# Patient Record
Sex: FEMALE | Race: WHITE
[De-identification: names, ages, dates, MRNs, and addresses within clinical notes are randomized per-mention and may not be internally consistent; named-entity substitution may affect disease eponyms.]

---

## 2018-07-13 ENCOUNTER — HOSPITAL ENCOUNTER (OUTPATIENT)
Dept: HOSPITAL 76 - LAB | Age: 33
Discharge: HOME | End: 2018-07-13
Attending: ADVANCED PRACTICE MIDWIFE
Payer: COMMERCIAL

## 2018-07-13 DIAGNOSIS — E28.2: ICD-10-CM

## 2018-07-13 DIAGNOSIS — Z01.419: Primary | ICD-10-CM

## 2018-07-13 LAB
CHOLEST SERPL-MCNC: 238 MG/DL
EST. AVERAGE GLUCOSE BLD GHB EST-MCNC: 123 MG/DL (ref 70–100)
FSH SERPL-ACNC: 3.85 MIU/ML
HB2 TOTAL: 17.8 G/DL
HBA1C BLD-MCNC: 0.72 G/DL
HDLC SERPL-MCNC: 30 MG/DL
HDLC SERPL: 7.9 {RATIO} (ref ?–4.4)
HEMOGLOBIN A1C %: 5.9 % (ref 4.6–6.2)
LDLC SERPL CALC-MCNC: 135 MG/DL
LDLC/HDLC SERPL: 4.5 {RATIO} (ref ?–4.4)
LH SERPL-ACNC: 7.2 MIU/ML
TSH SERPL-ACNC: 2.22 UIU/ML (ref 0.34–5.6)
VLDLC SERPL-SCNC: 73 MG/DL

## 2018-07-13 PROCEDURE — 81599 UNLISTED MAAA: CPT

## 2018-07-13 PROCEDURE — 83721 ASSAY OF BLOOD LIPOPROTEIN: CPT

## 2018-07-13 PROCEDURE — 36415 COLL VENOUS BLD VENIPUNCTURE: CPT

## 2018-07-13 PROCEDURE — 80061 LIPID PANEL: CPT

## 2018-07-13 PROCEDURE — 83002 ASSAY OF GONADOTROPIN (LH): CPT

## 2018-07-13 PROCEDURE — 82670 ASSAY OF TOTAL ESTRADIOL: CPT

## 2018-07-13 PROCEDURE — 82626 DEHYDROEPIANDROSTERONE: CPT

## 2018-07-13 PROCEDURE — 83036 HEMOGLOBIN GLYCOSYLATED A1C: CPT

## 2018-07-13 PROCEDURE — 84443 ASSAY THYROID STIM HORMONE: CPT

## 2018-07-13 PROCEDURE — 82951 GLUCOSE TOLERANCE TEST (GTT): CPT

## 2018-07-13 PROCEDURE — 83001 ASSAY OF GONADOTROPIN (FSH): CPT

## 2018-07-14 LAB — ESTRADIOL SERPL-MCNC: 82 PG/ML

## 2018-09-21 ENCOUNTER — HOSPITAL ENCOUNTER (OUTPATIENT)
Dept: HOSPITAL 76 - LAB | Age: 33
Discharge: HOME | End: 2018-09-21
Attending: NURSE PRACTITIONER
Payer: COMMERCIAL

## 2018-09-21 ENCOUNTER — HOSPITAL ENCOUNTER (OUTPATIENT)
Dept: HOSPITAL 76 - NS | Age: 33
Discharge: HOME | End: 2018-09-21
Attending: ADVANCED PRACTICE MIDWIFE
Payer: COMMERCIAL

## 2018-09-21 DIAGNOSIS — Z71.3: Primary | ICD-10-CM

## 2018-09-21 DIAGNOSIS — E08.8: ICD-10-CM

## 2018-09-21 DIAGNOSIS — E25.9: Primary | ICD-10-CM

## 2018-09-21 PROCEDURE — 84403 ASSAY OF TOTAL TESTOSTERONE: CPT

## 2018-09-21 PROCEDURE — 82533 TOTAL CORTISOL: CPT

## 2018-09-21 PROCEDURE — 82627 DEHYDROEPIANDROSTERONE: CPT

## 2018-09-21 PROCEDURE — 84146 ASSAY OF PROLACTIN: CPT

## 2018-09-21 PROCEDURE — 36415 COLL VENOUS BLD VENIPUNCTURE: CPT

## 2018-09-21 PROCEDURE — 97802 MEDICAL NUTRITION INDIV IN: CPT

## 2018-10-05 ENCOUNTER — HOSPITAL ENCOUNTER (OUTPATIENT)
Dept: HOSPITAL 76 - NS | Age: 33
Discharge: HOME | End: 2018-10-05
Attending: ADVANCED PRACTICE MIDWIFE
Payer: COMMERCIAL

## 2018-10-05 DIAGNOSIS — Z71.3: Primary | ICD-10-CM

## 2018-10-05 DIAGNOSIS — E08.8: ICD-10-CM

## 2018-10-05 PROCEDURE — 97803 MED NUTRITION INDIV SUBSEQ: CPT

## 2018-11-18 ENCOUNTER — HOSPITAL ENCOUNTER (OUTPATIENT)
Dept: HOSPITAL 76 - DI | Age: 33
Discharge: HOME | End: 2018-11-18
Attending: FAMILY MEDICINE
Payer: COMMERCIAL

## 2018-11-18 DIAGNOSIS — R00.2: Primary | ICD-10-CM

## 2018-11-18 DIAGNOSIS — I49.8: ICD-10-CM

## 2018-11-18 PROCEDURE — 93306 TTE W/DOPPLER COMPLETE: CPT

## 2018-12-03 ENCOUNTER — HOSPITAL ENCOUNTER (EMERGENCY)
Dept: HOSPITAL 76 - ED | Age: 33
Discharge: HOME | End: 2018-12-03
Payer: COMMERCIAL

## 2018-12-03 VITALS — SYSTOLIC BLOOD PRESSURE: 128 MMHG | DIASTOLIC BLOOD PRESSURE: 83 MMHG

## 2018-12-03 DIAGNOSIS — L29.9: ICD-10-CM

## 2018-12-03 DIAGNOSIS — Z87.891: ICD-10-CM

## 2018-12-03 DIAGNOSIS — T36.1X5A: ICD-10-CM

## 2018-12-03 DIAGNOSIS — R20.8: ICD-10-CM

## 2018-12-03 DIAGNOSIS — R21: Primary | ICD-10-CM

## 2018-12-03 PROCEDURE — 99283 EMERGENCY DEPT VISIT LOW MDM: CPT

## 2018-12-03 NOTE — ED PHYSICIAN DOCUMENTATION
PD HPI SKIN





- Stated complaint


Stated Complaint: ALLERGIC REACTION





- Chief complaint


Chief Complaint: Allergic Rx





- History obtained from


History obtained from: Patient





- History of Present Illness


Timing - onset: Yesterday


Timing - details: Gradual onset, Waxing and waning (has now noted the pattern of

rash/itching about an hour or less after taking recent abx cephalexin)


Location: [facility]


Quality / character: Itchy, Burning


Improved by: Benadryl


Associated symptoms: No: Fever, Myalgias, Joint pain


Contributing factors: Exposed to medication





Review of Systems


Constitutional: reports: Chills, Myalgias


Eyes: reports: Decreased vision


Ears: denies: Drainage/discharge


Nose: denies: Foreign Body


Throat: denies: Dental pain / toothache





PD PAST MEDICAL HISTORY





- Past Medical History


Past Medical History: No


Cardiovascular: None


Respiratory: None


Neuro: None


Endocrine/Autoimmune: HyPOthyroidism, Other


GI: None


GYN: None


: None


HEENT: None


Psych: None


Musculoskeletal: Chronic back pain


Derm: Rosacea


Other Past Medical History: late onset CAH





- Past Surgical History


Past Surgical History: No





- Present Medications


Home Medications: 


                                Ambulatory Orders











 Medication  Instructions  Recorded  Confirmed


 


Bcp 1 tab PO DAILY 11/19/13 11/19/13


 


Cetirizine [ZyrTEC] 10 mg PO DAILY 11/19/13 11/19/13


 


Fenofibrate [Fenoglide] 120 mg PO DAILY 11/19/13 11/19/13


 


Levothyroxine Sodium [Tirosint] 25 mcg PO DAILY 11/19/13 11/19/13


 


Metformin HCl [Metformin HCl ER] 1,000 mg PO DAILY 11/19/13 11/19/13


 


Spiranolactone 100 tab PO DAILY 11/19/13 11/19/13


 


EPINEPHrine [Epipen] 0.3 mg IM ONCE PRN #2 syringe 03/15/14 


 


Famotidine [Pepcid] 20 mg PO BID #10 tablet 03/15/14 


 


diphenhydrAMINE [Benadryl] 25 - 50 mg PO Q4-6H PRN #30 capsule 03/15/14 


 


predniSONE [Deltasone] 40 mg PO DAILY 5 Days  tablet 03/15/14 


 


Dexamethasone [Decadron] 4 mg PO DAILY #5 tablet 12/03/18 














- Allergies


Allergies/Adverse Reactions: 


                                    Allergies











Allergy/AdvReac Type Severity Reaction Status Date / Time


 


bromopex AdvReac Severe problems Uncoded 11/23/18 13:30





   w/ airway  














- Social History


Does the pt smoke?: No


Smoking Status: Former smoker


Does the pt drink ETOH?: Yes


Does the pt have substance abuse?: No





- Immunizations


Immunizations are current?: Yes





- POLST


Patient has POLST: No





PD ED PE NORMAL





- Vitals


Vital signs reviewed: No





- General


General: Alert and oriented X 3, No acute distress, Well developed/nourished





- HEENT


HEENT: Moist mucous membranes





- Neck


Neck: Supple, no meningeal sign, No adenopathy





- Cardiac


Cardiac: RRR





- Abdomen


Abdomen: Soft, Non tender





- Back


Back: No CVA TTP





- Derm


Derm: Normal color, Warm and dry





Results





- Vitals


Vitals: 


                                     Oxygen











O2 Source                      Room air

















PD MEDICAL DECISION MAKING





- ED course


Complexity details: considered differential, d/w patient





Departure





- Departure


Disposition: 01 Home, Self Care


Clinical Impression: 


Allergic reaction caused by a drug


Qualifiers:


 Encounter type: initial encounter Qualified Code(s): T78.40XA - Allergy, 

unspecified, initial encounter





Condition: Stable


Record reviewed to determine appropriate education?: Yes


Instructions:  ED Drug React Allergic


Follow-Up: 


JOSE ENRIQUE TRAN [Primary Care Provider] - 


Prescriptions: 


Dexamethasone [Decadron] 4 mg PO DAILY #5 tablet


Comments: 


Discontinue the cephalexin.  Use cetirizine or other long-acting antihistamine 

daily for the next several days.  We gave you a dose of steroid here in the 

department and that may be sufficient to help with the allergic reaction.  If 

you notice some lingering itchiness or rash over the next couple of days, then 

continue these Decadron steroid for 3-5 days.  Return if worsening symptoms.  

Follow-up with your dermatologist regarding other alternative treatments for the

 rosacea.


Discharge Date/Time: 12/03/18 14:43

## 2019-02-12 ENCOUNTER — HOSPITAL ENCOUNTER (EMERGENCY)
Dept: HOSPITAL 76 - ED | Age: 34
Discharge: HOME | End: 2019-02-12
Payer: COMMERCIAL

## 2019-02-12 ENCOUNTER — HOSPITAL ENCOUNTER (OUTPATIENT)
Dept: HOSPITAL 76 - EMS | Age: 34
Discharge: ANOTHER HOSPITAL | End: 2019-02-12
Attending: SURGERY
Payer: COMMERCIAL

## 2019-02-12 VITALS — DIASTOLIC BLOOD PRESSURE: 62 MMHG | SYSTOLIC BLOOD PRESSURE: 113 MMHG

## 2019-02-12 VITALS — DIASTOLIC BLOOD PRESSURE: 85 MMHG | SYSTOLIC BLOOD PRESSURE: 135 MMHG

## 2019-02-12 DIAGNOSIS — L02.215: Primary | ICD-10-CM

## 2019-02-12 DIAGNOSIS — R68.83: Primary | ICD-10-CM

## 2019-02-12 DIAGNOSIS — R50.81: ICD-10-CM

## 2019-02-12 LAB
ALBUMIN DIAFP-MCNC: 4.2 G/DL (ref 3.2–5.5)
ALBUMIN/GLOB SERPL: 1.1 {RATIO} (ref 1–2.2)
ALP SERPL-CCNC: 120 IU/L (ref 42–121)
ALT SERPL W P-5'-P-CCNC: 95 IU/L (ref 10–60)
ANION GAP SERPL CALCULATED.4IONS-SCNC: 11 MMOL/L (ref 6–13)
AST SERPL W P-5'-P-CCNC: 59 IU/L (ref 10–42)
BASOPHILS NFR BLD AUTO: 0.1 10^3/UL (ref 0–0.1)
BASOPHILS NFR BLD AUTO: 0.4 %
BILIRUB BLD-MCNC: 0.7 MG/DL (ref 0.2–1)
BUN SERPL-MCNC: 11 MG/DL (ref 6–20)
CALCIUM UR-MCNC: 9.3 MG/DL (ref 8.5–10.3)
CHLORIDE SERPL-SCNC: 102 MMOL/L (ref 101–111)
CO2 SERPL-SCNC: 23 MMOL/L (ref 21–32)
CREAT SERPLBLD-SCNC: 0.6 MG/DL (ref 0.4–1)
EOSINOPHIL # BLD AUTO: 0.1 10^3/UL (ref 0–0.7)
EOSINOPHIL NFR BLD AUTO: 0.5 %
ERYTHROCYTE [DISTWIDTH] IN BLOOD BY AUTOMATED COUNT: 12.8 % (ref 12–15)
GFRSERPLBLD MDRD-ARVRAT: 114 ML/MIN/{1.73_M2} (ref 89–?)
GLOBULIN SER-MCNC: 3.9 G/DL (ref 2.1–4.2)
GLUCOSE SERPL-MCNC: 106 MG/DL (ref 70–100)
HGB UR QL STRIP: 15.6 G/DL (ref 12–16)
LIPASE SERPL-CCNC: 29 U/L (ref 22–51)
LYMPHOCYTES # SPEC AUTO: 0.9 10^3/UL (ref 1.5–3.5)
LYMPHOCYTES NFR BLD AUTO: 6.7 %
MCH RBC QN AUTO: 30.7 PG (ref 27–31)
MCHC RBC AUTO-ENTMCNC: 33.6 G/DL (ref 32–36)
MCV RBC AUTO: 91.2 FL (ref 81–99)
MONOCYTES # BLD AUTO: 0.7 10^3/UL (ref 0–1)
MONOCYTES NFR BLD AUTO: 5.8 %
NEUTROPHILS # BLD AUTO: 11.1 10^3/UL (ref 1.5–6.6)
NEUTROPHILS # SNV AUTO: 12.8 X10^3/UL (ref 4.8–10.8)
NEUTROPHILS NFR BLD AUTO: 86.6 %
PDW BLD AUTO: 7.9 FL (ref 7.9–10.8)
PLATELET # BLD: 215 10^3/UL (ref 130–450)
PROT SPEC-MCNC: 8.1 G/DL (ref 6.7–8.2)
RBC MAR: 5.07 10^6/UL (ref 4.2–5.4)
SODIUM SERPLBLD-SCNC: 136 MMOL/L (ref 135–145)

## 2019-02-12 PROCEDURE — 83690 ASSAY OF LIPASE: CPT

## 2019-02-12 PROCEDURE — 87276 INFLUENZA A AG IF: CPT

## 2019-02-12 PROCEDURE — 99283 EMERGENCY DEPT VISIT LOW MDM: CPT

## 2019-02-12 PROCEDURE — 85025 COMPLETE CBC W/AUTO DIFF WBC: CPT

## 2019-02-12 PROCEDURE — 56405 I&D VULVA/PERINEAL ABSCESS: CPT

## 2019-02-12 PROCEDURE — 96365 THER/PROPH/DIAG IV INF INIT: CPT

## 2019-02-12 PROCEDURE — 87205 SMEAR GRAM STAIN: CPT

## 2019-02-12 PROCEDURE — 36415 COLL VENOUS BLD VENIPUNCTURE: CPT

## 2019-02-12 PROCEDURE — 87070 CULTURE OTHR SPECIMN AEROBIC: CPT

## 2019-02-12 PROCEDURE — 87275 INFLUENZA B AG IF: CPT

## 2019-02-12 PROCEDURE — 87040 BLOOD CULTURE FOR BACTERIA: CPT

## 2019-02-12 PROCEDURE — 80053 COMPREHEN METABOLIC PANEL: CPT

## 2019-02-12 PROCEDURE — 83605 ASSAY OF LACTIC ACID: CPT

## 2019-02-12 NOTE — ED PHYSICIAN DOCUMENTATION
History of Present Illness





- Stated complaint


Stated Complaint: FEMALE 





- Chief complaint


Chief Complaint: General





- History obtained from


History obtained from: Patient





- History of Present Illness


Timing: Other (3-day history of a painful labial abscess.  She has history of 

abscesses in other spots but never there.  No history of MRSA.  No fevers.  No 

possibility of pregnancy.)





Review of Systems


Constitutional: denies: Fever, Chills


Respiratory: denies: Dyspnea, Cough


GI: denies: Abdominal Pain, Nausea, Vomiting





PD PAST MEDICAL HISTORY





- Past Medical History


Past Medical History: Yes


Cardiovascular: None


Respiratory: None


Neuro: None


Endocrine/Autoimmune: Other


GI: None


GYN: None


: None


HEENT: None


Psych: None


Musculoskeletal: None


Derm: Rosacea


Other Past Medical History: congential adrenal hyperplasia





- Past Surgical History


Past Surgical History: No





- Present Medications


Home Medications: 


                                Ambulatory Orders











 Medication  Instructions  Recorded  Confirmed


 


Clindamycin HCl [Clindamycin 300MG 300 mg PO Q6H #28 capsule 02/12/19 





CAP]   














- Allergies


Allergies/Adverse Reactions: 


                                    Allergies











Allergy/AdvReac Type Severity Reaction Status Date / Time


 


cephalexin Allergy  Hives Verified 02/12/19 12:48


 


bromopex AdvReac Severe problems Uncoded 02/12/19 13:35





   w/ airway  














- Social History


Does the pt smoke?: No


Smoking Status: Never smoker


Does the pt drink ETOH?: Yes


Does the pt have substance abuse?: No





- Immunizations


Immunizations are current?: Yes





- POLST


Patient has POLST: No





PD ED PE NORMAL





- Vitals


Vital signs reviewed: Yes





- General


General: Alert and oriented X 3, No acute distress





- Derm


Derm: Other (Both exam and procedure were done with Charlotte LARA RN at the bedside; 

She has a abscess is actually not on the labia its on the very inferior gluteal 

fold on the right.)





- Neuro


Neuro: Alert and oriented X 3, Normal speech





- Psych


Psych: Normal mood, Normal affect





Results





- Vitals


Vitals: 


                               Vital Signs - 24 hr











  02/12/19





  12:45


 


Temperature 36.3 C L


 


Heart Rate 95


 


Respiratory 18





Rate 


 


Blood Pressure 135/85 H


 


O2 Saturation 99








                                     Oxygen











O2 Source                      Room air

















Procedures





- Abscess I&D (location)


  ** Perineum


Preparation: Lidocaine 1%


Incision: Incised with scalpel, Purulent drainage, Loculations broken, Packed 

(with 1/4"packing), Culture obtained


Other: Pt tolerated well, Dressing applied, Antibiotic prescribed





Departure





- Departure


Disposition: 01 Home, Self Care


Clinical Impression: 


 Abscess





Condition: Good


Record reviewed to determine appropriate education?: Yes


Instructions:  ED Abscess IandD


Prescriptions: 


Clindamycin HCl [Clindamycin 300MG CAP] 300 mg PO Q6H #28 capsule


Comments: 


We are performing a wound culture, the results should be done in 48-72 hours.  

If antibiotic change is necessary we will call you.  Return if worse in the 

meantime, especially if you develop increased pain, fevers, cannot keep down the

medication.  Otherwise follow-up with your physician in approximately 2-3 days.

## 2019-02-12 NOTE — ED PHYSICIAN DOCUMENTATION
PD HPI FEVER





- Stated complaint


Stated Complaint: ABCESS





- History obtained from


History obtained from: Patient





- History of Present Illness


Timing - onset: Other (I saw her earlier in the day for a perineal abscess that 

was incised and drained.  She went home and the pain is bearable but she started

to get severe chills and had a low-grade fever and felt weak.)





Review of Systems


Ten Systems: 10 systems reviewed and negative


Constitutional: reports: Fever, Chills, Fatigue


Nose: reports: Rhinorrhea / runny nose


Throat: denies: Sore throat


GI: denies: Abdominal Pain, Nausea, Vomiting





PD PAST MEDICAL HISTORY





- Past Medical History


Cardiovascular: None


Respiratory: None


Neuro: None


Endocrine/Autoimmune: Other


GI: None


GYN: None


: None


HEENT: None


Psych: None


Musculoskeletal: None


Derm: Rosacea





- Past Surgical History


Past Surgical History: No





- Present Medications


Home Medications: 


                                Ambulatory Orders











 Medication  Instructions  Recorded  Confirmed


 


Clindamycin HCl [Clindamycin 300MG 300 mg PO Q6H #28 capsule 02/12/19 





CAP]   














- Allergies


Allergies/Adverse Reactions: 


                                    Allergies











Allergy/AdvReac Type Severity Reaction Status Date / Time


 


cephalexin Allergy  Hives Verified 02/12/19 17:31


 


bromopex AdvReac Severe problems Uncoded 02/12/19 17:31





   w/ airway  














- Social History


Does the pt smoke?: No


Smoking Status: Never smoker


Does the pt drink ETOH?: Yes


Does the pt have substance abuse?: No





- Immunizations


Immunizations are current?: Yes





- POLST


Patient has POLST: No





PD ED PE NORMAL





- Vitals


Vital signs reviewed: Yes





- General


General: Alert and oriented X 3, No acute distress





- HEENT


HEENT: PERRL, EOMI





- Neck


Neck: Supple, no meningeal sign, No bony TTP





- Cardiac


Cardiac: No murmur, Other (tachy)





- Respiratory


Respiratory: No respiratory distress, Clear bilaterally





- Abdomen


Abdomen: Non tender





- Neuro


Neuro: Alert and oriented X 3, Normal speech





- Psych


Psych: Normal mood, Normal affect





Results





- Vitals


Vitals: 


                               Vital Signs - 24 hr











  02/12/19





  17:28


 


Temperature 36.9 C


 


Heart Rate 118 H


 


Respiratory 18





Rate 


 


Blood Pressure 146/76 H


 


O2 Saturation 99








                                     Oxygen











O2 Source                      Room air

















- Labs


Labs: 


                                Laboratory Tests











  02/12/19 02/12/19 02/12/19





  17:40 17:40 17:40


 


WBC  12.8 H  


 


RBC  5.07  


 


Hgb  15.6  


 


Hct  46.3  


 


MCV  91.2  


 


MCH  30.7  


 


MCHC  33.6  


 


RDW  12.8  


 


Plt Count  215  


 


MPV  7.9  


 


Neut # (Auto)  11.1 H  


 


Lymph # (Auto)  0.9 L  


 


Mono # (Auto)  0.7  


 


Eos # (Auto)  0.1  


 


Baso # (Auto)  0.1  


 


Absolute Nucleated RBC  0.01  


 


Nucleated RBC %  0.1  


 


Sodium   136 


 


Potassium   3.8 


 


Chloride   102 


 


Carbon Dioxide   23 


 


Anion Gap   11.0 


 


BUN   11 


 


Creatinine   0.6 


 


Estimated GFR (MDRD)   114 


 


Glucose   106 H 


 


Lactic Acid    2.0


 


Calcium   9.3 


 


Total Bilirubin   0.7 


 


AST   59 H 


 


ALT   95 H 


 


Alkaline Phosphatase   120 


 


Total Protein   8.1 


 


Albumin   4.2 


 


Globulin   3.9 


 


Albumin/Globulin Ratio   1.1 


 


Lipase   29 


 


Influenza A (Rapid)   


 


Influenza B (Rapid)   














  02/12/19





  17:45


 


WBC 


 


RBC 


 


Hgb 


 


Hct 


 


MCV 


 


MCH 


 


MCHC 


 


RDW 


 


Plt Count 


 


MPV 


 


Neut # (Auto) 


 


Lymph # (Auto) 


 


Mono # (Auto) 


 


Eos # (Auto) 


 


Baso # (Auto) 


 


Absolute Nucleated RBC 


 


Nucleated RBC % 


 


Sodium 


 


Potassium 


 


Chloride 


 


Carbon Dioxide 


 


Anion Gap 


 


BUN 


 


Creatinine 


 


Estimated GFR (MDRD) 


 


Glucose 


 


Lactic Acid 


 


Calcium 


 


Total Bilirubin 


 


AST 


 


ALT 


 


Alkaline Phosphatase 


 


Total Protein 


 


Albumin 


 


Globulin 


 


Albumin/Globulin Ratio 


 


Lipase 


 


Influenza A (Rapid)  Negative


 


Influenza B (Rapid)  Negative














PD MEDICAL DECISION MAKING





- ED course


ED course: 





33-year-old woman presents after an incision and drainage of an abscess earlier 

in the day with chills, but her labs are reassuring from a sepsis standpoint she

received IV fluids and clindamycin here and was feeling better.





Departure





- Departure


Disposition: 01 Home, Self Care


Clinical Impression: 


 Abscess





Fever


Qualifiers:


 Fever type: due to other condition Qualified Code(s): R50.81 - Fever presenting

with conditions classified elsewhere





Condition: Good


Record reviewed to determine appropriate education?: Yes


Comments: 


Use the discharge instructions from earlier in the day, return if worse or if 

new symptoms develop.  Still follow-up here with your doctor on Thursday for 

recheck.  Fill the antibiotic prescription tomorrow morning.

## 2019-03-07 ENCOUNTER — HOSPITAL ENCOUNTER (OUTPATIENT)
Dept: HOSPITAL 76 - LAB | Age: 34
Discharge: HOME | End: 2019-03-07
Attending: INTERNAL MEDICINE
Payer: COMMERCIAL

## 2019-03-07 DIAGNOSIS — R00.2: Primary | ICD-10-CM

## 2019-03-07 LAB
T4 FREE SERPL-MCNC: 0.68 NG/DL (ref 0.58–1.64)
TSH SERPL-ACNC: 2.66 UIU/ML (ref 0.34–5.6)

## 2019-03-07 PROCEDURE — 36415 COLL VENOUS BLD VENIPUNCTURE: CPT

## 2019-03-07 PROCEDURE — 84439 ASSAY OF FREE THYROXINE: CPT

## 2019-03-07 PROCEDURE — 83735 ASSAY OF MAGNESIUM: CPT

## 2019-03-07 PROCEDURE — 84443 ASSAY THYROID STIM HORMONE: CPT

## 2019-11-16 ENCOUNTER — HOSPITAL ENCOUNTER (EMERGENCY)
Dept: HOSPITAL 76 - ED | Age: 34
LOS: 1 days | Discharge: HOME | End: 2019-11-17
Payer: COMMERCIAL

## 2019-11-16 DIAGNOSIS — N83.202: ICD-10-CM

## 2019-11-16 DIAGNOSIS — M54.5: ICD-10-CM

## 2019-11-16 DIAGNOSIS — R10.9: Primary | ICD-10-CM

## 2019-11-16 DIAGNOSIS — R11.0: ICD-10-CM

## 2019-11-16 DIAGNOSIS — N83.201: ICD-10-CM

## 2019-11-16 LAB
ALBUMIN DIAFP-MCNC: 4.2 G/DL (ref 3.2–5.5)
ALBUMIN/GLOB SERPL: 1.2 {RATIO} (ref 1–2.2)
ALP SERPL-CCNC: 68 IU/L (ref 42–121)
ALT SERPL W P-5'-P-CCNC: 41 IU/L (ref 10–60)
ANION GAP SERPL CALCULATED.4IONS-SCNC: 9 MMOL/L (ref 6–13)
AST SERPL W P-5'-P-CCNC: 24 IU/L (ref 10–42)
BASOPHILS NFR BLD AUTO: 0.1 10^3/UL (ref 0–0.1)
BASOPHILS NFR BLD AUTO: 0.5 %
BILIRUB BLD-MCNC: 0.4 MG/DL (ref 0.2–1)
BUN SERPL-MCNC: 16 MG/DL (ref 6–20)
CALCIUM UR-MCNC: 9.4 MG/DL (ref 8.5–10.3)
CHLORIDE SERPL-SCNC: 103 MMOL/L (ref 101–111)
CLARITY UR REFRACT.AUTO: CLEAR
CO2 SERPL-SCNC: 25 MMOL/L (ref 21–32)
CREAT SERPLBLD-SCNC: 0.6 MG/DL (ref 0.4–1)
EOSINOPHIL # BLD AUTO: 0.1 10^3/UL (ref 0–0.7)
EOSINOPHIL NFR BLD AUTO: 1.5 %
ERYTHROCYTE [DISTWIDTH] IN BLOOD BY AUTOMATED COUNT: 12.2 % (ref 12–15)
GFRSERPLBLD MDRD-ARVRAT: 113 ML/MIN/{1.73_M2} (ref 89–?)
GLOBULIN SER-MCNC: 3.6 G/DL (ref 2.1–4.2)
GLUCOSE SERPL-MCNC: 122 MG/DL (ref 70–100)
GLUCOSE UR QL STRIP.AUTO: NEGATIVE MG/DL
HCG UR QL: NEGATIVE
HGB UR QL STRIP: 15.1 G/DL (ref 12–16)
KETONES UR QL STRIP.AUTO: NEGATIVE MG/DL
LIPASE SERPL-CCNC: 33 U/L (ref 22–51)
LYMPHOCYTES # SPEC AUTO: 2.1 10^3/UL (ref 1.5–3.5)
LYMPHOCYTES NFR BLD AUTO: 22.4 %
MCH RBC QN AUTO: 30.4 PG (ref 27–31)
MCHC RBC AUTO-ENTMCNC: 32.8 G/DL (ref 32–36)
MCV RBC AUTO: 92.7 FL (ref 81–99)
MONOCYTES # BLD AUTO: 0.7 10^3/UL (ref 0–1)
MONOCYTES NFR BLD AUTO: 7.6 %
NEUTROPHILS # BLD AUTO: 6.3 10^3/UL (ref 1.5–6.6)
NEUTROPHILS # SNV AUTO: 9.3 X10^3/UL (ref 4.8–10.8)
NEUTROPHILS NFR BLD AUTO: 67.8 %
NITRITE UR QL STRIP.AUTO: NEGATIVE
PDW BLD AUTO: 10.1 FL (ref 7.9–10.8)
PH UR STRIP.AUTO: 6.5 PH (ref 5–7.5)
PLATELET # BLD: 221 10^3/UL (ref 130–450)
PROT SPEC-MCNC: 7.8 G/DL (ref 6.7–8.2)
PROT UR STRIP.AUTO-MCNC: NEGATIVE MG/DL
RBC # UR STRIP.AUTO: NEGATIVE /UL
RBC MAR: 4.96 10^6/UL (ref 4.2–5.4)
SODIUM SERPLBLD-SCNC: 137 MMOL/L (ref 135–145)
SP GR UR STRIP.AUTO: <=1.005 (ref 1–1.03)
SP GR UR STRIP.AUTO: <=1.005 (ref 1–1.03)
UROBILINOGEN UR QL STRIP.AUTO: (no result) E.U./DL
UROBILINOGEN UR STRIP.AUTO-MCNC: NEGATIVE MG/DL

## 2019-11-16 PROCEDURE — 74176 CT ABD & PELVIS W/O CONTRAST: CPT

## 2019-11-16 PROCEDURE — 81025 URINE PREGNANCY TEST: CPT

## 2019-11-16 PROCEDURE — 85025 COMPLETE CBC W/AUTO DIFF WBC: CPT

## 2019-11-16 PROCEDURE — 36415 COLL VENOUS BLD VENIPUNCTURE: CPT

## 2019-11-16 PROCEDURE — 93976 VASCULAR STUDY: CPT

## 2019-11-16 PROCEDURE — 76856 US EXAM PELVIC COMPLETE: CPT

## 2019-11-16 PROCEDURE — 81001 URINALYSIS AUTO W/SCOPE: CPT

## 2019-11-16 PROCEDURE — 80053 COMPREHEN METABOLIC PANEL: CPT

## 2019-11-16 PROCEDURE — 87086 URINE CULTURE/COLONY COUNT: CPT

## 2019-11-16 PROCEDURE — 81003 URINALYSIS AUTO W/O SCOPE: CPT

## 2019-11-16 PROCEDURE — 99284 EMERGENCY DEPT VISIT MOD MDM: CPT

## 2019-11-16 PROCEDURE — 83690 ASSAY OF LIPASE: CPT

## 2019-11-16 NOTE — ED PHYSICIAN DOCUMENTATION
PD HPI BACK PAIN





- Stated complaint


Stated Complaint: LOWER LT BACK PAIN





- Chief complaint


Chief Complaint: Back Pain





- History obtained from


History obtained from: Patient





- History of Present Illness


Timing - onset: How many hours ago (couple), Today


Timing - duration: Hours (couple)


Timing - details: Abrupt onset, Still present, Waxing and waning.  No: 

Intermittant


Location: Mid, Left (flank area left side. Onset pain without injury and had it 

continue without impact by movement, drinking fluids, palpation, nor breathing.)


Quality: Pain, Spasm, Aching


Associated symptoms: No: Fever, Weakness, Numbness, Hematuria


Improves with: Nothing.  No: Rest


Worsened by: No: Movement, Twisting, Palpation


Contributing factors: No: Lifting, Twisting, Trauma


Similar symptoms before: Has not had sx before (no prior kidney stones nor back 

pains.)


Recently seen: Clinic (seen by PMD and Derm for Rosacea and is on Clindamycin, 

day 7 out of 10. Has had some nausea at times and feeling of upset stomach. Had 

not had abd pain per se.)





Review of Systems


Constitutional: denies: Fever, Chills


Nose: denies: Rhinorrhea / runny nose, Congestion


Throat: denies: Sore throat


Cardiac: denies: Chest pain / pressure


Respiratory: denies: Cough


GI: reports: Nausea.  denies: Abdominal Pain, Vomiting, Constipation, Diarrhea


: denies: Dysuria, Frequency, Hematuria


Skin: denies: Rash, Lesions


Musculoskeletal: denies: Neck pain, Back pain, Extremity swelling


Neurologic: denies: Generalized weakness





PD PAST MEDICAL HISTORY





- Past Medical History


Past Medical History: Yes


Cardiovascular: None


Respiratory: None


Neuro: None


Endocrine/Autoimmune: Other


GI: None


GYN: None


: None


HEENT: None


Psych: None


Musculoskeletal: None


Derm: Rosacea





- Past Surgical History


Past Surgical History: No





- Present Medications


Home Medications: 


                                Ambulatory Orders











 Medication  Instructions  Recorded  Confirmed


 


Clindamycin HCl [Clindamycin 300MG 300 mg PO Q6H #28 capsule 02/12/19 





CAP]   


 


Famotidine 20 mg PO DAILY #30 tablet 11/17/19 


 


Hydrocodone/Acetaminophen [Norco 1 each PO Q6H PRN #15 tablet 11/17/19 





5-325 Tablet]   


 


Lidocaine Viscous 2% [Xylocaine 5 ml PO Q4H PRN #100 ml 11/17/19 





Viscous 2%]   














- Allergies


Allergies/Adverse Reactions: 


                                    Allergies











Allergy/AdvReac Type Severity Reaction Status Date / Time


 


cephalexin Allergy  Hives Verified 11/16/19 19:43


 


fluconazole Allergy  Respiratory Verified 11/16/19 19:44


 


sulfamethoxazole Allergy  Respiratory Verified 11/16/19 19:44





[From Bactrim]     


 


trimethoprim [From Bactrim] Allergy  Respiratory Verified 11/16/19 19:44


 


bromopex AdvReac Severe problems Uncoded 11/16/19 19:43





   w/ airway  














- Social History


Does the pt smoke?: No


Smoking Status: Never smoker


Does the pt drink ETOH?: Yes


Does the pt have substance abuse?: No





- Immunizations


Immunizations are current?: Yes





- POLST


Patient has POLST: No





PD ED PE NORMAL





- Vitals


Vital signs reviewed: Yes





- General


General: Alert and oriented X 3, Well developed/nourished, Other (She seems 

uncomfortable due to the pain.  There is no change in it with range of motion.  

There are some CVA tenderness on the left.)





- Neck


Neck: Supple, no meningeal sign, No adenopathy





- Cardiac


Cardiac: RRR, No murmur





- Respiratory


Respiratory: Clear bilaterally





- Abdomen


Abdomen: Normal bowel sounds, Soft, Non distended, No organomegaly





- Female 


Female : Deferred





- Rectal


Rectal: Deferred





- Back


Back: No spinal TTP, Other (some left CVA tenderness)





- Derm


Derm: Normal color, Warm and dry, No rash





- Extremities


Extremities: Normal ROM s pain, No edema, No calf tenderness / cord





- Neuro


Neuro: Alert and oriented X 3





Results





- Vitals


Vitals: 


                               Vital Signs - 24 hr











  11/16/19 11/16/19 11/17/19





  19:40 23:02 01:29


 


Temperature 36.5 C 37.0 C 36.8 C


 


Heart Rate 96 84 78


 


Respiratory 18 18 18





Rate   


 


Blood Pressure 139/58 H 126/77 135/74 H


 


O2 Saturation 100 97 99








                                     Oxygen











O2 Source                      Room air

















- Labs


Labs: 


                                Laboratory Tests











  11/16/19 11/16/19 11/16/19





  19:46 19:46 21:20


 


WBC    9.3


 


RBC    4.96


 


Hgb    15.1


 


Hct    46.0


 


MCV    92.7


 


MCH    30.4


 


MCHC    32.8


 


RDW    12.2


 


Plt Count    221


 


MPV    10.1


 


Neut # (Auto)    6.3


 


Lymph # (Auto)    2.1


 


Mono # (Auto)    0.7


 


Eos # (Auto)    0.1


 


Baso # (Auto)    0.1


 


Absolute Nucleated RBC    0.00


 


Nucleated RBC %    0.0


 


Sodium   


 


Potassium   


 


Chloride   


 


Carbon Dioxide   


 


Anion Gap   


 


BUN   


 


Creatinine   


 


Estimated GFR (MDRD)   


 


Glucose   


 


Calcium   


 


Total Bilirubin   


 


AST   


 


ALT   


 


Alkaline Phosphatase   


 


Total Protein   


 


Albumin   


 


Globulin   


 


Albumin/Globulin Ratio   


 


Lipase   


 


Urine Color  YELLOW  


 


Urine Clarity  CLEAR  


 


Urine pH  6.5  


 


Ur Specific Gravity  <=1.005  <=1.005 


 


Urine Protein  NEGATIVE  


 


Urine Glucose (UA)  NEGATIVE  


 


Urine Ketones  NEGATIVE  


 


Urine Occult Blood  NEGATIVE  


 


Urine Nitrite  NEGATIVE  


 


Urine Bilirubin  NEGATIVE  


 


Urine Urobilinogen  0.2 (NORMAL)  


 


Ur Leukocyte Esterase  NEGATIVE  


 


Ur Microscopic Review  NOT INDICATED  


 


Urine Culture Comments  NOT INDICATED  


 


Urine HCG, Qual   NEGATIVE 














  11/16/19





  21:20


 


WBC 


 


RBC 


 


Hgb 


 


Hct 


 


MCV 


 


MCH 


 


MCHC 


 


RDW 


 


Plt Count 


 


MPV 


 


Neut # (Auto) 


 


Lymph # (Auto) 


 


Mono # (Auto) 


 


Eos # (Auto) 


 


Baso # (Auto) 


 


Absolute Nucleated RBC 


 


Nucleated RBC % 


 


Sodium  137


 


Potassium  3.4 L


 


Chloride  103


 


Carbon Dioxide  25


 


Anion Gap  9.0


 


BUN  16


 


Creatinine  0.6


 


Estimated GFR (MDRD)  113


 


Glucose  122 H


 


Calcium  9.4


 


Total Bilirubin  0.4


 


AST  24


 


ALT  41


 


Alkaline Phosphatase  68


 


Total Protein  7.8


 


Albumin  4.2


 


Globulin  3.6


 


Albumin/Globulin Ratio  1.2


 


Lipase  33


 


Urine Color 


 


Urine Clarity 


 


Urine pH 


 


Ur Specific Gravity 


 


Urine Protein 


 


Urine Glucose (UA) 


 


Urine Ketones 


 


Urine Occult Blood 


 


Urine Nitrite 


 


Urine Bilirubin 


 


Urine Urobilinogen 


 


Ur Leukocyte Esterase 


 


Ur Microscopic Review 


 


Urine Culture Comments 


 


Urine HCG, Qual 














- Rads (name of study)


  ** abd CT


Radiology: Prelim report reviewed (no kidney stones nor other acute process at 

area of the pain. 4 cm cysts x 2 noted in pelvis, concern about cyst vs 

neoplasm, and suggests U/S.), See rad report





  ** pelvic U/S


Radiology: Prelim report reviewed (7 cm curved cyst without any signs of 

bleeding/rupture and ovary has good blood flow. ), See rad report





PD MEDICAL DECISION MAKING





- ED course


Complexity details: reviewed results (normal CT except for ovarian cyst with 

U/S; she is feeling better with meds which included GI cocktail. So consider 

referred pain to back from stomach process, such as gastritis from current 

Clinda.), considered differential (sounded like kidney stone or infection. 

Consider vascular, or colonic such as divertic. Does not have musculoskeletal 

qualities. ), d/w patient





Departure





- Departure


Disposition: 01 Home, Self Care


Clinical Impression: 


 Acute flank pain





Condition: Stable


Record reviewed to determine appropriate education?: Yes


Instructions:  ED Flank Pain Uncertain Cause


Follow-Up: 


Gregory Clark MD [Primary Care Provider] - 


Haleigh Jara MD [Provider Admit Priv/Credential] - 


Prescriptions: 


Famotidine 20 mg PO DAILY #30 tablet


Hydrocodone/Acetaminophen [Norco 5-325 Tablet] 1 each PO Q6H PRN #15 tablet


 PRN Reason: Pain


Lidocaine Viscous 2% [Xylocaine Viscous 2%] 5 ml PO Q4H PRN #100 ml


 PRN Reason: Pain


Comments: 


Is not clear the cause of your pain in the back.  The CT scan did not show any 

signs of kidney stones or upper abdominal organ problems or colon problems.  

There were ovarian cysts found on your CT scan which on ultrasound are seen as 

mainly one large cyst that is curved.  It has good blood flow and no signs of 

leaking or bleeding so I do not think this is the cause of your pain.





You could follow-up with gynecology in the next few weeks for evaluation of the 

cyst and see if it improves on its own.





For the back pain, it could be musculoskeletal though it does not really have th

e character of it without hurting to move for such.  Given the doxycycline you 

are on, it is possible it could be an irritated stomach with referring pain to 

the back.  With that in mind we can use some famotidine acid reducing medicine 

daily for the next week or 2 and some antacid such as Mylanta or Maalox combined

with some lidocaine if that helps the pain as well.  





Tylenol if needed for pain and add hydrocodone if needed for worse pain.  





Recheck if not improved well over the next couple of days and return sooner if 

you have increased persistent pain, increasing pain, other symptoms such as 

fever vomiting rash or other concerns.


Discharge Date/Time: 11/17/19 01:44

## 2019-11-16 NOTE — CT REPORT
Reason:  left flank pain abrupt today

Procedure Date:  11/16/2019   

Accession Number:  [id number] / [id number]                    

Procedure:  CT  - Abdomen/Pelvis WO CPT Code:  

 

***Final Report***

 

 

FULL RESULT:

 

 

EXAM:

CT ABDOMEN AND PELVIS (CT KUB)

 

EXAM DATE: 11/16/2019 10:33 PM.

 

CLINICAL HISTORY: Left flank pain abrupt today.

 

COMPARISONS: None.

 

TECHNIQUE: Routine axial helical CT imaging was performed through the 

abdomen and pelvis without IV contrast. Reconstructions: Coronal and 

sagittal.

 

In accordance with CT protocol optimization, one or more of the following 

dose reduction techniques were utilized for this exam: automated exposure 

control, adjustment of mA and/or KV based on patient size, or use of 

iterative reconstructive technique.

 

FINDINGS:

Lung Bases: Unremarkable.

 

Right Kidney/Ureter: No stones, hydronephrosis, or hydroureter. No 

perinephric fat stranding.

 

Left Kidney/Ureter: No stones, hydronephrosis, or hydroureter. No 

perinephric fat stranding.

 

Other Solid Organs: Noncontrast images of the solid organs are grossly 

unremarkable.

 

Gallbladder/Bile Ducts: Unremarkable.

 

Peritoneal Cavity: No free air. No bowel abnormality. Normal appendix. No 

adenopathy.

 

Pelvic Organs: 2 adjacent 4.5 cm cysts with perceptible walls superior to 

the uterus abutting both ovaries. Unremarkable uterus and bladder. Small 

amount of free fluid.

 

Vasculature: Unremarkable.

 

Other: None.

IMPRESSION:

1. No kidney or ureteral stones or obstruction.

2. Abutting 4.5 cm cysts superior to the uterus adjacent to both ovaries 

concerning for ovarian neoplasm. Consider ultrasound evaluation.

 

[facility]

## 2019-11-17 VITALS — DIASTOLIC BLOOD PRESSURE: 74 MMHG | SYSTOLIC BLOOD PRESSURE: 135 MMHG

## 2019-11-17 NOTE — ULTRASOUND REPORT
Reason:  ovarian cysts on CT; U/S recommended

Procedure Date:  11/16/2019   

Accession Number:  [id number] / [id number]                    

Procedure:  US  - Pelvic w/Doppler Limited CPT Code:  

 

***Final Report***

 

 

FULL RESULT:

 

 

EXAM:

PELVIC ULTRASOUND WITH DOPPLERS

 

CLINICAL HISTORY: Ovarian cysts on CT;  U/S recommended. Left-sided pain

 

COMPARISON: ABDOMEN/PELVIS W/O 11/16/2019 10:25 PM

 

TECHNIQUE: Realtime transabdominal imaging performed to identify the 

uterus and adnexa and as an overview of other pelvic structures,  with 

static image documentation. The patient declined transvaginal scanning.

 

Color flow imaging and Doppler spectral analysis was performed to 

evaluate blood flow to the ovaries given pelvic pain and clinical concern 

for ovarian torsion.

 

FINDINGS:

Uterus: 8.6 x 4.3 x 3.0 cm, volume 57 cc. Anteverted position. Normal 

overall size and echotexture.

 

Masses: None.

 

Endometrium: 4 mm. Normal.

 

Cervix: Unremarkable.

 

Right Ovary: 10.2 x 6.9 x 4.7 cm, volume 174 cc. Complex lobulated cystic 

lesion, measuring 7.6 x 6.8 x 5.2 cm, correlating with the CT 

abnormality. Simple 2.3 cm cyst. Arterial and venous blood flow are 

present. PSV 14.8 cm/sec. RI 0.6.

Adnexa are unremarkable.

 

Left Ovary: 3.6 x 2.6 x 2.2 cm, volume 11 cc. Normal echotexture. 

Arterial and venous blood flow are present. PSV 14.9 cm/sec. RI 0.7.

Adnexa are unremarkable.

 

Free Fluid: None.

 

Other: None.

IMPRESSION:

1. Multiple right ovarian cysts, with a dominant 7.6 cm lobulated cystic 

lesion extending across the anterior surface of the uterus, accounting 

for the CT abnormality.

2. Arterial and venous blood flow are present to the ovaries bilaterally.

 

[facility]

## 2019-11-18 ENCOUNTER — HOSPITAL ENCOUNTER (OUTPATIENT)
Dept: HOSPITAL 76 - LAB.R | Age: 34
Discharge: HOME | End: 2019-11-18
Attending: OBSTETRICS & GYNECOLOGY
Payer: COMMERCIAL

## 2019-11-18 DIAGNOSIS — R19.00: Primary | ICD-10-CM

## 2019-11-18 PROCEDURE — 87591 N.GONORRHOEAE DNA AMP PROB: CPT

## 2019-11-18 PROCEDURE — 87661 TRICHOMONAS VAGINALIS AMPLIF: CPT

## 2019-11-18 PROCEDURE — 87491 CHLMYD TRACH DNA AMP PROBE: CPT

## 2019-11-19 LAB — T VAGINALIS RRNA GENITAL QL PROBE: NEGATIVE

## 2019-11-22 ENCOUNTER — HOSPITAL ENCOUNTER (OUTPATIENT)
Dept: HOSPITAL 76 - LAB | Age: 34
Discharge: HOME | End: 2019-11-22
Attending: OBSTETRICS & GYNECOLOGY
Payer: COMMERCIAL

## 2019-11-22 DIAGNOSIS — E88.81: ICD-10-CM

## 2019-11-22 DIAGNOSIS — E78.5: Primary | ICD-10-CM

## 2019-11-22 LAB
CHOLEST SERPL-MCNC: 221 MG/DL
HDLC SERPL-MCNC: 37 MG/DL
HDLC SERPL: 6 {RATIO} (ref ?–4.4)
LDLC SERPL CALC-MCNC: 150 MG/DL
LDLC/HDLC SERPL: 4.1 {RATIO} (ref ?–4.4)
VLDLC SERPL-SCNC: 34 MG/DL

## 2019-11-22 PROCEDURE — 81599 UNLISTED MAAA: CPT

## 2019-11-22 PROCEDURE — 83721 ASSAY OF BLOOD LIPOPROTEIN: CPT

## 2019-11-22 PROCEDURE — 36415 COLL VENOUS BLD VENIPUNCTURE: CPT

## 2019-11-22 PROCEDURE — 80061 LIPID PANEL: CPT

## 2019-11-22 PROCEDURE — 83498 ASY HYDROXYPROGESTERONE 17-D: CPT

## 2019-11-23 ENCOUNTER — HOSPITAL ENCOUNTER (OUTPATIENT)
Dept: HOSPITAL 76 - DI | Age: 34
Discharge: HOME | End: 2019-11-23
Attending: OBSTETRICS & GYNECOLOGY
Payer: COMMERCIAL

## 2019-11-23 DIAGNOSIS — N83.201: Primary | ICD-10-CM

## 2019-11-23 PROCEDURE — 76830 TRANSVAGINAL US NON-OB: CPT

## 2019-11-23 PROCEDURE — 76856 US EXAM PELVIC COMPLETE: CPT

## 2019-11-23 NOTE — ULTRASOUND REPORT
Reason:  PELVIC MASS

Procedure Date:  11/23/2019   

Accession Number:  [id number] / [id number]                    

Procedure:  US  - Pelvic w/Transvaginal CPT Code:  

 

***Final Report***

 

 

FULL RESULT:

 

 

EXAM:

PELVIC ULTRASOUND

 

EXAM DATE: 11/23/2019 03:43 PM.

 

CLINICAL HISTORY: Pelvic mass.

 

COMPARISON: TRANSABDOMINAL PELVIC NON OB W/DOPPLER LTD 11/17/2019 12:14 

AM.

ABDOMEN/PELVIS W/O 11/16/2019 10:25 PM.

 

TECHNIQUE: Realtime transabdominal pelvic scan performed to identify the 

uterus and adnexa and as an overview of other pelvic structures, followed 

by transvaginal scan to provide greater detail of the uterus and adnexa, 

with static image documentation.

 

FINDINGS:

Uterus: 7.3 x 3.3 x 4.7 cm, volume 61 cc. Anteverted position. Normal 

overall size and echotexture.

Masses: None.

Endometrium: 6 mm. Normal. Mild fluid within the lower canal. Initially, 

there was a 2 x 3 x 5 mm echogenic nodule within the lower uterine 

segment canal partially surrounded by fluid. This could not be 

redemonstrated later and therefore this may have represented mobile 

debris. A polyp is considered unlikely given that it could not be 

redemonstrated.

Cervix: Nabothian cysts.

 

Right Ovary: 6.0 x 5.2 x 10.4 cm, volume 168.2 cc. Again demonstrated is 

a complex cystic mass-like appearance in the right adnexal region and 

extending anterior to the uterus. The dominant portion measures 6.0 x 4.1 

x 7.7 cm, similar to the prior transabdominal only exam at which time it 

was reported as 7.6 x 6.8 x 5.1 cm (the degree of variation may be 

technical in nature). Peripheral internal vascularity is demonstrated on 

color Doppler. There is a simple 2.5 x 2.1 x 2.4 cm cyst at the margin of 

this.

Left Ovary: Only visualized transabdominally due to patient body habitus 

and overlying bowel gas. 2.6 x 1.9 x 3.3 cm, volume 8.7 cc. Grossly 

normal echotexture and blood flow.

 

Free Fluid: Mild free fluid within the cul-de-sac and right adnexal 

region.

 

Other: Patient reportedly is tender during scanning.

IMPRESSION:

1. Large complex multicystic mass-like right adnexal/ovarian process 

again demonstrated, extending anterior to the uterus, measuring up to at 

least 7.7 cm, similar to the prior transabdominal only exam.

2. Mild fluid within the cul-de-sac and right adnexal region.

3. Mild fluid within the lower endometrial/endocervical canal. Nodular 

filling defect within that initially demonstrated was not later 

redemonstrated and therefore may have represented mobile debris.

 

[facility]

## 2019-12-04 ENCOUNTER — HOSPITAL ENCOUNTER (OUTPATIENT)
Dept: HOSPITAL 76 - DI | Age: 34
Discharge: HOME | End: 2019-12-04
Attending: OBSTETRICS & GYNECOLOGY
Payer: COMMERCIAL

## 2019-12-04 DIAGNOSIS — R19.03: Primary | ICD-10-CM

## 2019-12-04 PROCEDURE — 76856 US EXAM PELVIC COMPLETE: CPT

## 2019-12-04 PROCEDURE — 76830 TRANSVAGINAL US NON-OB: CPT

## 2019-12-04 NOTE — ULTRASOUND REPORT
Reason:  PELVIC MASS

Procedure Date:  12/04/2019   

Accession Number:  [id number] / [id number]                    

Procedure:  US  - Pelvic w/Transvaginal CPT Code:  

 

***Final Report***

 

 

FULL RESULT:

 

 

EXAM: Pelvic w/Transvaginal

 

DATE: 12/4/2019 2:50 PM

 

CLINICAL HISTORY: The patient is a 34-year-old female with a complex 

right pelvic mass. Follow-up requested.

 

COMPARISON: 11/23/2019

 

TECHNIQUE: Realtime transabdominal imaging performed to identify the 

uterus and adnexa and as an overview of other pelvic structures, followed 

by transvaginal imaging for better assessment of the endometrium and/or 

adnexa, with static image documentation.

 

FINDINGS:

 

Uterus:  8.6 x 2.9 x 5.0 cm, volume 67 cc. Antegrade position. Normal 

overall size and echotexture. Few nabothian cysts.

 

Masses: None.

Endometrium: Normal.

Cervix: Unremarkable.

 

Right Ovary/Adnexa: 8.0 x 5.0 x 10.4 cm. The previously described complex 

solid/cystic mass is stable in appearance measuring approximately 8.0 x 

7.0 x 5.0 cm. The degree of peripheral vascularity is unchanged.

 

Left Ovary/Adnexa: 4.0 x 2.0 x 3.0 cm.  Normal echotexture. Blood flow is 

present. No adnexal mass is seen.

 

Free Fluid: Small amount of fluid in the cul-de-sac.

 

IMPRESSION:

Complex solid/cystic right adnexal mass is again identified measuring up 

to 8 cm in maximal dimension. There has been no change in size or 

morphology since the prior examination.

 

[facility]

## 2019-12-13 ENCOUNTER — HOSPITAL ENCOUNTER (OUTPATIENT)
Dept: HOSPITAL 76 - LAB.WCP | Age: 34
Discharge: HOME | End: 2019-12-13
Attending: OBSTETRICS & GYNECOLOGY
Payer: COMMERCIAL

## 2019-12-13 DIAGNOSIS — E88.81: ICD-10-CM

## 2019-12-13 DIAGNOSIS — E25.0: Primary | ICD-10-CM

## 2019-12-13 DIAGNOSIS — R19.00: ICD-10-CM

## 2019-12-13 DIAGNOSIS — N91.5: ICD-10-CM

## 2019-12-13 LAB
EST. AVERAGE GLUCOSE BLD GHB EST-MCNC: 108 MG/DL (ref 70–100)
HB2 TOTAL: 16 G/DL
HBA1C BLD-MCNC: 0.57 G/DL
HEMOGLOBIN A1C %: 5.4 % (ref 4.6–6.2)

## 2019-12-13 PROCEDURE — 83036 HEMOGLOBIN GLYCOSYLATED A1C: CPT

## 2019-12-13 PROCEDURE — 86304 IMMUNOASSAY TUMOR CA 125: CPT

## 2019-12-13 PROCEDURE — 82670 ASSAY OF TOTAL ESTRADIOL: CPT

## 2019-12-13 PROCEDURE — 82105 ALPHA-FETOPROTEIN SERUM: CPT

## 2019-12-13 PROCEDURE — 83615 LACTATE (LD) (LDH) ENZYME: CPT

## 2019-12-13 PROCEDURE — 36415 COLL VENOUS BLD VENIPUNCTURE: CPT

## 2019-12-13 PROCEDURE — 84702 CHORIONIC GONADOTROPIN TEST: CPT

## 2019-12-13 PROCEDURE — 84403 ASSAY OF TOTAL TESTOSTERONE: CPT

## 2019-12-14 LAB — ESTRADIOL SERPL-MCNC: 213 PG/ML

## 2020-01-04 ENCOUNTER — HOSPITAL ENCOUNTER (EMERGENCY)
Dept: HOSPITAL 76 - ED | Age: 35
Discharge: HOME | End: 2020-01-04
Payer: COMMERCIAL

## 2020-01-04 VITALS — DIASTOLIC BLOOD PRESSURE: 78 MMHG | SYSTOLIC BLOOD PRESSURE: 131 MMHG

## 2020-01-04 DIAGNOSIS — N83.201: Primary | ICD-10-CM

## 2020-01-04 DIAGNOSIS — N30.00: ICD-10-CM

## 2020-01-04 LAB
ALBUMIN DIAFP-MCNC: 3.9 G/DL (ref 3.2–5.5)
ALBUMIN/GLOB SERPL: 1.2 {RATIO} (ref 1–2.2)
ALP SERPL-CCNC: 57 IU/L (ref 42–121)
ALT SERPL W P-5'-P-CCNC: 27 IU/L (ref 10–60)
ANION GAP SERPL CALCULATED.4IONS-SCNC: 10 MMOL/L (ref 6–13)
AST SERPL W P-5'-P-CCNC: 19 IU/L (ref 10–42)
BASOPHILS NFR BLD AUTO: 0 10^3/UL (ref 0–0.1)
BASOPHILS NFR BLD AUTO: 0.5 %
BILIRUB BLD-MCNC: 0.5 MG/DL (ref 0.2–1)
BUN SERPL-MCNC: 11 MG/DL (ref 6–20)
CALCIUM UR-MCNC: 8.9 MG/DL (ref 8.5–10.3)
CHLORIDE SERPL-SCNC: 107 MMOL/L (ref 101–111)
CLARITY UR REFRACT.AUTO: (no result)
CO2 SERPL-SCNC: 23 MMOL/L (ref 21–32)
CREAT SERPLBLD-SCNC: 0.7 MG/DL (ref 0.4–1)
EOSINOPHIL # BLD AUTO: 0.2 10^3/UL (ref 0–0.7)
EOSINOPHIL NFR BLD AUTO: 2 %
ERYTHROCYTE [DISTWIDTH] IN BLOOD BY AUTOMATED COUNT: 11.8 % (ref 12–15)
GFRSERPLBLD MDRD-ARVRAT: 95 ML/MIN/{1.73_M2} (ref 89–?)
GLOBULIN SER-MCNC: 3.3 G/DL (ref 2.1–4.2)
GLUCOSE SERPL-MCNC: 133 MG/DL (ref 70–100)
GLUCOSE UR QL STRIP.AUTO: NEGATIVE MG/DL
HCG UR QL: NEGATIVE
HGB UR QL STRIP: 14.8 G/DL (ref 12–16)
KETONES UR QL STRIP.AUTO: NEGATIVE MG/DL
LIPASE SERPL-CCNC: 35 U/L (ref 22–51)
LYMPHOCYTES # SPEC AUTO: 2.2 10^3/UL (ref 1.5–3.5)
LYMPHOCYTES NFR BLD AUTO: 27.1 %
MCH RBC QN AUTO: 30.8 PG (ref 27–31)
MCHC RBC AUTO-ENTMCNC: 33.2 G/DL (ref 32–36)
MCV RBC AUTO: 92.9 FL (ref 81–99)
MONOCYTES # BLD AUTO: 0.5 10^3/UL (ref 0–1)
MONOCYTES NFR BLD AUTO: 6.3 %
NEUTROPHILS # BLD AUTO: 5.3 10^3/UL (ref 1.5–6.6)
NEUTROPHILS # SNV AUTO: 8.2 X10^3/UL (ref 4.8–10.8)
NEUTROPHILS NFR BLD AUTO: 64 %
NITRITE UR QL STRIP.AUTO: NEGATIVE
PDW BLD AUTO: 10 FL (ref 7.9–10.8)
PH UR STRIP.AUTO: 6 PH (ref 5–7.5)
PLATELET # BLD: 203 10^3/UL (ref 130–450)
PROT SPEC-MCNC: 7.2 G/DL (ref 6.7–8.2)
PROT UR STRIP.AUTO-MCNC: NEGATIVE MG/DL
RBC # UR STRIP.AUTO: (no result) /UL
RBC # URNS HPF: (no result) /HPF (ref 0–5)
RBC MAR: 4.8 10^6/UL (ref 4.2–5.4)
SODIUM SERPLBLD-SCNC: 140 MMOL/L (ref 135–145)
SP GR UR STRIP.AUTO: <=1.005 (ref 1–1.03)
SQUAMOUS URNS QL MICRO: (no result)
UROBILINOGEN UR QL STRIP.AUTO: (no result) E.U./DL
UROBILINOGEN UR STRIP.AUTO-MCNC: NEGATIVE MG/DL

## 2020-01-04 PROCEDURE — 80053 COMPREHEN METABOLIC PANEL: CPT

## 2020-01-04 PROCEDURE — 76856 US EXAM PELVIC COMPLETE: CPT

## 2020-01-04 PROCEDURE — 76830 TRANSVAGINAL US NON-OB: CPT

## 2020-01-04 PROCEDURE — 81001 URINALYSIS AUTO W/SCOPE: CPT

## 2020-01-04 PROCEDURE — 85025 COMPLETE CBC W/AUTO DIFF WBC: CPT

## 2020-01-04 PROCEDURE — 93975 VASCULAR STUDY: CPT

## 2020-01-04 PROCEDURE — 83690 ASSAY OF LIPASE: CPT

## 2020-01-04 PROCEDURE — 87086 URINE CULTURE/COLONY COUNT: CPT

## 2020-01-04 PROCEDURE — 96372 THER/PROPH/DIAG INJ SC/IM: CPT

## 2020-01-04 PROCEDURE — 99284 EMERGENCY DEPT VISIT MOD MDM: CPT

## 2020-01-04 PROCEDURE — 36415 COLL VENOUS BLD VENIPUNCTURE: CPT

## 2020-01-04 PROCEDURE — 81025 URINE PREGNANCY TEST: CPT

## 2020-01-04 NOTE — ED PHYSICIAN DOCUMENTATION
PD HPI ABD PAIN





- Stated complaint


Stated Complaint: ABD PX/FEMALE 





- Chief complaint


Chief Complaint: Abd Pain





- History obtained from


History obtained from: Patient





- History of Present Illness


Timing - onset: Yesterday (2)


Timing - duration: Days (2)


Timing - details: Gradual onset


Pain level max: 8


Pain level now: 5


Quality: Aching, Pain


Location: RLQ, LLQ


Radiation: No: Chest, , Lower back, Left flank, Left shoulder, Right flank, 

Right shoulder, Upper back


Improved by: Other (nothing)


Worsened by: Other (nothing)


Associated symptoms: No: Fever, Nausea, Vomiting, Hematemesis, Diarrhea, 

Constipation, Melena, Hematochezia, Dysuria, Hematuria, Vaginal bleeding, 

Vaginal dc


Similar symptoms before: Diagnosis (R ovarian cyst)





Review of Systems


Constitutional: denies: Fever, Chills


Nose: denies: Rhinorrhea / runny nose, Congestion


Throat: denies: Sore throat


Cardiac: denies: Chest pain / pressure


Respiratory: denies: Cough


GI: denies: Vomiting, Diarrhea


: denies: Dysuria, Frequency, Hesitancy


Skin: denies: Rash


Musculoskeletal: denies: Neck pain, Back pain


Neurologic: denies: Headache





PD PAST MEDICAL HISTORY





- Past Medical History


Past Medical History: Yes


Cardiovascular: None


Respiratory: None


Neuro: None


Endocrine/Autoimmune: Other


GI: None


GYN: None


: None


HEENT: None


Psych: None


Musculoskeletal: None


Derm: Rosacea





- Past Surgical History


Past Surgical History: No





- Present Medications


Home Medications: 


                                Ambulatory Orders











 Medication  Instructions  Recorded  Confirmed


 


Nitrofurantoin Monohyd/M-Cryst 100 mg PO BID #10 capsule 01/04/20 





[Macrobid 100 mg Capsule]   














- Allergies


Allergies/Adverse Reactions: 


                                    Allergies











Allergy/AdvReac Type Severity Reaction Status Date / Time


 


cephalexin Allergy  Hives Verified 01/04/20 20:24


 


fluconazole Allergy  Respiratory Verified 01/04/20 20:24


 


sulfamethoxazole Allergy  Respiratory Verified 01/04/20 20:24





[From Bactrim]     


 


trimethoprim [From Bactrim] Allergy  Respiratory Verified 01/04/20 20:24


 


bromopex AdvReac Severe problems Uncoded 01/04/20 20:24





   w/ airway  














- Social History


Does the pt smoke?: No


Smoking Status: Never smoker


Does the pt drink ETOH?: Yes


Does the pt have substance abuse?: No





- Immunizations


Immunizations are current?: Yes





- POLST


Patient has POLST: No





PD ED PE NORMAL





- Vitals


Vital signs reviewed: Yes





- General


General: Alert and oriented X 3, No acute distress, Well developed/nourished





- HEENT


HEENT: Moist mucous membranes





- Neck


Neck: Supple, no meningeal sign





- Cardiac


Cardiac: RRR, Strong equal pulses





- Respiratory


Respiratory: No respiratory distress, Clear bilaterally





- Abdomen


Abdomen: Soft, Non tender, Non distended





- Back


Back: No CVA TTP





- Derm


Derm: Warm and dry, No rash





- Extremities


Extremities: No edema





- Neuro


Neuro: Alert and oriented X 3





- Psych


Psych: Normal mood, Normal affect





Results





- Vitals


Vitals: 


                               Vital Signs - 24 hr











  01/04/20 01/04/20





  19:55 22:12


 


Temperature 36.7 C 36.7 C


 


Heart Rate 84 71


 


Respiratory 16 14





Rate  


 


Blood Pressure 146/75 H 131/78 H


 


O2 Saturation 100 98








                                     Oxygen











O2 Source                      Room air

















- Labs


Labs: 


                                Laboratory Tests











  01/04/20 01/04/20 01/04/20





  20:32 21:00 21:00


 


WBC   8.2 


 


RBC   4.80 


 


Hgb   14.8 


 


Hct   44.6 


 


MCV   92.9 


 


MCH   30.8 


 


MCHC   33.2 


 


RDW   11.8 L 


 


Plt Count   203 


 


MPV   10.0 


 


Neut # (Auto)   5.3 


 


Lymph # (Auto)   2.2 


 


Mono # (Auto)   0.5 


 


Eos # (Auto)   0.2 


 


Baso # (Auto)   0.0 


 


Absolute Nucleated RBC   0.00 


 


Nucleated RBC %   0.0 


 


Sodium    140


 


Potassium    3.4 L


 


Chloride    107


 


Carbon Dioxide    23


 


Anion Gap    10.0


 


BUN    11


 


Creatinine    0.7


 


Estimated GFR (MDRD)    95


 


Glucose    133 H


 


Calcium    8.9


 


Total Bilirubin    0.5


 


AST    19


 


ALT    27


 


Alkaline Phosphatase    57


 


Total Protein    7.2


 


Albumin    3.9


 


Globulin    3.3


 


Albumin/Globulin Ratio    1.2


 


Lipase    35


 


Urine Color  LIGHT YELLOW  


 


Urine Clarity  HAZY  


 


Urine pH  6.0  


 


Ur Specific Gravity  <=1.005  


 


Urine Protein  NEGATIVE  


 


Urine Glucose (UA)  NEGATIVE  


 


Urine Ketones  NEGATIVE  


 


Urine Occult Blood  TRACE-INTA  


 


Urine Nitrite  NEGATIVE  


 


Urine Bilirubin  NEGATIVE  


 


Urine Urobilinogen  0.2 (NORMAL)  


 


Ur Leukocyte Esterase  MODERATE H  


 


Urine RBC  0-5  


 


Urine WBC  11-25 H  


 


Ur Squamous Epith Cells  FEW Squamous  


 


Urine Bacteria  Moderate H  


 


Urine Culture Comments  INDICATED  


 


Urine HCG, Qual  NEGATIVE  














- Rads (name of study)


  ** Pelvic ultrasound


Radiology: Prelim report reviewed, EMP read contemporaneously, See rad report 

(1. Enlarged right ovary measuring 117 cc with complex cystic and solid 

appearance. This is similar compared with the prior ultrasound. 2. Arterial and 

venous blood flow are present to the ovaries bilaterally. 3. Small amount of 

free fluid. )





PD MEDICAL DECISION MAKING





- ED course


Complexity details: reviewed results, re-evaluated patient, considered 

differential, d/w patient


ED course: 





34-year-old female presents to the emergency department with ongoing abdominal 

pain.  No evidence of torsion.  Pain improved with Toradol.  She does have a UTI

and we will place her on Macrobid for this.  She is well-appearing, nontoxic.  

Afebrile.  No vaginal discharge.  No change in sexual partners.  Patient has an 

appointment with GYN onc in 1.5 weeks.  Patient counseled regarding signs and 

symptoms for which I believe and urgent re-evaluation would be necessary. 

Patient with good understanding of and agreement to plan and is comfortable 

going home at this time 





This document was made in part using voice recognition software. While efforts 

are made to proofread this document, sound alike and grammatical errors may 

occur.





Departure





- Departure


Disposition: 01 Home, Self Care


Clinical Impression: 


Cyst of ovary


Qualifiers:


 Laterality: right Qualified Code(s): N83.201 - Unspecified ovarian cyst, right 

side





UTI (urinary tract infection)


Qualifiers:


 Urinary tract infection type: acute cystitis Hematuria presence: without 

hematuria Qualified Code(s): N30.00 - Acute cystitis without hematuria





Condition: Good


Instructions:  ED UTI Cystitis Female


Follow-Up: 


Gregory Clark MD [Primary Care Provider] - Within 1 week


Prescriptions: 


Nitrofurantoin Monohyd/M-Cryst [Macrobid 100 mg Capsule] 100 mg PO BID #10 

capsule


Comments: 


Take all antibiotics until gone.  Return if you worsen.  Follow-up with 

gynecology as scheduled for your ovarian cyst.  You can continue to use Motrin 

and Tylenol as needed for pain at home.


Discharge Date/Time: 01/04/20 22:15

## 2020-01-30 ENCOUNTER — HOSPITAL ENCOUNTER (EMERGENCY)
Dept: HOSPITAL 76 - ED | Age: 35
Discharge: HOME | End: 2020-01-30
Payer: COMMERCIAL

## 2020-01-30 VITALS — DIASTOLIC BLOOD PRESSURE: 77 MMHG | SYSTOLIC BLOOD PRESSURE: 120 MMHG

## 2020-01-30 DIAGNOSIS — N83.201: Primary | ICD-10-CM

## 2020-01-30 LAB
ALBUMIN DIAFP-MCNC: 4.3 G/DL (ref 3.2–5.5)
ALBUMIN/GLOB SERPL: 1.2 {RATIO} (ref 1–2.2)
ALP SERPL-CCNC: 62 IU/L (ref 42–121)
ALT SERPL W P-5'-P-CCNC: 30 IU/L (ref 10–60)
ANION GAP SERPL CALCULATED.4IONS-SCNC: 10 MMOL/L (ref 6–13)
AST SERPL W P-5'-P-CCNC: 19 IU/L (ref 10–42)
BASOPHILS NFR BLD AUTO: 0.1 10^3/UL (ref 0–0.1)
BASOPHILS NFR BLD AUTO: 0.7 %
BILIRUB BLD-MCNC: 0.3 MG/DL (ref 0.2–1)
BUN SERPL-MCNC: 14 MG/DL (ref 6–20)
CALCIUM UR-MCNC: 9.1 MG/DL (ref 8.5–10.3)
CHLORIDE SERPL-SCNC: 102 MMOL/L (ref 101–111)
CLARITY UR REFRACT.AUTO: CLEAR
CO2 SERPL-SCNC: 25 MMOL/L (ref 21–32)
CREAT SERPLBLD-SCNC: 0.6 MG/DL (ref 0.4–1)
EOSINOPHIL # BLD AUTO: 0.2 10^3/UL (ref 0–0.7)
EOSINOPHIL NFR BLD AUTO: 2.1 %
ERYTHROCYTE [DISTWIDTH] IN BLOOD BY AUTOMATED COUNT: 11.6 % (ref 12–15)
GFRSERPLBLD MDRD-ARVRAT: 113 ML/MIN/{1.73_M2} (ref 89–?)
GLOBULIN SER-MCNC: 3.7 G/DL (ref 2.1–4.2)
GLUCOSE SERPL-MCNC: 96 MG/DL (ref 70–100)
GLUCOSE UR QL STRIP.AUTO: NEGATIVE MG/DL
HCG UR QL: NEGATIVE
HGB UR QL STRIP: 16.1 G/DL (ref 12–16)
KETONES UR QL STRIP.AUTO: NEGATIVE MG/DL
LIPASE SERPL-CCNC: 29 U/L (ref 22–51)
LYMPHOCYTES # SPEC AUTO: 2.2 10^3/UL (ref 1.5–3.5)
LYMPHOCYTES NFR BLD AUTO: 28.7 %
MCH RBC QN AUTO: 31.5 PG (ref 27–31)
MCHC RBC AUTO-ENTMCNC: 34.5 G/DL (ref 32–36)
MCV RBC AUTO: 91.4 FL (ref 81–99)
MONOCYTES # BLD AUTO: 0.5 10^3/UL (ref 0–1)
MONOCYTES NFR BLD AUTO: 6.5 %
NEUTROPHILS # BLD AUTO: 4.7 10^3/UL (ref 1.5–6.6)
NEUTROPHILS # SNV AUTO: 7.7 X10^3/UL (ref 4.8–10.8)
NEUTROPHILS NFR BLD AUTO: 61.7 %
NITRITE UR QL STRIP.AUTO: NEGATIVE
PDW BLD AUTO: 9.9 FL (ref 7.9–10.8)
PH UR STRIP.AUTO: 6 PH (ref 5–7.5)
PLATELET # BLD: 204 10^3/UL (ref 130–450)
PROT SPEC-MCNC: 8 G/DL (ref 6.7–8.2)
PROT UR STRIP.AUTO-MCNC: NEGATIVE MG/DL
RBC # UR STRIP.AUTO: (no result) /UL
RBC # URNS HPF: (no result) /HPF (ref 0–5)
RBC MAR: 5.11 10^6/UL (ref 4.2–5.4)
SODIUM SERPLBLD-SCNC: 137 MMOL/L (ref 135–145)
SP GR UR STRIP.AUTO: 1.02 (ref 1–1.03)
SQUAMOUS URNS QL MICRO: (no result)
UROBILINOGEN UR QL STRIP.AUTO: (no result) E.U./DL
UROBILINOGEN UR STRIP.AUTO-MCNC: NEGATIVE MG/DL

## 2020-01-30 PROCEDURE — 93975 VASCULAR STUDY: CPT

## 2020-01-30 PROCEDURE — 76856 US EXAM PELVIC COMPLETE: CPT

## 2020-01-30 PROCEDURE — 81003 URINALYSIS AUTO W/O SCOPE: CPT

## 2020-01-30 PROCEDURE — 76830 TRANSVAGINAL US NON-OB: CPT

## 2020-01-30 PROCEDURE — 83690 ASSAY OF LIPASE: CPT

## 2020-01-30 PROCEDURE — 36415 COLL VENOUS BLD VENIPUNCTURE: CPT

## 2020-01-30 PROCEDURE — 87086 URINE CULTURE/COLONY COUNT: CPT

## 2020-01-30 PROCEDURE — 81001 URINALYSIS AUTO W/SCOPE: CPT

## 2020-01-30 PROCEDURE — 99284 EMERGENCY DEPT VISIT MOD MDM: CPT

## 2020-01-30 PROCEDURE — 85025 COMPLETE CBC W/AUTO DIFF WBC: CPT

## 2020-01-30 PROCEDURE — 80053 COMPREHEN METABOLIC PANEL: CPT

## 2020-01-30 PROCEDURE — 81025 URINE PREGNANCY TEST: CPT

## 2020-01-30 NOTE — ED PHYSICIAN DOCUMENTATION
PD HPI ABD PAIN





- Stated complaint


Stated Complaint: LOWER ABD PX





- Chief complaint


Chief Complaint: Abd Pain





- History obtained from


History obtained from: Patient (34-year-old woman with known large right ovarian

cyst, has been worked up and is planning surgery with a gynecologist/oncologist 

but is pending clearance by a reproductive endocrinologist because of history of

adrenal hyperplasia.  She developed sudden severe pelvic pain about 40 minutes 

ago.  She was in no pain this morning.  No nausea.  Declines pain medication on 

initial evaluation as she is scared of side effects.)





Review of Systems


Ten Systems: 10 systems reviewed and negative


Constitutional: denies: Fever, Chills


Cardiac: denies: Chest pain / pressure, Palpitations


Respiratory: denies: Dyspnea, Cough





PD PAST MEDICAL HISTORY





- Past Medical History


Cardiovascular: None


Respiratory: None


Neuro: None


Endocrine/Autoimmune: Other


GI: None


GYN: None


: None


HEENT: None


Psych: None


Musculoskeletal: None


Derm: Rosacea





- Past Surgical History


Past Surgical History: No





- Present Medications


Home Medications: 


                                Ambulatory Orders











 Medication  Instructions  Recorded  Confirmed


 


Nitrofurantoin Monohyd/M-Cryst 100 mg PO BID #10 capsule 01/04/20 





[Macrobid 100 mg Capsule]   














- Allergies


Allergies/Adverse Reactions: 


                                    Allergies











Allergy/AdvReac Type Severity Reaction Status Date / Time


 


cephalexin Allergy  Hives Verified 01/04/20 20:24


 


fluconazole Allergy  Respiratory Verified 01/04/20 20:24


 


sulfamethoxazole Allergy  Respiratory Verified 01/04/20 20:24





[From Bactrim]     


 


trimethoprim [From Bactrim] Allergy  Respiratory Verified 01/04/20 20:24


 


bromopex AdvReac Severe problems Uncoded 01/04/20 20:24





   w/ airway  














- Social History


Does the pt smoke?: No


Smoking Status: Never smoker


Does the pt drink ETOH?: Yes


Does the pt have substance abuse?: No





- Immunizations


Immunizations are current?: Yes





- POLST


Patient has POLST: No





PD ED PE NORMAL





- Vitals


Vital signs reviewed: Yes





- General


General: Alert and oriented X 3, No acute distress





- HEENT


HEENT: PERRL, EOMI





- Neck


Neck: Supple, no meningeal sign, No bony TTP





- Cardiac


Cardiac: RRR, No murmur





- Respiratory


Respiratory: No respiratory distress, Clear bilaterally





- Abdomen


Abdomen: Soft, Non tender





- Back


Back: No CVA TTP, No spinal TTP





- Derm


Derm: Normal color, Warm and dry





- Extremities


Extremities: No edema, No calf tenderness / cord





- Neuro


Neuro: Alert and oriented X 3, Normal speech





Results





- Vitals


Vitals: 


                               Vital Signs - 24 hr











  01/30/20





  15:33


 


Temperature 36.7 C


 


Heart Rate 79


 


Respiratory 16





Rate 


 


Blood Pressure 120/77


 


O2 Saturation 100








                                     Oxygen











O2 Source                      Room air

















- Labs


Labs: 


                                Laboratory Tests











  01/30/20 01/30/20 01/30/20





  15:42 16:10 16:10


 


WBC   7.7 


 


RBC   5.11 


 


Hgb   16.1 H 


 


Hct   46.7 


 


MCV   91.4 


 


MCH   31.5 H 


 


MCHC   34.5 


 


RDW   11.6 L 


 


Plt Count   204 


 


MPV   9.9 


 


Neut # (Auto)   4.7 


 


Lymph # (Auto)   2.2 


 


Mono # (Auto)   0.5 


 


Eos # (Auto)   0.2 


 


Baso # (Auto)   0.1 


 


Absolute Nucleated RBC   0.00 


 


Nucleated RBC %   0.0 


 


Sodium    137


 


Potassium    3.7


 


Chloride    102


 


Carbon Dioxide    25


 


Anion Gap    10.0


 


BUN    14


 


Creatinine    0.6


 


Estimated GFR (MDRD)    113


 


Glucose    96


 


Calcium    9.1


 


Total Bilirubin    0.3


 


AST    19


 


ALT    30


 


Alkaline Phosphatase    62


 


Total Protein    8.0


 


Albumin    4.3


 


Globulin    3.7


 


Albumin/Globulin Ratio    1.2


 


Lipase    29


 


Urine Color  YELLOW  


 


Urine Clarity  CLEAR  


 


Urine pH  6.0  


 


Ur Specific Gravity  1.025  


 


Urine Protein  NEGATIVE  


 


Urine Glucose (UA)  NEGATIVE  


 


Urine Ketones  NEGATIVE  


 


Urine Occult Blood  SMALL H  


 


Urine Nitrite  NEGATIVE  


 


Urine Bilirubin  NEGATIVE  


 


Urine Urobilinogen  0.2 (NORMAL)  


 


Ur Leukocyte Esterase  TRACE H  


 


Urine RBC  6-10 H  


 


Urine WBC  6-10 H  


 


Ur Squamous Epith Cells  FEW Squamous  


 


Urine Bacteria  Many H  


 


Ur Microscopic Review  INDICATED  


 


Urine Culture Comments  INDICATED  


 


Urine HCG, Qual  NEGATIVE  














PD MEDICAL DECISION MAKING





- ED course


ED course: 





34-year-old woman with known complicated ovarian cyst undergoing work-up for 

malignancy presents with new severe pelvic pain and the main concern of course 

is for ovarian torsion which is not present on repeat ultrasound which shows no 

changes.  She declined pain medication while here.


Urine noted but symptoms are not really typical for a UTI, so would wait for 

culture for that.





Departure





- Departure


Disposition: 01 Home, Self Care


Clinical Impression: 


Cyst of ovary


Qualifiers:


 Laterality: unspecified laterality Qualified Code(s): N83.209 - Unspecified 

ovarian cyst, unspecified side





Condition: Good


Record reviewed to determine appropriate education?: Yes


Instructions:  ED Pelvic Pain [facility]


Comments: 


The ultrasound shows no evidence of torsion and no other interval change, 

continue the current plan of action with oncology/gynecology and endocrinology. 

Return for new or worsening symptoms.


Discharge Date/Time: 01/30/20 18:20

## 2020-01-30 NOTE — ULTRASOUND REPORT
Reason:  pelvic pain, R

Procedure Date:  01/30/2020   

Accession Number:  [id number] / [id number]                    

Procedure:  US  - Pelvic w/Transvag+Doppler Comp CPT Code:  

 

***Final Report***

 

 

FULL RESULT:

 

 

EXAM:

PELVIC ULTRASOUND WITH DOPPLERS

 

CLINICAL HISTORY: Pelvic pain, R.

 

COMPARISON: 01/04/2020

 

TECHNIQUE: Realtime transabdominal imaging performed to identify the 

uterus and adnexa and as an overview of other pelvic structures, followed 

by transvaginal imaging for better assessment of the endometrium and 

adnexa, with static image documentation.

 

Color flow imaging and Doppler spectral analysis was performed to 

evaluate blood flow to the ovaries given pelvic pain and clinical concern 

for ovarian torsion.

 

FINDINGS:

Uterus: 8 x 3 x 4.3 cm, volume 54.1 cc. Anteverted position. Normal 

overall size and echotexture.

 

Masses: None.

 

Endometrium: 6 mm. No focal endometrial abnormalities.

 

Cervix: Multiple nabothian cysts. Endocervical fluid is present.

 

Right Ovary: 10.2 x 5.1 x 6.7 cm, volume 182.5 cc. Similar appearance of 

a complex solid and cystic masslike appearance. Arterial and venous blood 

flow are present. PSV 8.8 cm/sec. RI 0.6.

Adnexa are unremarkable.

 

Left Ovary: 3.5 x 1.8 x 2.9 cm, volume 9.3 cc. Normal echotexture. 

Arterial and venous blood flow are present. PSV 11.3 cm/sec. RI 0.6.

Adnexa are unremarkable.

 

Free Fluid: None.

 

Other: None.

IMPRESSION:

 

Redemonstration of complex solid and cystic appearance to an enlarged 

right ovary, similar to multiple priors. If not already performed, 

nonemergent contrast-enhanced MRI is recommended for further evaluation.

 

Blood flow is identified to the bilateral ovaries.

 

[facility]

## 2020-04-22 ENCOUNTER — HOSPITAL ENCOUNTER (EMERGENCY)
Dept: HOSPITAL 76 - ED | Age: 35
Discharge: HOME | End: 2020-04-22
Payer: COMMERCIAL

## 2020-04-22 VITALS — SYSTOLIC BLOOD PRESSURE: 134 MMHG | DIASTOLIC BLOOD PRESSURE: 80 MMHG

## 2020-04-22 DIAGNOSIS — R10.30: Primary | ICD-10-CM

## 2020-04-22 DIAGNOSIS — Z87.42: ICD-10-CM

## 2020-04-22 DIAGNOSIS — Z90.721: ICD-10-CM

## 2020-04-22 LAB
ALBUMIN DIAFP-MCNC: 4.6 G/DL (ref 3.2–5.5)
ALBUMIN/GLOB SERPL: 1.4 {RATIO} (ref 1–2.2)
ALP SERPL-CCNC: 76 IU/L (ref 42–121)
ALT SERPL W P-5'-P-CCNC: 36 IU/L (ref 10–60)
ANION GAP SERPL CALCULATED.4IONS-SCNC: 8 MMOL/L (ref 6–13)
AST SERPL W P-5'-P-CCNC: 36 IU/L (ref 10–42)
BASOPHILS NFR BLD AUTO: 0 10^3/UL (ref 0–0.1)
BASOPHILS NFR BLD AUTO: 0.4 %
BILIRUB BLD-MCNC: 0.6 MG/DL (ref 0.2–1)
BUN SERPL-MCNC: 13 MG/DL (ref 6–20)
CALCIUM UR-MCNC: 9.4 MG/DL (ref 8.5–10.3)
CHLORIDE SERPL-SCNC: 101 MMOL/L (ref 101–111)
CLARITY UR REFRACT.AUTO: CLEAR
CO2 SERPL-SCNC: 28 MMOL/L (ref 21–32)
CREAT SERPLBLD-SCNC: 0.7 MG/DL (ref 0.4–1)
EOSINOPHIL # BLD AUTO: 0.1 10^3/UL (ref 0–0.7)
EOSINOPHIL NFR BLD AUTO: 0.9 %
ERYTHROCYTE [DISTWIDTH] IN BLOOD BY AUTOMATED COUNT: 12.1 % (ref 12–15)
GLOBULIN SER-MCNC: 3.3 G/DL (ref 2.1–4.2)
GLUCOSE SERPL-MCNC: 107 MG/DL (ref 70–100)
GLUCOSE UR QL STRIP.AUTO: NEGATIVE MG/DL
HGB UR QL STRIP: 15.8 G/DL (ref 12–16)
KETONES UR QL STRIP.AUTO: NEGATIVE MG/DL
LIPASE SERPL-CCNC: 39 U/L (ref 22–51)
LYMPHOCYTES # SPEC AUTO: 2.3 10^3/UL (ref 1.5–3.5)
LYMPHOCYTES NFR BLD AUTO: 22.7 %
MCH RBC QN AUTO: 31 PG (ref 27–31)
MCHC RBC AUTO-ENTMCNC: 33.5 G/DL (ref 32–36)
MCV RBC AUTO: 92.7 FL (ref 81–99)
MONOCYTES # BLD AUTO: 0.5 10^3/UL (ref 0–1)
MONOCYTES NFR BLD AUTO: 5 %
NEUTROPHILS # BLD AUTO: 7.3 10^3/UL (ref 1.5–6.6)
NEUTROPHILS # SNV AUTO: 10.3 X10^3/UL (ref 4.8–10.8)
NEUTROPHILS NFR BLD AUTO: 70.7 %
NITRITE UR QL STRIP.AUTO: NEGATIVE
PDW BLD AUTO: 9.9 FL (ref 7.9–10.8)
PH UR STRIP.AUTO: 6.5 PH (ref 5–7.5)
PLATELET # BLD: 221 10^3/UL (ref 130–450)
PROT SPEC-MCNC: 7.9 G/DL (ref 6.7–8.2)
PROT UR STRIP.AUTO-MCNC: NEGATIVE MG/DL
RBC # UR STRIP.AUTO: NEGATIVE /UL
RBC MAR: 5.09 10^6/UL (ref 4.2–5.4)
SODIUM SERPLBLD-SCNC: 137 MMOL/L (ref 135–145)
SP GR UR STRIP.AUTO: <=1.005 (ref 1–1.03)
UROBILINOGEN UR QL STRIP.AUTO: (no result) E.U./DL
UROBILINOGEN UR STRIP.AUTO-MCNC: NEGATIVE MG/DL

## 2020-04-22 PROCEDURE — 80053 COMPREHEN METABOLIC PANEL: CPT

## 2020-04-22 PROCEDURE — 81001 URINALYSIS AUTO W/SCOPE: CPT

## 2020-04-22 PROCEDURE — 83690 ASSAY OF LIPASE: CPT

## 2020-04-22 PROCEDURE — 99284 EMERGENCY DEPT VISIT MOD MDM: CPT

## 2020-04-22 PROCEDURE — 87086 URINE CULTURE/COLONY COUNT: CPT

## 2020-04-22 PROCEDURE — 81003 URINALYSIS AUTO W/O SCOPE: CPT

## 2020-04-22 PROCEDURE — 85025 COMPLETE CBC W/AUTO DIFF WBC: CPT

## 2020-04-22 PROCEDURE — 36415 COLL VENOUS BLD VENIPUNCTURE: CPT

## 2020-04-22 PROCEDURE — 76830 TRANSVAGINAL US NON-OB: CPT

## 2020-04-22 PROCEDURE — 93975 VASCULAR STUDY: CPT

## 2020-04-22 PROCEDURE — 76856 US EXAM PELVIC COMPLETE: CPT

## 2020-04-22 NOTE — ED PHYSICIAN DOCUMENTATION
PD HPI ABD PAIN





- Stated complaint


Stated Complaint: L SIDE PAIN





- Chief complaint


Chief Complaint: Abd Pain





- History obtained from


History obtained from: Patient (34-year-old female who presents today with left-

sided abdominal pain, ongoing for roughly a week now.  She relates that she had 

the same symptoms started back in November 2019, was seen in the emergency room 

multiple times, and diagnosed with a right ovarian cyst.  She was followed up 

with OB/GYN, ultimately had a right ovarian cyst removed on February 19, 2020.  

She was recently diagnosed with a UTI, approximately 10 days ago, and started on

antibiotic.  She finished the antibiotic 3 days ago.  She relates that this pain

that she developed approximately a week ago was very similar to the pain she had

back in November 2019 that resulted in her having the right ovarian cyst 

surgically removed.  She just wants to make sure that she does not have another 

cyst inside of her.)





- History of Present Illness


Timing - onset: How many weeks ago (1)


Timing - details: Constant


Pain level max: 2


Pain level now: 8


Quality: Aching, Dull


Location: RLQ, Suprapubic, LLQ


Associated symptoms: No: Fever, Nausea, Vomiting, Diarrhea, Constipation


Similar symptoms before: Diagnosis (Right ovarian cyst, surgically removed 

February 19, 2020)


Recently seen: Surgery (February 20 19th, 2020; right ovarian cyst removed.  No 

complications since then.)





Review of Systems


Constitutional: reports: Reviewed and negative


Eyes: reports: Reviewed and negative





PD PAST MEDICAL HISTORY





- Past Medical History


Past Medical History: Yes


Cardiovascular: None


Respiratory: None


Neuro: None


Endocrine/Autoimmune: Other


GI: None


GYN: None


: None


HEENT: None


Psych: None


Musculoskeletal: None


Derm: Rosacea





- Past Surgical History


Past Surgical History: Yes


General: Other





- Allergies


Allergies/Adverse Reactions: 


                                    Allergies











Allergy/AdvReac Type Severity Reaction Status Date / Time


 


cephalexin Allergy  Hives Verified 04/22/20 17:35


 


fluconazole Allergy  Respiratory Verified 04/22/20 17:35


 


sulfamethoxazole Allergy  Respiratory Verified 04/22/20 17:35





[From Bactrim]     


 


trimethoprim [From Bactrim] Allergy  Respiratory Verified 04/22/20 17:35


 


hydromorphone [From Dilaudid] AdvReac  Anxiety Verified 04/22/20 17:37


 


oxycodone AdvReac  Unknown Verified 04/22/20 17:37


 


bromopex AdvReac Severe problems Uncoded 04/22/20 17:35





   w/ airway  














- Social History


Does the pt smoke?: No


Smoking Status: Never smoker


Does the pt drink ETOH?: Yes


Does the pt have substance abuse?: No





- Immunizations


Immunizations are current?: Yes





- POLST


Patient has POLST: No





PD ED PE NORMAL





- General


General: Alert and oriented X 3, No acute distress, Well developed/nourished





- HEENT


HEENT: Atraumatic, PERRL, EOMI





- Neck


Neck: No adenopathy





- Cardiac


Cardiac: RRR, No murmur





- Respiratory


Respiratory: No respiratory distress, Clear bilaterally





- Abdomen


Abdomen: Normal bowel sounds, Soft, Other (Mild tender to palp to the right 

lower quadrant, left lower quadrant and suprapubic region.)





- Back


Back: No CVA TTP





- Derm


Derm: Normal color, Warm and dry, No rash





Results





- Vitals


Vitals: 


                               Vital Signs - 24 hr











  04/22/20 04/22/20





  17:29 19:47


 


Temperature 36.5 C 


 


Heart Rate 68 69


 


Respiratory 20 16





Rate  


 


Blood Pressure 144/85 H 134/80 H


 


O2 Saturation 99 100








                                     Oxygen











O2 Source                      Room air

















- Labs


Labs: 


                                Laboratory Tests











  04/22/20 04/22/20 04/22/20





  17:54 19:43 19:43


 


WBC   10.3 


 


RBC   5.09 


 


Hgb   15.8 


 


Hct   47.2 H 


 


MCV   92.7 


 


MCH   31.0 


 


MCHC   33.5 


 


RDW   12.1 


 


Plt Count   221 


 


MPV   9.9 


 


Neut # (Auto)   7.3 H 


 


Lymph # (Auto)   2.3 


 


Mono # (Auto)   0.5 


 


Eos # (Auto)   0.1 


 


Baso # (Auto)   0.0 


 


Absolute Nucleated RBC   0.00 


 


Nucleated RBC %   0.0 


 


Sodium    137


 


Potassium    3.4 L


 


Chloride    101


 


Carbon Dioxide    28


 


Anion Gap    8.0


 


BUN    13


 


Creatinine    0.7


 


Estimated GFR (MDRD)    95


 


Glucose    107 H


 


Calcium    9.4


 


Total Bilirubin    0.6


 


AST    36


 


ALT    36


 


Alkaline Phosphatase    76


 


Total Protein    7.9


 


Albumin    4.6


 


Globulin    3.3


 


Albumin/Globulin Ratio    1.4


 


Lipase    39


 


Urine Color  YELLOW  


 


Urine Clarity  CLEAR  


 


Urine pH  6.5  


 


Ur Specific Gravity  <=1.005  


 


Urine Protein  NEGATIVE  


 


Urine Glucose (UA)  NEGATIVE  


 


Urine Ketones  NEGATIVE  


 


Urine Occult Blood  NEGATIVE  


 


Urine Nitrite  NEGATIVE  


 


Urine Bilirubin  NEGATIVE  


 


Urine Urobilinogen  0.2 (NORMAL)  


 


Ur Leukocyte Esterase  NEGATIVE  


 


Ur Microscopic Review  NOT INDICATED  


 


Urine Culture Comments  NOT INDICATED  














- Rads (name of study)


  ** No standard instances


Radiology: Final report received, See rad report





PD MEDICAL DECISION MAKING





- ED course


Complexity details: reviewed results, re-evaluated patient, d/w patient





Departure





- Departure


Disposition: 01 Home, Self Care


Clinical Impression: 


Abdominal pain


Qualifiers:


 Abdominal location: lower abdomen, unspecified Qualified Code(s): R10.30 - 

Lower abdominal pain, unspecified





Condition: Good


Instructions:  ED Abdominal Pain Cause Unkn Fem Ch


Follow-Up: 


Haleigh Bonner MD [Physician No Access] - 


Comments: 


Your abdominal pain is most likely from your multiple cervical Nabothian cyst 

that were noted on the ultrasound tonight the remainder of your ultrasound was 

normal.. Given your history recently of laparoscopic surgery to remove your 

right ovary due to cyst, and your elevated CA1-125, I recommend that you follow-

up with your OB/GYN tomorrow, personally or with a phone call and let her know 

her new symptoms and what her suggestions are for further treatment.  You can us

e ibuprofen for pain control to help decrease the cyst size. You can try using 

heating pads your lower abdomen to help with the pain.

## 2020-04-22 NOTE — ULTRASOUND REPORT
Reason:  pelvic pain, R

Procedure Date:  04/22/2020   

Accession Number:  [id number] / [id number]                    

Procedure:  US  - Pelvic w/Transvag+Doppler Comp CPT Code:  

 

***Final Report***

 

 

FULL RESULT:

 

 

EXAM:

PELVIC ULTRASOUND WITH DOPPLERS

 

CLINICAL HISTORY: Right pelvic pain. Patient is status post removal of a 

complex cystic and solid right ovarian mass.

 

COMPARISON: PEL NON OB W/TV DOP 01/30/2020 4:58 PM

 

TECHNIQUE: Realtime transabdominal imaging performed to identify the 

uterus and adnexa and as an overview of other pelvic structures, followed 

by transvaginal imaging for better assessment of the endometrium and 

adnexa, with static image documentation.

 

Color flow imaging and Doppler spectral analysis was performed to 

evaluate blood flow to the ovaries given pelvic pain.

 

FINDINGS:

Uterus: 8.7 x 3.3 x 4.6 cm, volume 68 cc. Anteverted position. Normal 

overall size and echotexture.

 

Masses: None.

 

Endometrium: 9 mm. Normal.

 

Cervix: Fluid in the cervical canal. Multiple cervical nabothian cysts, 

physiologic.

 

Right ovary: Surgically absent.

 

Left Ovary: 3.9 x 3.1 x 2.6 cm, volume 16.2 cc. Normal background 

echotexture. Physiologic ovarian follicles Arterial and venous blood flow 

are present, best seen transabdominally. PSV 11.8 cm/sec. RI 0.7.

Adnexa are unremarkable.

 

Free Fluid: None.

 

Other: None.

IMPRESSION:

1. Surgically absent right ovary.

2. Multiple cervical nabothian cysts, with fluid in the cervical canal.

3. Normal endometrial stripe thickness.

4. Normal sonographic evaluation of the left ovary.

5. No sonographic evidence for residual or recurrent right adnexal lesion.

 

[facility]

## 2020-06-22 ENCOUNTER — HOSPITAL ENCOUNTER (OUTPATIENT)
Dept: HOSPITAL 76 - LAB.R | Age: 35
Discharge: HOME | End: 2020-06-22
Attending: OBSTETRICS & GYNECOLOGY
Payer: COMMERCIAL

## 2020-06-22 DIAGNOSIS — R30.0: Primary | ICD-10-CM

## 2020-06-22 PROCEDURE — 87086 URINE CULTURE/COLONY COUNT: CPT

## 2020-10-27 ENCOUNTER — HOSPITAL ENCOUNTER (EMERGENCY)
Dept: HOSPITAL 76 - ED | Age: 35
Discharge: HOME | End: 2020-10-27
Payer: COMMERCIAL

## 2020-10-27 VITALS — DIASTOLIC BLOOD PRESSURE: 70 MMHG | SYSTOLIC BLOOD PRESSURE: 126 MMHG

## 2020-10-27 DIAGNOSIS — R20.2: ICD-10-CM

## 2020-10-27 DIAGNOSIS — Z87.891: ICD-10-CM

## 2020-10-27 DIAGNOSIS — E87.6: Primary | ICD-10-CM

## 2020-10-27 DIAGNOSIS — R20.0: ICD-10-CM

## 2020-10-27 LAB
ALBUMIN DIAFP-MCNC: 4.5 G/DL (ref 3.2–5.5)
ALBUMIN/GLOB SERPL: 1.3 {RATIO} (ref 1–2.2)
ALP SERPL-CCNC: 64 IU/L (ref 42–121)
ALT SERPL W P-5'-P-CCNC: 36 IU/L (ref 10–60)
ANION GAP SERPL CALCULATED.4IONS-SCNC: 9 MMOL/L (ref 6–13)
AST SERPL W P-5'-P-CCNC: 24 IU/L (ref 10–42)
BASOPHILS NFR BLD AUTO: 0.1 10^3/UL (ref 0–0.1)
BASOPHILS NFR BLD AUTO: 0.6 %
BILIRUB BLD-MCNC: 0.4 MG/DL (ref 0.2–1)
BUN SERPL-MCNC: 13 MG/DL (ref 6–20)
CALCIUM UR-MCNC: 9.5 MG/DL (ref 8.5–10.3)
CHLORIDE SERPL-SCNC: 100 MMOL/L (ref 101–111)
CO2 SERPL-SCNC: 28 MMOL/L (ref 21–32)
CREAT SERPLBLD-SCNC: 0.7 MG/DL (ref 0.4–1)
CRP SERPL-MCNC: < 1 MG/DL (ref 0–1)
EOSINOPHIL # BLD AUTO: 0.1 10^3/UL (ref 0–0.7)
EOSINOPHIL NFR BLD AUTO: 1.5 %
ERYTHROCYTE [DISTWIDTH] IN BLOOD BY AUTOMATED COUNT: 12 % (ref 12–15)
GLOBULIN SER-MCNC: 3.5 G/DL (ref 2.1–4.2)
GLUCOSE SERPL-MCNC: 100 MG/DL (ref 70–100)
HGB UR QL STRIP: 15.5 G/DL (ref 12–16)
LIPASE SERPL-CCNC: 31 U/L (ref 22–51)
LYMPHOCYTES # SPEC AUTO: 2.4 10^3/UL (ref 1.5–3.5)
LYMPHOCYTES NFR BLD AUTO: 27.7 %
MAGNESIUM SERPL-MCNC: 2.1 MG/DL (ref 1.7–2.8)
MCH RBC QN AUTO: 30.9 PG (ref 27–31)
MCHC RBC AUTO-ENTMCNC: 33.3 G/DL (ref 32–36)
MCV RBC AUTO: 92.8 FL (ref 81–99)
MONOCYTES # BLD AUTO: 0.4 10^3/UL (ref 0–1)
MONOCYTES NFR BLD AUTO: 4.9 %
NEUTROPHILS # BLD AUTO: 5.7 10^3/UL (ref 1.5–6.6)
NEUTROPHILS # SNV AUTO: 8.7 X10^3/UL (ref 4.8–10.8)
NEUTROPHILS NFR BLD AUTO: 65.1 %
PDW BLD AUTO: 9.9 FL (ref 7.9–10.8)
PLATELET # BLD: 225 10^3/UL (ref 130–450)
PROT SPEC-MCNC: 8 G/DL (ref 6.7–8.2)
RBC MAR: 5.02 10^6/UL (ref 4.2–5.4)
SODIUM SERPLBLD-SCNC: 137 MMOL/L (ref 135–145)

## 2020-10-27 PROCEDURE — 99284 EMERGENCY DEPT VISIT MOD MDM: CPT

## 2020-10-27 PROCEDURE — 99283 EMERGENCY DEPT VISIT LOW MDM: CPT

## 2020-10-27 PROCEDURE — 80053 COMPREHEN METABOLIC PANEL: CPT

## 2020-10-27 PROCEDURE — 85025 COMPLETE CBC W/AUTO DIFF WBC: CPT

## 2020-10-27 PROCEDURE — 86140 C-REACTIVE PROTEIN: CPT

## 2020-10-27 PROCEDURE — 84443 ASSAY THYROID STIM HORMONE: CPT

## 2020-10-27 PROCEDURE — 36415 COLL VENOUS BLD VENIPUNCTURE: CPT

## 2020-10-27 PROCEDURE — 83690 ASSAY OF LIPASE: CPT

## 2020-10-27 PROCEDURE — 83735 ASSAY OF MAGNESIUM: CPT

## 2020-10-27 NOTE — ED PHYSICIAN DOCUMENTATION
PD HPI FOCAL NEURO





- Stated complaint


Stated Complaint: HAND/FEET PX





- Chief complaint


Chief Complaint: General





- History obtained from


History obtained from: Patient





- History of Present Illness


Timing - onset: How many days ago (3)


Timing - duration: Days (3)


Timing - details: Gradual onset (noted feeling of burning and numbness in lower 

legs few days ago which undulated. Then noted tingling in both hands/forearms. 

Seen by PMD video yestereay with labs done outpt. Results pending. Today with 

feeling of tingling right cheek. No focal weakness. Concerned about electrolytes

off.), Still present, Waxing and waning


Severity of deficit: Mild


Weakness: No: Face, Arm, Leg


Numbness: Face, Hand, Foot, Right, Left


Associated symptoms: No: Headache, Nausea / vomiting, Syncope


Baseline status: positive: A&OX3, ambulatory, indep


Similar symptoms before: Has not had sx before


Recently seen: Clinic (yesterday with lab results pending, [facility])





Review of Systems


Constitutional: denies: Fever, Chills


Eyes: denies: Loss of vision, Decreased vision


Nose: denies: Rhinorrhea / runny nose, Congestion


Throat: denies: Sore throat


Cardiac: denies: Chest pain / pressure


Respiratory: denies: Dyspnea, Cough


GI: denies: Abdominal Pain, Nausea, Vomiting


Neurologic: reports: Numbness.  denies: Focal weakness, Difficulty speaking, 

Headache


Endocrine: reports: Weight loss (gradually over the past year with good eating 

and exercise.)





PD PAST MEDICAL HISTORY





- Past Medical History


Cardiovascular: None


Respiratory: None


Neuro: Headaches


Endocrine/Autoimmune: Other (adrenal insufficiency (type 17))


GI: None


GYN: Ovarian cysts


: None


HEENT: None


Psych: Depression, Anxiety


Musculoskeletal: None


Derm: Rosacea





- Past Surgical History


Past Surgical History: Yes


General: Other


/GYN: Oophrectomy





- Present Medications


Home Medications: 


                                Ambulatory Orders











 Medication  Instructions  Recorded  Confirmed


 


Potassium Chloride 10 meq PO DAILY #15 tablet.er 10/27/20 














- Allergies


Allergies/Adverse Reactions: 


                                    Allergies











Allergy/AdvReac Type Severity Reaction Status Date / Time


 


cephalexin Allergy  Hives Verified 04/22/20 17:35


 


codeine Allergy  Hives Verified 10/27/20 18:27


 


fluconazole Allergy  Respiratory Verified 04/22/20 17:35


 


minocycline Allergy  Respiratory Verified 10/27/20 18:28


 


sulfamethoxazole Allergy  Respiratory Verified 04/22/20 17:35





[From Bactrim]     


 


Tetracyclines Allergy  Respiratory Verified 10/27/20 18:28


 


trimethoprim [From Bactrim] Allergy  Respiratory Verified 04/22/20 17:35


 


hydromorphone [From Dilaudid] AdvReac  Anxiety Verified 04/22/20 17:37


 


oxycodone AdvReac  Unknown Verified 04/22/20 17:37


 


bromopex AdvReac Severe problems Uncoded 04/22/20 17:35





   w/ airway  














- Social History


Does the pt smoke?: No


Smoking Status: Former smoker


Does the pt drink ETOH?: Yes


Does the pt have substance abuse?: No





- Immunizations


Immunizations are current?: Yes





- POLST


Patient has POLST: No





PD ED PE NORMAL





- Vitals


Vital signs reviewed: Yes





- General


General: Alert and oriented X 3, No acute distress, Well developed/nourished





- HEENT


HEENT: PERRL, EOMI, Moist mucous membranes





- Neck


Neck: Supple, no meningeal sign, No adenopathy





- Cardiac


Cardiac: RRR, No murmur





- Respiratory


Respiratory: Clear bilaterally





- Abdomen


Abdomen: Soft, Non tender





- Derm


Derm: Normal color, Warm and dry





- Extremities


Extremities: Normal ROM s pain





- Neuro


Neuro: Alert and oriented X 3, CNs 2-12 intact, No motor deficit, No sensory 

deficit, Normal speech, Other


Eye Opening: Spontaneous


Motor: Obeys Commands


Verbal: Oriented


GCS Score: 15





NIHSS





- Level of Consciousness


Level of consciousness: (0) Alert, Keenly responsive


LOC Questions: (0) Answers both Q's correct


LOC Commands: (0) Performs both correctly





- Gaze


Best Gaze: (0) Normal





- Visual


Visual: (0) No loss





- Facial Palsy


Facial Palsy: (0) Normal, symmetrical movement





- Motor Arms (both separate)


Motor Arm (right): (0) No drift


Motor Arm (left): (0) No drift





- Motor Legs (both separate)


Motor Leg (right): (0) No drift


Motor Leg (left): (0) No drift





- Limb Ataxia


Limb Ataxia: (0) Absent





- Sensory


Sensory: (0) Normal





- Best Language


Best Language: (0) No aphasia





- Dysarthria


Dysarthria: (0) Normal





- Extinction and Inattention (formally neg


Extinction and inattention: (0) No abnormality





- Total Score/Results


Total Score/Result: 0





Results





- Vitals


Vitals: 


                               Vital Signs - 24 hr











  10/27/20 10/27/20 10/27/20





  18:20 18:51 19:38


 


Temperature 36.4 C L  


 


Heart Rate 81 81 79


 


Respiratory 16 16 16





Rate   


 


Blood Pressure 130/74 122/83 H 132/76 H


 


O2 Saturation 99 100 100














  10/27/20





  20:48


 


Temperature 37.2 C


 


Heart Rate 78


 


Respiratory 16





Rate 


 


Blood Pressure 126/70


 


O2 Saturation 100








                                     Oxygen











O2 Source                      Room air

















- Labs


Labs: 


                                Laboratory Tests











  10/27/20 10/27/20 10/27/20





  19:45 19:45 19:45


 


WBC  8.7  


 


RBC  5.02  


 


Hgb  15.5  


 


Hct  46.6  


 


MCV  92.8  


 


MCH  30.9  


 


MCHC  33.3  


 


RDW  12.0  


 


Plt Count  225  


 


MPV  9.9  


 


Neut # (Auto)  5.7  


 


Lymph # (Auto)  2.4  


 


Mono # (Auto)  0.4  


 


Eos # (Auto)  0.1  


 


Baso # (Auto)  0.1  


 


Absolute Nucleated RBC  0.00  


 


Nucleated RBC %  0.0  


 


Sodium   137 


 


Potassium   3.4 L 


 


Chloride   100 L 


 


Carbon Dioxide   28 


 


Anion Gap   9.0 


 


BUN   13 


 


Creatinine   0.7 


 


Estimated GFR (MDRD)   94 


 


Glucose   100 


 


Calcium   9.5 


 


Magnesium   2.1 


 


Total Bilirubin   0.4 


 


AST   24 


 


ALT   36 


 


Alkaline Phosphatase   64 


 


C-Reactive Protein   < 1.0 


 


Total Protein   8.0 


 


Albumin   4.5 


 


Globulin   3.5 


 


Albumin/Globulin Ratio   1.3 


 


Lipase   31 


 


TSH    1.91














PD MEDICAL DECISION MAKING





- ED course


Complexity details: reviewed results, considered differential (sounds more 

metabolic than cerebrovascular with undulating tingling in feet and hands now 

face. ), d/w patient





Departure





- Departure


Disposition: 01 Home, Self Care


Clinical Impression: 


 Paresthesia, Hypokalemia





Condition: Stable


Record reviewed to determine appropriate education?: Yes


Instructions:  Hypokalemia Dc, ED Paraesthesias


Follow-Up: 


Gregory Clark MD [Primary Care Provider] - 


Prescriptions: 


Potassium Chloride 10 meq PO DAILY #15 tablet.er


Comments: 


Your potassium level is slightly low and that may account for the numbness fe

eling you are having.  Add a potassium supplement daily for the next couple of 

weeks.  Continue your other usual medicines.  Follow-up with your primary care 

and endocrinologist.


Discharge Date/Time: 10/27/20 20:57

## 2020-10-28 ENCOUNTER — HOSPITAL ENCOUNTER (OUTPATIENT)
Dept: HOSPITAL 76 - EMS | Age: 35
Discharge: ANOTHER HOSPITAL | End: 2020-10-28
Attending: SURGERY
Payer: COMMERCIAL

## 2020-10-28 ENCOUNTER — HOSPITAL ENCOUNTER (EMERGENCY)
Dept: HOSPITAL 76 - ED | Age: 35
Discharge: HOME | End: 2020-10-28
Payer: COMMERCIAL

## 2020-10-28 VITALS — DIASTOLIC BLOOD PRESSURE: 68 MMHG | SYSTOLIC BLOOD PRESSURE: 136 MMHG

## 2020-10-28 DIAGNOSIS — Z87.891: ICD-10-CM

## 2020-10-28 DIAGNOSIS — R07.9: Primary | ICD-10-CM

## 2020-10-28 DIAGNOSIS — M25.512: ICD-10-CM

## 2020-10-28 LAB
ANION GAP SERPL CALCULATED.4IONS-SCNC: 12 MMOL/L (ref 6–13)
BASOPHILS NFR BLD AUTO: 0.1 10^3/UL (ref 0–0.1)
BASOPHILS NFR BLD AUTO: 0.6 %
BUN SERPL-MCNC: 12 MG/DL (ref 6–20)
CALCIUM UR-MCNC: 9.8 MG/DL (ref 8.5–10.3)
CHLORIDE SERPL-SCNC: 103 MMOL/L (ref 101–111)
CO2 SERPL-SCNC: 24 MMOL/L (ref 21–32)
CREAT SERPLBLD-SCNC: 0.7 MG/DL (ref 0.4–1)
EOSINOPHIL # BLD AUTO: 0.1 10^3/UL (ref 0–0.7)
EOSINOPHIL NFR BLD AUTO: 0.7 %
ERYTHROCYTE [DISTWIDTH] IN BLOOD BY AUTOMATED COUNT: 11.9 % (ref 12–15)
GLUCOSE SERPL-MCNC: 92 MG/DL (ref 70–100)
HGB UR QL STRIP: 15.7 G/DL (ref 12–16)
LYMPHOCYTES # SPEC AUTO: 1.4 10^3/UL (ref 1.5–3.5)
LYMPHOCYTES NFR BLD AUTO: 17.1 %
MCH RBC QN AUTO: 31.1 PG (ref 27–31)
MCHC RBC AUTO-ENTMCNC: 33.6 G/DL (ref 32–36)
MCV RBC AUTO: 92.5 FL (ref 81–99)
MONOCYTES # BLD AUTO: 0.5 10^3/UL (ref 0–1)
MONOCYTES NFR BLD AUTO: 5.5 %
NEUTROPHILS # BLD AUTO: 6.4 10^3/UL (ref 1.5–6.6)
NEUTROPHILS # SNV AUTO: 8.4 X10^3/UL (ref 4.8–10.8)
NEUTROPHILS NFR BLD AUTO: 75.7 %
PDW BLD AUTO: 9.7 FL (ref 7.9–10.8)
PLATELET # BLD: 223 10^3/UL (ref 130–450)
RBC MAR: 5.05 10^6/UL (ref 4.2–5.4)
SODIUM SERPLBLD-SCNC: 139 MMOL/L (ref 135–145)

## 2020-10-28 PROCEDURE — 84484 ASSAY OF TROPONIN QUANT: CPT

## 2020-10-28 PROCEDURE — 85379 FIBRIN DEGRADATION QUANT: CPT

## 2020-10-28 PROCEDURE — 36415 COLL VENOUS BLD VENIPUNCTURE: CPT

## 2020-10-28 PROCEDURE — 99284 EMERGENCY DEPT VISIT MOD MDM: CPT

## 2020-10-28 PROCEDURE — 85025 COMPLETE CBC W/AUTO DIFF WBC: CPT

## 2020-10-28 PROCEDURE — 93005 ELECTROCARDIOGRAM TRACING: CPT

## 2020-10-28 PROCEDURE — 71045 X-RAY EXAM CHEST 1 VIEW: CPT

## 2020-10-28 PROCEDURE — 80048 BASIC METABOLIC PNL TOTAL CA: CPT

## 2020-10-28 NOTE — ED PHYSICIAN DOCUMENTATION
PD HPI CHEST PAIN





- Stated complaint


Stated Complaint: CHEST PAIN





- Chief complaint


Chief Complaint: Cardiac





- History obtained from


History obtained from: Patient





- Additional information


Additional information: 





Has had episodic burning central nonradiating chest pain for days. Bad episode 

starting 11am with light activity, last 10 minutes. Now better. No SOA, pedal 

edema or cough.





Review of Systems


Constitutional: denies: Fever, Chills


Cardiac: reports: Chest pain / pressure.  denies: Palpitations, Pedal edema, 

Calf pain


Respiratory: denies: Dyspnea, Cough





PD PAST MEDICAL HISTORY





- Past Medical History


Cardiovascular: None


Respiratory: None


Neuro: Headaches


Endocrine/Autoimmune: Other (adrenal insufficiency (type 17))


GI: None


GYN: Ovarian cysts


: None


HEENT: None


Psych: Depression, Anxiety


Musculoskeletal: None


Derm: Rosacea





- Past Surgical History


Past Surgical History: Yes


General: Other


/GYN: Oophrectomy





- Present Medications


Home Medications: 


                                Ambulatory Orders











 Medication  Instructions  Recorded  Confirmed


 


Potassium Chloride 10 meq PO DAILY #15 tablet.er 10/27/20 


 


Famotidine [Pepcid] 20 mg PO BID #60 tablet 10/28/20 














- Allergies


Allergies/Adverse Reactions: 


                                    Allergies











Allergy/AdvReac Type Severity Reaction Status Date / Time


 


cephalexin Allergy  Hives Verified 10/28/20 12:30


 


codeine Allergy  Hives Verified 10/28/20 12:30


 


fluconazole Allergy  Respiratory Verified 10/28/20 12:30


 


minocycline Allergy  Respiratory Verified 10/28/20 12:30


 


sulfamethoxazole Allergy  Respiratory Verified 10/28/20 12:30





[From Bactrim]     


 


Tetracyclines Allergy  Respiratory Verified 10/28/20 12:30


 


trimethoprim [From Bactrim] Allergy  Respiratory Verified 10/28/20 12:30


 


hydromorphone [From Dilaudid] AdvReac  Anxiety Verified 10/28/20 12:30


 


oxycodone AdvReac  Unknown Verified 10/28/20 12:30


 


bromopex AdvReac Severe problems Uncoded 04/22/20 17:35





   w/ airway  














- Social History


Does the pt smoke?: No


Smoking Status: Former smoker


Does the pt drink ETOH?: Yes


Does the pt have substance abuse?: No





- Immunizations


Immunizations are current?: Yes





- POLST


Patient has POLST: No





PD ED PE NORMAL





- Vitals


Vital signs reviewed: Yes





- General


General: Alert and oriented X 3, No acute distress





- Neck


Neck: Supple, no meningeal sign, No bony TTP





- Cardiac


Cardiac: RRR, No murmur





- Respiratory


Respiratory: No respiratory distress, Clear bilaterally





- Extremities


Extremities: No edema, No calf tenderness / cord, Other (good / equal 

pedal/radial pulses)





- Neuro


Neuro: Alert and oriented X 3, Normal speech





- Psych


Psych: Normal mood, Normal affect





Results





- Vitals


Vitals: 


                               Vital Signs - 24 hr











  10/28/20 10/28/20





  12:30 12:33


 


Temperature 37.3 C 


 


Heart Rate 79 76


 


Respiratory 18 25 H





Rate  


 


Blood Pressure 137/82 H 115/76


 


O2 Saturation 100 95








                                     Oxygen











O2 Source                      Room air

















- EKG (time done)


  ** 1229


Rate: Rate (enter#) (74)


Rhythm: NSR


Axis: Normal


Intervals: Normal AR


QRS: Normal


Ischemia: Normal ST segments


Computer interpretation: Agree with computer





- Labs


Labs: 


                                Laboratory Tests











  10/28/20 10/28/20 10/28/20





  12:55 12:55 12:55


 


WBC  8.4  


 


RBC  5.05  


 


Hgb  15.7  


 


Hct  46.7  


 


MCV  92.5  


 


MCH  31.1 H  


 


MCHC  33.6  


 


RDW  11.9 L  


 


Plt Count  223  


 


MPV  9.7  


 


Neut # (Auto)  6.4  


 


Lymph # (Auto)  1.4 L  


 


Mono # (Auto)  0.5  


 


Eos # (Auto)  0.1  


 


Baso # (Auto)  0.1  


 


Absolute Nucleated RBC  0.00  


 


Nucleated RBC %  0.0  


 


D-Dimer   200.6 


 


Sodium    139


 


Potassium    3.9


 


Chloride    103


 


Carbon Dioxide    24


 


Anion Gap    12.0


 


BUN    12


 


Creatinine    0.7


 


Estimated GFR (MDRD)    94


 


Glucose    92


 


Calcium    9.8


 


Troponin I High Sens   














  10/28/20





  12:55


 


WBC 


 


RBC 


 


Hgb 


 


Hct 


 


MCV 


 


MCH 


 


MCHC 


 


RDW 


 


Plt Count 


 


MPV 


 


Neut # (Auto) 


 


Lymph # (Auto) 


 


Mono # (Auto) 


 


Eos # (Auto) 


 


Baso # (Auto) 


 


Absolute Nucleated RBC 


 


Nucleated RBC % 


 


D-Dimer 


 


Sodium 


 


Potassium 


 


Chloride 


 


Carbon Dioxide 


 


Anion Gap 


 


BUN 


 


Creatinine 


 


Estimated GFR (MDRD) 


 


Glucose 


 


Calcium 


 


Troponin I High Sens  < 2.3 L














PD MEDICAL DECISION MAKING





- ED course


ED course: 





Heart 1, hx smoking


D D-yani and CXR neg





Likely GI given burning episodic hx, will trial pepcid.





Departure





- Departure


Disposition: 01 Home, Self Care


Clinical Impression: 


Chest pain


Qualifiers:


 Chest pain type: unspecified Qualified Code(s): R07.9 - Chest pain, unspecified





Condition: Good


Record reviewed to determine appropriate education?: Yes


Instructions:  ED Chest Pain NonCardiac


Prescriptions: 


Famotidine [Pepcid] 20 mg PO BID #60 tablet


Comments: 


As discussed likely GI source given the history. Return if worse. Followup with 

your PCP.

## 2020-10-28 NOTE — XRAY REPORT
PROCEDURE:  Chest 1 View X-Ray

 

INDICATIONS:  cp

 

TECHNIQUE:  One view of the chest was acquired.  

 

 

FINDINGS:  

 

Surgical changes and devices:  None.  

 

Lungs and pleura:  No pleural effusions or pneumothorax.  Lungs are clear.  

 

Mediastinum:  Mediastinal contours appear normal.  Heart size is normal.  

 

Bones and chest wall:  No suspicious bony lesions.  Overlying soft tissues appear unremarkable.  

 

IMPRESSION:  

Normal for age, source of current symptoms is not seen.

 

Reviewed by: Osito Jensen MD on 10/28/2020 1:33 PM PDT

Approved by: Osito Jensen MD on 10/28/2020 1:33 PM PDT

 

 

Station ID:  [id number]

## 2020-11-11 ENCOUNTER — HOSPITAL ENCOUNTER (OUTPATIENT)
Dept: HOSPITAL 76 - DI | Age: 35
Discharge: HOME | End: 2020-11-11
Attending: STUDENT IN AN ORGANIZED HEALTH CARE EDUCATION/TRAINING PROGRAM
Payer: COMMERCIAL

## 2020-11-11 DIAGNOSIS — H53.9: ICD-10-CM

## 2020-11-11 DIAGNOSIS — G62.9: Primary | ICD-10-CM

## 2020-11-11 DIAGNOSIS — G83.10: ICD-10-CM

## 2020-11-11 DIAGNOSIS — G51.9: ICD-10-CM

## 2020-11-11 PROCEDURE — 70553 MRI BRAIN STEM W/O & W/DYE: CPT

## 2020-11-11 NOTE — MRI REPORT
PROCEDURE:  Brain W/WO

 

INDICATIONS:  POLYNEUROPATHY, BLE WEAKNESS, VISION DISTURBANCE

 

CONTRAST:  IV CONTRAST: Gadavist ml: 7  

 

TECHNIQUE:  

Noncontrast axial T1 spin echo, axial T2 fast spin echo, sagittal and axial FLAIR, coronal T2 fast sp
in echo, axial gradient echo, axial diffusion and ADC through the brain.  After the administration of
 contrast, axial and coronal T1 spin echo with fat saturation through the brain.  

 

COMPARISON:  None.

 

FINDINGS:  

Image quality:  Excellent.  

 

CSF spaces:  Basal cisterns are patent.  No extra-axial fluid collections.  Ventricles are normal in 
size and shape.  

 

Brain:  No midline shift.  No intracranial bleeds or masses.  No abnormal intracranial enhancement.  
There is cerebral volume loss for age.  There is periventricular white matter chronic small vessel is
chemic change.  The brainstem appears normal.  Diffusion-weighted images demonstrate no acute ischemi
c insults.  No chronic ischemic insults.  Normal intravascular flow voids are present.  

 

Skull and face:  Calvarial marrow is normal in signal.  Orbits appear normal.  

 

Sinuses:  Sinuses and mastoids appear clear.  

 

IMPRESSION:  Negative brain MRI. No explanation for visual disturbance nor weakness. No recent infarc
t.

 

Reviewed by: Rodrigo Fontenot MD on 11/11/2020 3:00 PM PST

Approved by: Rodrigo Fontenot MD on 11/11/2020 3:00 PM PST

 

 

Station ID:  [id number]

## 2020-12-23 ENCOUNTER — HOSPITAL ENCOUNTER (EMERGENCY)
Dept: HOSPITAL 76 - ED | Age: 35
Discharge: HOME | End: 2020-12-23
Payer: COMMERCIAL

## 2020-12-23 VITALS — DIASTOLIC BLOOD PRESSURE: 81 MMHG | SYSTOLIC BLOOD PRESSURE: 137 MMHG

## 2020-12-23 DIAGNOSIS — N83.02: ICD-10-CM

## 2020-12-23 DIAGNOSIS — R10.2: Primary | ICD-10-CM

## 2020-12-23 LAB
ALBUMIN DIAFP-MCNC: 4.4 G/DL (ref 3.2–5.5)
ALBUMIN/GLOB SERPL: 1.3 {RATIO} (ref 1–2.2)
ALP SERPL-CCNC: 57 IU/L (ref 42–121)
ALT SERPL W P-5'-P-CCNC: 22 IU/L (ref 10–60)
ANION GAP SERPL CALCULATED.4IONS-SCNC: 10 MMOL/L (ref 6–13)
AST SERPL W P-5'-P-CCNC: 21 IU/L (ref 10–42)
BASOPHILS NFR BLD AUTO: 0 10^3/UL (ref 0–0.1)
BASOPHILS NFR BLD AUTO: 0.5 %
BILIRUB BLD-MCNC: 0.5 MG/DL (ref 0.2–1)
BUN SERPL-MCNC: 14 MG/DL (ref 6–20)
CALCIUM UR-MCNC: 9.5 MG/DL (ref 8.5–10.3)
CHLORIDE SERPL-SCNC: 100 MMOL/L (ref 101–111)
CLARITY UR REFRACT.AUTO: CLEAR
CO2 SERPL-SCNC: 27 MMOL/L (ref 21–32)
CREAT SERPLBLD-SCNC: 0.6 MG/DL (ref 0.4–1)
EOSINOPHIL # BLD AUTO: 0.1 10^3/UL (ref 0–0.7)
EOSINOPHIL NFR BLD AUTO: 1.3 %
ERYTHROCYTE [DISTWIDTH] IN BLOOD BY AUTOMATED COUNT: 11.9 % (ref 12–15)
GLOBULIN SER-MCNC: 3.5 G/DL (ref 2.1–4.2)
GLUCOSE SERPL-MCNC: 123 MG/DL (ref 70–100)
GLUCOSE UR QL STRIP.AUTO: NEGATIVE MG/DL
HCG UR QL: NEGATIVE
HGB UR QL STRIP: 15 G/DL (ref 12–16)
KETONES UR QL STRIP.AUTO: NEGATIVE MG/DL
LIPASE SERPL-CCNC: 37 U/L (ref 22–51)
LYMPHOCYTES # SPEC AUTO: 2.3 10^3/UL (ref 1.5–3.5)
LYMPHOCYTES NFR BLD AUTO: 27.7 %
MCH RBC QN AUTO: 31.2 PG (ref 27–31)
MCHC RBC AUTO-ENTMCNC: 33.5 G/DL (ref 32–36)
MCV RBC AUTO: 93.1 FL (ref 81–99)
MONOCYTES # BLD AUTO: 0.6 10^3/UL (ref 0–1)
MONOCYTES NFR BLD AUTO: 6.5 %
NEUTROPHILS # BLD AUTO: 5.4 10^3/UL (ref 1.5–6.6)
NEUTROPHILS # SNV AUTO: 8.4 X10^3/UL (ref 4.8–10.8)
NEUTROPHILS NFR BLD AUTO: 63.8 %
NITRITE UR QL STRIP.AUTO: NEGATIVE
PDW BLD AUTO: 9.9 FL (ref 7.9–10.8)
PH UR STRIP.AUTO: 6 PH (ref 5–7.5)
PLATELET # BLD: 228 10^3/UL (ref 130–450)
PROT SPEC-MCNC: 7.9 G/DL (ref 6.7–8.2)
PROT UR STRIP.AUTO-MCNC: NEGATIVE MG/DL
RBC # UR STRIP.AUTO: NEGATIVE /UL
RBC MAR: 4.81 10^6/UL (ref 4.2–5.4)
SODIUM SERPLBLD-SCNC: 137 MMOL/L (ref 135–145)
SP GR UR STRIP.AUTO: 1.02 (ref 1–1.03)
UROBILINOGEN UR QL STRIP.AUTO: (no result) E.U./DL
UROBILINOGEN UR STRIP.AUTO-MCNC: NEGATIVE MG/DL

## 2020-12-23 PROCEDURE — 87086 URINE CULTURE/COLONY COUNT: CPT

## 2020-12-23 PROCEDURE — 81001 URINALYSIS AUTO W/SCOPE: CPT

## 2020-12-23 PROCEDURE — 36415 COLL VENOUS BLD VENIPUNCTURE: CPT

## 2020-12-23 PROCEDURE — 81003 URINALYSIS AUTO W/O SCOPE: CPT

## 2020-12-23 PROCEDURE — 85025 COMPLETE CBC W/AUTO DIFF WBC: CPT

## 2020-12-23 PROCEDURE — 80053 COMPREHEN METABOLIC PANEL: CPT

## 2020-12-23 PROCEDURE — 74177 CT ABD & PELVIS W/CONTRAST: CPT

## 2020-12-23 PROCEDURE — 96374 THER/PROPH/DIAG INJ IV PUSH: CPT

## 2020-12-23 PROCEDURE — 81025 URINE PREGNANCY TEST: CPT

## 2020-12-23 PROCEDURE — 83690 ASSAY OF LIPASE: CPT

## 2020-12-23 PROCEDURE — 99284 EMERGENCY DEPT VISIT MOD MDM: CPT

## 2020-12-23 NOTE — CT REPORT
PROCEDURE:  Abdomen/Pelvis W

 

INDICATIONS:  lower pelvic pain.  hx of right Oophorectomy

 

CONTRAST:  IV CONTRAST: Optiray 320 ml: 100 PO CONTRAST: *NO PO CONTRAST 

 

TECHNIQUE:  

After the administration of nonionic IV contrast, 5 mm thick sections acquired from the diaphragms to
 the symphysis.  5 mm thick coronal and sagittal reformats were acquired.  For radiation dose reducti
on, the following was used:  automated exposure control, adjustment of mA and/or kV according to richard
ent size.  

 

COMPARISON:  11/17/2019

 

FINDINGS:  

Image quality:  Excellent.  

 

ABDOMEN:  

Lung bases:  Lung bases are clear.  Heart size is normal.  

 

Solid organs:  Liver and spleen are normal in size and enhancement.  Gallbladder wall does not appear
 thickened.    Biliary system is non dilated.  Pancreas enhances normally.  No adrenal nodules.  Kidn
eys demonstrate normal size and enhancement, without hydronephrosis.  

 

Peritoneum and bowel:  Bowel loops demonstrate normal wall thickness and caliber.  No free fluid or a
ir.  A normal appendix is seen.

 

Nodes and vessels:  No retroperitoneal or mesenteric adenopathy by size criteria.  Aorta and inferior
 vena cava are normal in size.  

 

Miscellaneous:  No ventral hernias.  

 

 

PELVIS:  

Genitourinary:  Bladder wall thickness is normal.  A 2.2 cm left pelvic cyst is seen, which is consid
ered to be within physiologic limits. No right pelvis abnormality is seen. The uterus is unremarkable
.

 

Miscellaneous:  No inguinal hernias or adenopathy.  

 

Bones:  No suspicious bony lesions.  No vertebral body compression fractures.  

 

 

 

 

 

IMPRESSION:  

 

No right pelvis abnormality is seen.

 

Normal appendix.

 

No hydronephrosis is seen on either side.

 

2.2 cm left ovarian cyst noted, which is considered to be within physiologic limits. No specific imag
ing follow-up is recommended.

 

Reviewed by: Drew Shrestha MD on 12/23/2020 5:34 PM [facility]

Approved by: Drew Shrestha MD on 12/23/2020 5:34 PM [facility]

 

 

Station ID:  [id number]

## 2020-12-23 NOTE — ED PHYSICIAN DOCUMENTATION
History of Present Illness





- Stated complaint


Stated Complaint: ABD SWELLING





- Chief complaint


Chief Complaint: Abd Pain





- Additonal information


Additional information: 


35-year-old female presents to the emergency department for evaluation of lower 

pelvic pain sensation of bloating.  She denies any fevers or vomiting.  She 

states that the symptoms began about 1 week ago when she began to have some 

dysuria and pressure when she would urinate.  She went to her primary care 

provider and was told that she had no signs of urinary tract infection.  A 

culture confirmed that.  Since then she has had persistence of symptoms.





She does report that she has been evaluated in the past for late onset 

congenital adrenal hyperplasia and is followed by an endocrinologist in [place].

 She is not currently medicated for this as she was told that the symptoms were 

mild and did not require supplementation.  She also had a right oophorectomy in 

February 2020 secondary to a very large ovarian cyst.  She states that she is 

also been evaluated in the past for possible PCOS or endometriosis as well as 

Interstitial cystitis..





Over the last week she has had no diarrhea uncontrolled vomiting or fevers.








Review of Systems


Constitutional: denies: Fever


Eyes: reports: Reviewed and negative


Ears: reports: Loss of hearing


Nose: reports: Reviewed and negative


Throat: reports: Reviewed and negative


Cardiac: denies: Chest pain / pressure, Palpitations


Respiratory: denies: Dyspnea, Cough


GI: reports: Abdominal Pain.  denies: Nausea, Vomiting, Constipation, Diarrhea, 

Hematemesis


: reports: Dysuria.  denies: Frequency, Hesitancy, Hematuria


Skin: denies: Rash, Lesions


Musculoskeletal: reports: Reviewed and negative


Neurologic: reports: Reviewed and negative





PD PAST MEDICAL HISTORY





- Past Medical History


Cardiovascular: None


Respiratory: None


Neuro: Headaches


Endocrine/Autoimmune: Other


GI: None


GYN: Ovarian cysts


: None


HEENT: None


Psych: Depression, Anxiety


Musculoskeletal: None


Derm: Rosacea





- Past Surgical History


Past Surgical History: Yes


General: Other


/GYN: Oophrectomy





- Present Medications


Home Medications: 


                                Ambulatory Orders











 Medication  Instructions  Recorded  Confirmed


 


Famotidine [Pepcid] 20 mg PO BID #60 tablet 10/28/20 


 


Ibuprofen [Motrin] 600 mg PO Q6H PRN #30 tab 12/23/20 


 


Pimecrolimus [Elidel] 30 gm TP 12/23/20 














- Allergies


Allergies/Adverse Reactions: 


                                    Allergies











Allergy/AdvReac Type Severity Reaction Status Date / Time


 


cephalexin Allergy  Hives Verified 12/23/20 16:06


 


codeine Allergy  Hives Verified 12/23/20 16:06


 


fluconazole Allergy  Respiratory Verified 12/23/20 16:06


 


minocycline Allergy  Respiratory Verified 12/23/20 16:06


 


sulfamethoxazole Allergy  Respiratory Verified 12/23/20 16:06





[From Bactrim]     


 


Tetracyclines Allergy  Respiratory Verified 12/23/20 16:06


 


trimethoprim [From Bactrim] Allergy  Respiratory Verified 12/23/20 16:06


 


hydromorphone [From Dilaudid] AdvReac  Anxiety Verified 12/23/20 16:06


 


oxycodone AdvReac  Unknown Verified 12/23/20 16:06


 


bromopex AdvReac Severe problems Uncoded 04/22/20 17:35





   w/ airway  














- Social History


Does the pt smoke?: No


Smoking Status: Never smoker


Does the pt drink ETOH?: Yes


Does the pt have substance abuse?: No





- Immunizations


Immunizations are current?: Yes





- POLST


Patient has POLST: No





PD ED PE EXPANDED





- General


General: Alert, No acute distress, Well developed/nourished





- Neck


Neck: Supple w/out meningeal sx.  No: Adenopathy





- Cardiac


Cardiac: Regular Rate, Regular Rhythm, Radial strong equal, Pedal strong equal, 

Cap refill < 2 sec.  No: Murmur Present





- Respiratory


Respiratory: Clear to ausultation david.  No: Distress, Labored





- Abdomen


Abdomen: Normal Bowel sounds, Suprapubic.  No: Tender to palpation (Mild 

tenderness across the lower pelvic region without rebound or guarding.  No CVA 

or flank pain tenderness.)





- Derm


Derm: Normal color.  No: Rash





Results





- Vitals


Vitals: 


                               Vital Signs - 24 hr











  12/23/20 12/23/20





  16:06 17:15


 


Temperature 36.9 C 


 


Heart Rate 83 74


 


Respiratory 18 18





Rate  


 


Blood Pressure 122/73 125/70


 


O2 Saturation 99 99








                                     Oxygen











O2 Source                      Room air

















- Labs


Labs: 


                                Laboratory Tests











  12/23/20 12/23/20 12/23/20





  16:23 16:23 16:40


 


WBC  8.4  


 


RBC  4.81  


 


Hgb  15.0  


 


Hct  44.8  


 


MCV  93.1  


 


MCH  31.2 H  


 


MCHC  33.5  


 


RDW  11.9 L  


 


Plt Count  228  


 


MPV  9.9  


 


Neut # (Auto)  5.4  


 


Lymph # (Auto)  2.3  


 


Mono # (Auto)  0.6  


 


Eos # (Auto)  0.1  


 


Baso # (Auto)  0.0  


 


Absolute Nucleated RBC  0.00  


 


Nucleated RBC %  0.0  


 


Sodium   137 


 


Potassium   3.8 


 


Chloride   100 L 


 


Carbon Dioxide   27 


 


Anion Gap   10.0 


 


BUN   14 


 


Creatinine   0.6 


 


Estimated GFR (MDRD)   112 


 


Glucose   123 H 


 


Calcium   9.5 


 


Total Bilirubin   0.5 


 


AST   21 


 


ALT   22 


 


Alkaline Phosphatase   57 


 


Total Protein   7.9 


 


Albumin   4.4 


 


Globulin   3.5 


 


Albumin/Globulin Ratio   1.3 


 


Lipase   37 


 


Urine Color    YELLOW


 


Urine Clarity    CLEAR


 


Urine pH    6.0


 


Ur Specific Gravity    1.025


 


Urine Protein    NEGATIVE


 


Urine Glucose (UA)    NEGATIVE


 


Urine Ketones    NEGATIVE


 


Urine Occult Blood    NEGATIVE


 


Urine Nitrite    NEGATIVE


 


Urine Bilirubin    NEGATIVE


 


Urine Urobilinogen    0.2 (NORMAL)


 


Ur Leukocyte Esterase    NEGATIVE


 


Ur Microscopic Review    NOT INDICATED


 


Urine Culture Comments    NOT INDICATED


 


Urine HCG, Qual    NEGATIVE














- Rads (name of study)


  ** CT abd


Radiology: Final report received (No right pelvis abnormality is seen.  Normal 

appendix.  No hydronephrosis is seen on either side.  2.2 cm left ovarian cyst 

noted which is considered to be within physiologic limits. )





PD MEDICAL DECISION MAKING





- ED course


Complexity details: reviewed results, re-evaluated patient, considered 

differential, d/w patient


ED course: 


35-year-old female presents emergency department with 1 week of pressure with 

urination and lower pelvic pain.  She does have a history of right ovarian cyst 

status post oophorectomy in February of this year.  She is also been evaluated 

for suspicion of possible endometriosis, interstitial cystitis.  She does report

being seen by an endocrinologist in [place] for adrenal hyperplasia.





Her labs today are unremarkable.  No leukocytosis unremarkable LFTs and renal 

function.  Her urine shows no signs of infection.  A CT of the abdomen did not 

show any worrisome findings.  Normal appendix.  She does have a physiologic left

ovarian cyst.  These findings were discussed with the patient.  I have 

encouraged her to continue follow-up with her OB/GYN.  This etiology of her 

symptoms today is not clear though it may be related to endometriosis or even 

interstitial cystitis.








Departure





- Departure


Disposition: 01 Home, Self Care


Clinical Impression: 


 Lower abdominal pain





Condition: Stable


Record reviewed to determine appropriate education?: Yes


Instructions:  ED Abdominal Pain Cause Unkn Fem Ch


Prescriptions: 


Ibuprofen [Motrin] 600 mg PO Q6H PRN #30 tab


 PRN Reason: Pain


Comments: 


As we discussed today your labs are unremarkable.  The CT of your abdomen did 

not show any worrisome findings.  You do have an incidental finding of a 2 cm 

left ovarian cyst.  However it does not require any further follow-up.





The source of your abdominal pain is not clear.  It may be related to 

endometriosis or interstitial cystitis.  I would recommend that you continue to 

follow-up with your primary care doctor and the specialists as you are already 

established with.





I do recommend that you take ibuprofen with food 2-3 times per day for 

discomfort.





Return to the emergency department if you develop fevers, have uncontrolled 

vomiting suddenly severe or different abdominal pain, black or bloody stools.

## 2021-02-04 ENCOUNTER — HOSPITAL ENCOUNTER (OUTPATIENT)
Dept: HOSPITAL 76 - LAB | Age: 36
Discharge: HOME | End: 2021-02-04
Attending: OBSTETRICS & GYNECOLOGY
Payer: COMMERCIAL

## 2021-02-04 DIAGNOSIS — R97.1: ICD-10-CM

## 2021-02-04 DIAGNOSIS — R39.82: Primary | ICD-10-CM

## 2021-02-04 PROCEDURE — 87086 URINE CULTURE/COLONY COUNT: CPT

## 2021-02-23 ENCOUNTER — HOSPITAL ENCOUNTER (OUTPATIENT)
Dept: HOSPITAL 76 - LAB.WCP | Age: 36
Discharge: HOME | End: 2021-02-23
Attending: OBSTETRICS & GYNECOLOGY
Payer: COMMERCIAL

## 2021-02-23 DIAGNOSIS — R39.82: Primary | ICD-10-CM

## 2021-02-23 PROCEDURE — 87086 URINE CULTURE/COLONY COUNT: CPT

## 2021-03-06 ENCOUNTER — HOSPITAL ENCOUNTER (OUTPATIENT)
Dept: HOSPITAL 76 - DI | Age: 36
Discharge: HOME | End: 2021-03-06
Attending: OBSTETRICS & GYNECOLOGY
Payer: COMMERCIAL

## 2021-03-06 DIAGNOSIS — E25.0: ICD-10-CM

## 2021-03-06 DIAGNOSIS — R39.82: ICD-10-CM

## 2021-03-06 DIAGNOSIS — R10.9: ICD-10-CM

## 2021-03-06 DIAGNOSIS — R19.00: Primary | ICD-10-CM

## 2021-03-06 NOTE — ULTRASOUND REPORT
PROCEDURE:  Pelvic w/Transvaginal

 

INDICATIONS:  PELVIC MASS, CHRONIC BLADDER PAIN

 

TECHNIQUE:  

Real-time scanning was performed of the pelvic organs, with image documentation.  Additional endovagi
nal scanning was necessary due to incomplete visualization of the adnexal and endometrial structures 
by transabdominal scanning.  

 

COMPARISON:  CT abdomen and pelvis with contrast dated 12/23/2020.

 

FINDINGS:  

No pathologic free abdominal or pelvic fluid.  

 

Uterus:  Uterus is normal in size at 9.6 x 3.3 x 3.8 cm.  The endometrium measures 6 mm in combined t
hickness.  

 

Ovaries:  Right ovary is surgically absent. Left ovary measures 2.3 x 3.2 x 2.9 cm. A previous 2.2 cm
 physiologic cyst has resolved in the left ovary.

 

IMPRESSION:  

Unremarkable pelvic ultrasound. Interval resolution of small left adnexal cyst.

 

Reviewed by: Barron Bhandari MD on 3/6/2021 4:46 PM [facility]

Approved by: Barron Bhandari MD on 3/6/2021 4:46 PM [facility]

 

 

Station ID:  [id number]

## 2021-03-09 ENCOUNTER — HOSPITAL ENCOUNTER (OUTPATIENT)
Dept: HOSPITAL 76 - DI | Age: 36
Discharge: HOME | End: 2021-03-09
Payer: COMMERCIAL

## 2021-03-09 ENCOUNTER — HOSPITAL ENCOUNTER (OUTPATIENT)
Dept: HOSPITAL 76 - LAB.R | Age: 36
Discharge: HOME | End: 2021-03-09
Attending: OBSTETRICS & GYNECOLOGY
Payer: COMMERCIAL

## 2021-03-09 DIAGNOSIS — R20.2: Primary | ICD-10-CM

## 2021-03-09 DIAGNOSIS — R39.82: Primary | ICD-10-CM

## 2021-03-09 DIAGNOSIS — M47.814: ICD-10-CM

## 2021-03-09 DIAGNOSIS — R39.82: ICD-10-CM

## 2021-03-09 DIAGNOSIS — M48.02: ICD-10-CM

## 2021-03-09 DIAGNOSIS — M48.04: ICD-10-CM

## 2021-03-09 DIAGNOSIS — M50.31: ICD-10-CM

## 2021-03-09 PROCEDURE — 87086 URINE CULTURE/COLONY COUNT: CPT

## 2021-03-09 PROCEDURE — 72157 MRI CHEST SPINE W/O & W/DYE: CPT

## 2021-03-09 PROCEDURE — 72156 MRI NECK SPINE W/O & W/DYE: CPT

## 2021-03-09 NOTE — MRI REPORT
PROCEDURE:  Thoracic Spine W/WO

 

INDICATIONS:  DISTURBANCES OF SKIN SENSATION,PARESTHESIA OF SKIN

 

CONTRAST:  IV CONTRAST: Gadavist ml: 6.5  

 

TECHNIQUE:  

Noncontrast sagittal T1 spin echo and T2 fast spin echo, sagittal STIR, axial T1 and T2 fast spin ech
o through the thoracic spine.  After the administration of contrast, axial and sagittal T1 spin echo 
with fat saturation through the thoracic spine.  

 

COMPARISON:  None.

 

FINDINGS:  

Image quality:  Excellent.  

 

Alignment and curvature:  There is normal bony alignment.  

 

Marrow:  Marrow is of normal overall signal.  No acute vertebral body compression fractures. 

 

Spinal cord:  Visualized spinal cord is of normal signal and size, without abnormal enhancement.  

 

Paraspinous soft tissues:  No paravertebral masses or abnormal enhancement.  

 

Miscellaneous: There is moderate left and mild right facet hypertrophy at T10-T11, which causes mild 
to moderate canal stenosis with minimal cord flattening. Central canal and foramina otherwise appear 
widely patent at all scanned levels.  

 

IMPRESSION:  

1. No cord signal abnormality.

2. T10-T11 facet osteoarthritis, causing moderate canal stenosis with minimal cord flattening.

 

Reviewed by: Rodrigo Fontenot MD on 3/9/2021 2:34 PM PST

Approved by: Rodrigo Fontenot MD on 3/9/2021 2:34 PM PST

 

 

Station ID:  [id number]

## 2021-03-09 NOTE — MRI REPORT
PROCEDURE:  Cervical Spine W/WO

 

INDICATIONS:  DISURBANCES OF SKIN SENSATION,PARESTHESIA OF SKIN

 

CONTRAST:  IV CONTRAST: Gadavist ml: 6.5  

 

TECHNIQUE:  

Noncontrast sagittal T1 spin echo and T2 fast spin echo, sagittal STIR, sagittal PD fast spin echo, a
xial gradient echo or T2 fast spin echo through the cervical spine.  After the administration of cont
rast, sagittal and axial T1 spin echo with fat saturation through the cervical spine.  

 

COMPARISON:  None.

 

FINDINGS:  

Image quality:  Excellent.  

 

Alignment and curvature:  There is loss of normal cervical lordosis. Mild grade 1 retrolisthesis of C
3 on C4.

 

Marrow:  Marrow demonstrates normal overall signal.  Mild reactive signal within the endplates adjace
nt to the C2-C3, C3-C4, C4-C5, and C5-C6 intervertebral discs.

 

Spinal cord:  Visualized spinal cord is normal in size, without white matter lesions.  No suspicious 
intramedullary enhancement.  No cerebellar tonsillar herniation.  

 

Paraspinous soft tissues:  No paravertebral masses or suspicious enhancement.  

 

C2-C3:  Normal in appearance.  

 

C3-C4:   Mild disc desiccation and diffuse disc bulge. Mild canal stenosis. Mild bilateral foraminal 
stenosis.

 

C4-C5:  Normal in appearance.

 

C5-C6:  Mild disc desiccation and diffuse disc bulge. Mild canal stenosis. Mild bilateral foraminal s
tenosis.

 

C6-C7:  Mild disc desiccation and diffuse disc bulge. Mild canal stenosis. No foraminal stenosis.

 

C7-T1:  Normal in appearance.

  

 

IMPRESSION:  

1. No explanation for paresthesias.

2. Multilevel degenerative disc disease, causing mild canal and foraminal stenoses. No neural impinge
ment.

 

Reviewed by: Rodrigo Fontenot MD on 3/9/2021 2:28 PM PST

Approved by: Rodrigo Fontenot MD on 3/9/2021 2:28 PM PST

 

 

Station ID:  [id number]

## 2021-04-08 ENCOUNTER — HOSPITAL ENCOUNTER (EMERGENCY)
Dept: HOSPITAL 76 - ED | Age: 36
Discharge: HOME | End: 2021-04-08
Payer: COMMERCIAL

## 2021-04-08 VITALS — SYSTOLIC BLOOD PRESSURE: 105 MMHG | DIASTOLIC BLOOD PRESSURE: 68 MMHG

## 2021-04-08 DIAGNOSIS — M79.662: Primary | ICD-10-CM

## 2021-04-08 DIAGNOSIS — R10.2: ICD-10-CM

## 2021-04-08 LAB
CLARITY UR REFRACT.AUTO: CLEAR
GLUCOSE UR QL STRIP.AUTO: NEGATIVE MG/DL
HCG UR QL: NEGATIVE
KETONES UR QL STRIP.AUTO: NEGATIVE MG/DL
NITRITE UR QL STRIP.AUTO: NEGATIVE
PH UR STRIP.AUTO: 6.5 PH (ref 5–7.5)
PROT UR STRIP.AUTO-MCNC: NEGATIVE MG/DL
RBC # UR STRIP.AUTO: NEGATIVE /UL
SP GR UR STRIP.AUTO: 1.01 (ref 1–1.03)
UROBILINOGEN UR QL STRIP.AUTO: (no result) E.U./DL
UROBILINOGEN UR STRIP.AUTO-MCNC: NEGATIVE MG/DL

## 2021-04-08 PROCEDURE — 81001 URINALYSIS AUTO W/SCOPE: CPT

## 2021-04-08 PROCEDURE — 81003 URINALYSIS AUTO W/O SCOPE: CPT

## 2021-04-08 PROCEDURE — 81025 URINE PREGNANCY TEST: CPT

## 2021-04-08 PROCEDURE — 99284 EMERGENCY DEPT VISIT MOD MDM: CPT

## 2021-04-08 PROCEDURE — 87086 URINE CULTURE/COLONY COUNT: CPT

## 2021-04-08 NOTE — EXTERNAL MEDICAL SUMMARY RPT
Continuity of Care Document

                            Created on:2021



Patient:ZULMA JOHNSON

Sex:Female

:1985

External Reference #:[id number]





Demographics







                          Phone                     Unavailable

 

                          Preferred Language        Unknown

 

                          Marital Status            Unknown

 

                          Zoroastrianism Affiliation     Unknown

 

                          Race                      Unknown

 

                          Ethnic Group              Unknown









Author







                          Organization              [facility]

 

                          Address                    [place] [address]

 

                          Phone                     [phone]









Social History







                     date                description         facility

 

                     [id number]

## 2021-04-08 NOTE — ED PHYSICIAN DOCUMENTATION
History of Present Illness





- Stated complaint


Stated Complaint: LT LEG PX





- Chief complaint


Chief Complaint: Ext Problem





- Additonal information


Additional information: 


35-year-old female presents the emergency department for evaluation of acute 

lower leg pain sudden onset.  Described as sharp possibly cramping medial calf 

and shin.  She has no fevers or erythema.  No history of DVT or cancer.  She is 

not on oral hormone use. Patient thought that maybe the pain was associated with

recently starting to workout again but denies that it felt like a typical muscle

cramp.





She is currently seeing a neurologist for variety of symptoms that range from 

recurrent pains as well as late onset congenital adrenal hypoplasia.





Patient denies any fevers.  There is no leg swelling. 





2 days of left lower pelvic pain as well.  Does have a history of ovarian cyst. 

Not currently painful in the emergency department.








Review of Systems


Constitutional: denies: Fever, Chills


Eyes: reports: Reviewed and negative


Ears: reports: Reviewed and negative


Nose: reports: Reviewed and negative


Throat: reports: Reviewed and negative


Cardiac: reports: Reviewed and negative


Respiratory: reports: Reviewed and negative


GI: reports: Abdominal Pain


: denies: Dysuria, Frequency, Hesitancy


Skin: denies: Rash, Lesions


Musculoskeletal: reports: Extremity pain (left leg).  denies: Neck pain, Back 

pain


Neurologic: reports: Reviewed and negative.  denies: Focal weakness, Numbness, 

Difficulty speaking





PD PAST MEDICAL HISTORY





- Past Medical History


Past Medical History: Yes


Cardiovascular: None


Respiratory: None


Neuro: Headaches


Endocrine/Autoimmune: Other


GI: None


GYN: Ovarian cysts


: None


HEENT: None


Psych: Depression, Anxiety


Musculoskeletal: None


Derm: Rosacea





- Past Surgical History


Past Surgical History: Yes


General: Other


/GYN: Oophrectomy





- Present Medications


Home Medications: 


                                Ambulatory Orders











 Medication  Instructions  Recorded  Confirmed


 


Famotidine [Pepcid] 20 mg PO BID #60 tablet 10/28/20 


 


Ibuprofen [Motrin] 600 mg PO Q6H PRN #30 tab 12/23/20 


 


Pimecrolimus [Elidel] 30 gm TP 12/23/20 














- Allergies


Allergies/Adverse Reactions: 


                                    Allergies











Allergy/AdvReac Type Severity Reaction Status Date / Time


 


cephalexin Allergy  Hives Verified 04/08/21 18:26


 


codeine Allergy  Hives Verified 04/08/21 18:26


 


fluconazole Allergy  Respiratory Verified 04/08/21 18:26


 


minocycline Allergy  Respiratory Verified 04/08/21 18:26


 


sulfamethoxazole Allergy  Respiratory Verified 04/08/21 18:26





[From Bactrim]     


 


Tetracyclines Allergy  Respiratory Verified 04/08/21 18:26


 


trimethoprim [From Bactrim] Allergy  Respiratory Verified 04/08/21 18:26


 


hydromorphone [From Dilaudid] AdvReac  Anxiety Verified 04/08/21 18:26


 


oxycodone AdvReac  Unknown Verified 04/08/21 18:26


 


bromopex AdvReac Severe problems Uncoded 04/08/21 18:26





   w/ airway  














- Social History


Does the pt smoke?: No


Smoking Status: Never smoker


Does the pt drink ETOH?: Yes


Does the pt have substance abuse?: No





- Immunizations


Immunizations are current?: Yes





- POLST


Patient has POLST: No





PD ED PE NORMAL





- General


General: Alert and oriented X 3, No acute distress, Well developed/nourished





- HEENT


HEENT: Atraumatic





- Neck


Neck: Supple, no meningeal sign, No adenopathy





- Cardiac


Cardiac: RRR, No murmur





- Respiratory


Respiratory: No respiratory distress, Clear bilaterally





- Abdomen


Abdomen: Normal bowel sounds, Non tender





- Back


Back: No CVA TTP





- Extremities


Extremities: No deformity, No tenderness to palpate, Normal ROM s pain, No 

edema, No calf tenderness / cord





Results





- Vitals


Vitals: 





                               Vital Signs - 24 hr











  04/08/21 04/08/21





  18:26 20:10


 


Temperature 36.6 C 


 


Heart Rate 82 88


 


Respiratory 16 16





Rate  


 


Blood Pressure 119/72 133/80 H


 


O2 Saturation 100 100








                                     Oxygen











O2 Source                      Room air

















- Labs


Labs: 





                                Laboratory Tests











  04/08/21





  18:30


 


Urine Color  YELLOW


 


Urine Clarity  CLEAR


 


Urine pH  6.5


 


Ur Specific Gravity  1.010


 


Urine Protein  NEGATIVE


 


Urine Glucose (UA)  NEGATIVE


 


Urine Ketones  NEGATIVE


 


Urine Occult Blood  NEGATIVE


 


Urine Nitrite  NEGATIVE


 


Urine Bilirubin  NEGATIVE


 


Urine Urobilinogen  0.2 (NORMAL)


 


Ur Leukocyte Esterase  NEGATIVE


 


Ur Microscopic Review  NOT INDICATED


 


Urine Culture Comments  NOT INDICATED


 


Urine HCG, Qual  NEGATIVE














- Rads (name of study)


  ** US DVT


Radiology: See rad report, Other (Per ultrasound technologist no DVT )





PD MEDICAL DECISION MAKING





- ED course


Complexity details: reviewed results, re-evaluated patient


ED course: 





35-year-old female presents emergency department for evaluation of acute left 

lower shin and calf pain that lasted about 15 minutes.  She denies that it felt 

like a muscle cramp.  Ultrasound DVT here is negative.  There is no erythema or 

swelling consistent with cellulitis.  She is currently seeing a neurologist for 

variety of concerns that also include intermittent pain syndromes.  I encourage 

patient to continue to follow-up with her neurologist.  Emergent return 

precautions were discussed.





Departure





- Departure


Disposition: 01 Home, Self Care


Clinical Impression: 


 Pain in left lower leg





Condition: Stable


Record reviewed to determine appropriate education?: Yes


Comments: 


Alie stevens were seen here in the emergency department for left lower leg pain.

 The leg is not hot or swelling.  There is no infection.  We did do an 

ultrasound looking for blood clots and none were seen.





The cause of the pain is not clear.  It may have been in a muscle cramp.  

However as you have been seen by a neurologist for a variety of other painful 

syndromes I would like you to discuss this ED visit with him.





If at any point you develop leg swelling, have leg redness, chest pain or 

shortness of air then please return to the emergency department.

## 2021-04-08 NOTE — ULTRASOUND REPORT
PROCEDURE:  Duplex Ext Veins Left

 

INDICATIONS:  pain/swelling

 

TECHNIQUE:  

Real-time imaging, as well as color and pulse Doppler interrogation, were performed of the lower extr
emity deep veins from the inguinal ligament to the popliteal fossa.  

 

COMPARISON:  None.

 

FINDINGS:  The deep veins are normally compressible, and free of intraluminal thrombus.  Color and pu
lse Doppler demonstrate normal phasic intraluminal flow.  There is normal augmentation response to di
stal compression maneuver.  

 

IMPRESSION:  

 

1. No evidence of deep venous thrombosis in the left lower extremity.

 

Reviewed by: Steve Ching MD on 4/8/2021 9:02 PM PDT

Approved by: Steve Ching MD on 4/8/2021 9:02 PM PDT

 

 

Station ID:  [id number]

## 2021-05-16 ENCOUNTER — HOSPITAL ENCOUNTER (EMERGENCY)
Dept: HOSPITAL 76 - ED | Age: 36
Discharge: HOME | End: 2021-05-16
Payer: COMMERCIAL

## 2021-05-16 VITALS — SYSTOLIC BLOOD PRESSURE: 112 MMHG | DIASTOLIC BLOOD PRESSURE: 70 MMHG

## 2021-05-16 DIAGNOSIS — R07.2: Primary | ICD-10-CM

## 2021-05-16 LAB
ALBUMIN DIAFP-MCNC: 4.6 G/DL (ref 3.2–5.5)
ALBUMIN/GLOB SERPL: 1.4 {RATIO} (ref 1–2.2)
ALP SERPL-CCNC: 53 IU/L (ref 42–121)
ALT SERPL W P-5'-P-CCNC: 27 IU/L (ref 10–60)
ANION GAP SERPL CALCULATED.4IONS-SCNC: 7 MMOL/L (ref 6–13)
AST SERPL W P-5'-P-CCNC: 20 IU/L (ref 10–42)
BASOPHILS NFR BLD AUTO: 0 10^3/UL (ref 0–0.1)
BASOPHILS NFR BLD AUTO: 0.7 %
BILIRUB BLD-MCNC: 0.4 MG/DL (ref 0.2–1)
BUN SERPL-MCNC: 13 MG/DL (ref 6–20)
CALCIUM UR-MCNC: 9.6 MG/DL (ref 8.5–10.3)
CHLORIDE SERPL-SCNC: 103 MMOL/L (ref 101–111)
CK SERPL-CCNC: 58 IU/L (ref 22–269)
CO2 SERPL-SCNC: 28 MMOL/L (ref 21–32)
CREAT SERPLBLD-SCNC: 0.6 MG/DL (ref 0.4–1)
EOSINOPHIL # BLD AUTO: 0.1 10^3/UL (ref 0–0.7)
EOSINOPHIL NFR BLD AUTO: 1.2 %
ERYTHROCYTE [DISTWIDTH] IN BLOOD BY AUTOMATED COUNT: 11.8 % (ref 12–15)
GFRSERPLBLD MDRD-ARVRAT: 112 ML/MIN/{1.73_M2} (ref 89–?)
GLOBULIN SER-MCNC: 3.4 G/DL (ref 2.1–4.2)
GLUCOSE SERPL-MCNC: 103 MG/DL (ref 70–100)
HCT VFR BLD AUTO: 47.9 % (ref 37–47)
HGB UR QL STRIP: 16 G/DL (ref 12–16)
LIPASE SERPL-CCNC: 31 U/L (ref 22–51)
LYMPHOCYTES # SPEC AUTO: 1.4 10^3/UL (ref 1.5–3.5)
LYMPHOCYTES NFR BLD AUTO: 25.5 %
MCH RBC QN AUTO: 30.8 PG (ref 27–31)
MCHC RBC AUTO-ENTMCNC: 33.4 G/DL (ref 32–36)
MCV RBC AUTO: 92.3 FL (ref 81–99)
MONOCYTES # BLD AUTO: 0.4 10^3/UL (ref 0–1)
MONOCYTES NFR BLD AUTO: 7.4 %
NEUTROPHILS # BLD AUTO: 3.7 10^3/UL (ref 1.5–6.6)
NEUTROPHILS # SNV AUTO: 5.6 X10^3/UL (ref 4.8–10.8)
NEUTROPHILS NFR BLD AUTO: 64.8 %
NRBC # BLD AUTO: 0 /100WBC
NRBC # BLD AUTO: 0 X10^3/UL
PDW BLD AUTO: 9.9 FL (ref 7.9–10.8)
PLATELET # BLD: 193 10^3/UL (ref 130–450)
POTASSIUM SERPL-SCNC: 4 MMOL/L (ref 3.5–5)
PROT SPEC-MCNC: 8 G/DL (ref 6.7–8.2)
RBC MAR: 5.19 10^6/UL (ref 4.2–5.4)
SODIUM SERPLBLD-SCNC: 138 MMOL/L (ref 135–145)

## 2021-05-16 PROCEDURE — 80053 COMPREHEN METABOLIC PANEL: CPT

## 2021-05-16 PROCEDURE — 96375 TX/PRO/DX INJ NEW DRUG ADDON: CPT

## 2021-05-16 PROCEDURE — 84484 ASSAY OF TROPONIN QUANT: CPT

## 2021-05-16 PROCEDURE — 85025 COMPLETE CBC W/AUTO DIFF WBC: CPT

## 2021-05-16 PROCEDURE — 83690 ASSAY OF LIPASE: CPT

## 2021-05-16 PROCEDURE — 99284 EMERGENCY DEPT VISIT MOD MDM: CPT

## 2021-05-16 PROCEDURE — 83880 ASSAY OF NATRIURETIC PEPTIDE: CPT

## 2021-05-16 PROCEDURE — 96374 THER/PROPH/DIAG INJ IV PUSH: CPT

## 2021-05-16 PROCEDURE — 85651 RBC SED RATE NONAUTOMATED: CPT

## 2021-05-16 PROCEDURE — 36415 COLL VENOUS BLD VENIPUNCTURE: CPT

## 2021-05-16 PROCEDURE — 93005 ELECTROCARDIOGRAM TRACING: CPT

## 2021-05-16 PROCEDURE — 82550 ASSAY OF CK (CPK): CPT

## 2021-05-16 NOTE — CT REPORT
PROCEDURE:  CHEST WO

 

INDICATIONS:  chest pain; allergy to dye

 

TECHNIQUE: 

Noncontrast 5 mm thick sections acquired from the pulmonary apices to the posterior costophrenic angl
es.  7 mm thick coronal and sagittal MIP reformats were then acquired.  For radiation dose reduction,
 the following was used:  automated exposure control, adjustment of mA and/or kV according to patient
 size.

 

COMPARISON:  Same-day chest radiographs

 

FINDINGS:  

Image quality:  Excellent.  

 

Lungs and pleura:  No acute air space opacities.  No pleural effusions or pneumothorax.  Central and 
peripheral airways are patent and normal in caliber.  

 

Mediastinum:  Heart size is normal.  No pericardial effusion.  No mediastinal adenopathy by size crit
eria.  Thoracic aorta and central pulmonary arteries are normal in size.  Esophagus is normal in guerda
dyana.  No hiatal hernia.  

 

Bones and chest wall:  No suspicious bony lesions.  Mild degenerative changes of the spine. There are
 mildly prominent transverse processes of C7. There is likely a small cervical rib on the right. No v
ertebral body compression fractures.  No axillary or supraclavicular adenopathy by size criteria.  Th
e thyroid is normal in size.  

 

Abdomen:  Visualized upper abdominal solid organs and bowel loops appear normal in the absence of con
trast.  

 

IMPRESSION:  

No acute intrathoracic abnormality to explain symptoms.

 

Reviewed by: Darron Still DO on 5/16/2021 12:50 PM MOHINI

Approved by: Darron Still DO on 5/16/2021 12:50 PM MOHINI

 

 

Station ID:  [id number]

## 2021-05-16 NOTE — ED PHYSICIAN DOCUMENTATION
PD HPI CHEST PAIN





- Stated complaint


Stated Complaint: CHEST PX/SOA





- Chief complaint


Chief Complaint: General





- History obtained from


History obtained from: Patient





- History of Present Illness


Timing - onset: Last night, Yesterday


Timing - onset during: Rest


Timing - duration: Hours


Timing - details: Abrupt onset, Still present


Quality: Aching, Sharp, Pain


Location: Substernal, Left chest


Radiation: Back.  No: Jaw, Neck


Improved by: Rest


Worsened by: Movement (hurts the most with leaning forward).  No: Exertion, 

Inspiration


Associated symptoms: General Weakness.  No: Shortness of air, Nausea, Vomiting, 

Feeling faint / dizzy, Palpitations


Similar symptoms before: Has not had sx before


Recently seen: Not recently seen





Review of Systems


Constitutional: denies: Fever, Chills


Nose: denies: Rhinorrhea / runny nose, Congestion


Throat: denies: Sore throat


Cardiac: reports: Chest pain / pressure.  denies: Palpitations, Pedal edema, 

Calf pain


Respiratory: reports: Dyspnea.  denies: Cough, Wheezing


GI: denies: Abdominal Pain, Nausea, Vomiting, Diarrhea


Skin: denies: Rash, Lesions


Musculoskeletal: denies: Extremity swelling


Neurologic: reports: Generalized weakness.  denies: Focal weakness, Numbness, 

Near syncope





PD PAST MEDICAL HISTORY





- Past Medical History


Cardiovascular: None


Respiratory: None


Neuro: Headaches


Endocrine/Autoimmune: Other


GI: None


GYN: Ovarian cysts


: None


HEENT: None


Psych: Depression, Anxiety


Musculoskeletal: None


Derm: Rosacea





- Past Surgical History


Past Surgical History: Yes


General: Other


/GYN: Oophrectomy





- Present Medications


Home Medications: 


                                Ambulatory Orders











 Medication  Instructions  Recorded  Confirmed


 


Famotidine [Pepcid] 20 mg PO BID #60 tablet 10/28/20 


 


Ibuprofen [Motrin] 600 mg PO Q6H PRN #30 tab 12/23/20 


 


Pimecrolimus [Elidel] 30 gm TP 12/23/20 


 


dexAMETHasone [Decadron] 4 mg PO DAILY 7 Days #7 tablet 05/16/21 














- Allergies


Allergies/Adverse Reactions: 


                                    Allergies











Allergy/AdvReac Type Severity Reaction Status Date / Time


 


cephalexin Allergy  Hives Verified 05/16/21 13:08


 


ciprofloxacin [From Cipro] Allergy  Rash Verified 05/16/21 13:08


 


codeine Allergy  Hives Verified 05/16/21 13:08


 


fluconazole Allergy  Respiratory Verified 05/16/21 13:08


 


minocycline Allergy  Respiratory Verified 05/16/21 13:08


 


sulfamethoxazole Allergy  Respiratory Verified 05/16/21 13:08





[From Bactrim]     


 


Tetracyclines Allergy  Respiratory Verified 05/16/21 13:08


 


trimethoprim [From Bactrim] Allergy  Respiratory Verified 05/16/21 13:08


 


hydromorphone [From Dilaudid] AdvReac  Anxiety Verified 05/16/21 13:08


 


oxycodone AdvReac  Unknown Verified 05/16/21 13:08


 


bromopex AdvReac Severe problems Uncoded 05/16/21 13:08





   w/ airway  














- Social History


Does the pt smoke?: No


Smoking Status: Never smoker


Does the pt drink ETOH?: Yes


Does the pt have substance abuse?: No





- Immunizations


Immunizations are current?: Yes





- POLST


Patient has POLST: No





PD ED PE NORMAL





- Vitals


Vital signs reviewed: Yes





- General


General: Alert and oriented X 3, Well developed/nourished, Other (appears 

uncomfortable, in pain, more with sitting forward. )





- HEENT


HEENT: Pharynx benign





- Neck


Neck: Supple, no meningeal sign, No adenopathy





- Cardiac


Cardiac: RRR, No murmur, No rub





- Respiratory


Respiratory: No respiratory distress, Clear bilaterally, Other (no chestwall 

tenderness per se. )





- Abdomen


Abdomen: Normal bowel sounds, Soft, Non tender





- Derm


Derm: Normal color, Warm and dry





- Extremities


Extremities: No tenderness to palpate, Normal ROM s pain, No edema, No calf 

tenderness / cord





- Neuro


Neuro: Alert and oriented X 3, No motor deficit, No sensory deficit, Normal 

speech





Results





- Vitals


Vitals: 


                               Vital Signs - 24 hr











  05/16/21 05/16/21 05/16/21





  11:28 12:00 14:51


 


Temperature 36.6 C  


 


Heart Rate 76 72 65


 


Respiratory 16 14 12





Rate   


 


Blood Pressure 116/72 110/73 112/70


 


O2 Saturation 99 100 100








                                     Oxygen











O2 Source                      Room air

















- EKG (time done)


  ** 11:25


Rate: Rate (enter#) (78)


Rhythm: NSR


Axis: Normal


Intervals: Normal AR


QRS: Normal


Ischemia: Normal ST segments.  No: ST elevation c/w ischemia, ST depression





- Labs


Labs: 


                                Laboratory Tests











  05/16/21 05/16/21 05/16/21





  13:04 13:04 13:04


 


WBC  5.6  


 


RBC  5.19  


 


Hgb  16.0  


 


Hct  47.9 H  


 


MCV  92.3  


 


MCH  30.8  


 


MCHC  33.4  


 


RDW  11.8 L  


 


Plt Count  193  


 


MPV  9.9  


 


Neut # (Auto)  3.7  


 


Lymph # (Auto)  1.4 L  


 


Mono # (Auto)  0.4  


 


Eos # (Auto)  0.1  


 


Baso # (Auto)  0.0  


 


Absolute Nucleated RBC  0.00  


 


Nucleated RBC %  0.0  


 


ESR   


 


Sodium   138 


 


Potassium   4.0 


 


Chloride   103 


 


Carbon Dioxide   28 


 


Anion Gap   7.0 


 


BUN   13 


 


Creatinine   0.6 


 


Estimated GFR (MDRD)   112 


 


Glucose   103 H 


 


Calcium   9.6 


 


Total Bilirubin   0.4 


 


AST   20 


 


ALT   27 


 


Alkaline Phosphatase   53 


 


Total Creatine Kinase   58 


 


Troponin I High Sens    < 2.3 L


 


B-Natriuretic Peptide   


 


Total Protein   8.0 


 


Albumin   4.6 


 


Globulin   3.4 


 


Albumin/Globulin Ratio   1.4 


 


Lipase   31 














  05/16/21 05/16/21





  13:04 13:04


 


WBC  


 


RBC  


 


Hgb  


 


Hct  


 


MCV  


 


MCH  


 


MCHC  


 


RDW  


 


Plt Count  


 


MPV  


 


Neut # (Auto)  


 


Lymph # (Auto)  


 


Mono # (Auto)  


 


Eos # (Auto)  


 


Baso # (Auto)  


 


Absolute Nucleated RBC  


 


Nucleated RBC %  


 


ESR   4


 


Sodium  


 


Potassium  


 


Chloride  


 


Carbon Dioxide  


 


Anion Gap  


 


BUN  


 


Creatinine  


 


Estimated GFR (MDRD)  


 


Glucose  


 


Calcium  


 


Total Bilirubin  


 


AST  


 


ALT  


 


Alkaline Phosphatase  


 


Total Creatine Kinase  


 


Troponin I High Sens  


 


B-Natriuretic Peptide  28 


 


Total Protein  


 


Albumin  


 


Globulin  


 


Albumin/Globulin Ratio  


 


Lipase  














- Rads (name of study)


  ** chest CT


Radiology: Prelim report reviewed (no acute process), See rad report





PD MEDICAL DECISION MAKING





- ED course


Complexity details: reviewed results, re-evaluated patient (pain improved with 

IV meds here. She is feeling decreased pain enough. ), considered differential, 

d/w patient





Departure





- Departure


Disposition: 01 Home, Self Care


Clinical Impression: 


Chest pain


Qualifiers:


 Chest pain type: precordial pain Qualified Code(s): R07.2 - Precordial pain





Condition: Stable


Record reviewed to determine appropriate education?: Yes


Instructions:  ED Chest Pain Atypical Unkn Cause


Follow-Up: 


Vickie Glover PA-C [Primary Care Provider] - 


Prescriptions: 


dexAMETHasone [Decadron] 4 mg PO DAILY 7 Days #7 tablet


Comments: 


Stay well-hydrated.  Use ibuprofen to 3 times a day.  Add Tylenol every 4-6 

hours if needed for pain.  No signs of more significant cause of your pain.  

Presume some inflammation in the chest wall or perhaps around the lung or heart 

sac at the worst.  These would all be treated with anti-inflammatories we could 

also add in Decadron steroid anti-inflammatory daily for several days as well.





Recheck if not improved well over the next several days.  Return if worsening.


Discharge Date/Time: 05/16/21 15:20

## 2021-06-24 ENCOUNTER — HOSPITAL ENCOUNTER (EMERGENCY)
Dept: HOSPITAL 76 - ED | Age: 36
Discharge: HOME | End: 2021-06-24
Payer: COMMERCIAL

## 2021-06-24 VITALS — SYSTOLIC BLOOD PRESSURE: 115 MMHG | DIASTOLIC BLOOD PRESSURE: 76 MMHG

## 2021-06-24 DIAGNOSIS — N83.202: Primary | ICD-10-CM

## 2021-06-24 LAB
ALBUMIN DIAFP-MCNC: 4.5 G/DL (ref 3.2–5.5)
ALBUMIN/GLOB SERPL: 1.3 {RATIO} (ref 1–2.2)
ALP SERPL-CCNC: 57 IU/L (ref 42–121)
ALT SERPL W P-5'-P-CCNC: 31 IU/L (ref 10–60)
ANION GAP SERPL CALCULATED.4IONS-SCNC: 8 MMOL/L (ref 6–13)
AST SERPL W P-5'-P-CCNC: 26 IU/L (ref 10–42)
BASOPHILS NFR BLD AUTO: 0 10^3/UL (ref 0–0.1)
BASOPHILS NFR BLD AUTO: 0.5 %
BILIRUB BLD-MCNC: 0.6 MG/DL (ref 0.2–1)
BUN SERPL-MCNC: 15 MG/DL (ref 6–20)
CALCIUM UR-MCNC: 9.6 MG/DL (ref 8.5–10.3)
CHLORIDE SERPL-SCNC: 106 MMOL/L (ref 101–111)
CLARITY UR REFRACT.AUTO: CLEAR
CO2 SERPL-SCNC: 26 MMOL/L (ref 21–32)
CREAT SERPLBLD-SCNC: 0.7 MG/DL (ref 0.4–1)
EOSINOPHIL # BLD AUTO: 0.1 10^3/UL (ref 0–0.7)
EOSINOPHIL NFR BLD AUTO: 1.2 %
ERYTHROCYTE [DISTWIDTH] IN BLOOD BY AUTOMATED COUNT: 11.9 % (ref 12–15)
GFRSERPLBLD MDRD-ARVRAT: 94 ML/MIN/{1.73_M2} (ref 89–?)
GLOBULIN SER-MCNC: 3.5 G/DL (ref 2.1–4.2)
GLUCOSE SERPL-MCNC: 94 MG/DL (ref 70–100)
GLUCOSE UR QL STRIP.AUTO: NEGATIVE MG/DL
HCG UR QL: NEGATIVE
HCT VFR BLD AUTO: 44.8 % (ref 37–47)
HGB UR QL STRIP: 15.5 G/DL (ref 12–16)
KETONES UR QL STRIP.AUTO: NEGATIVE MG/DL
LIPASE SERPL-CCNC: 33 U/L (ref 22–51)
LYMPHOCYTES # SPEC AUTO: 2.4 10^3/UL (ref 1.5–3.5)
LYMPHOCYTES NFR BLD AUTO: 29.5 %
MCH RBC QN AUTO: 31.8 PG (ref 27–31)
MCHC RBC AUTO-ENTMCNC: 34.6 G/DL (ref 32–36)
MCV RBC AUTO: 91.8 FL (ref 81–99)
MONOCYTES # BLD AUTO: 0.6 10^3/UL (ref 0–1)
MONOCYTES NFR BLD AUTO: 7.3 %
NEUTROPHILS # BLD AUTO: 5 10^3/UL (ref 1.5–6.6)
NEUTROPHILS # SNV AUTO: 8.2 X10^3/UL (ref 4.8–10.8)
NEUTROPHILS NFR BLD AUTO: 61.3 %
NITRITE UR QL STRIP.AUTO: NEGATIVE
NRBC # BLD AUTO: 0 /100WBC
NRBC # BLD AUTO: 0 X10^3/UL
PDW BLD AUTO: 9.9 FL (ref 7.9–10.8)
PH UR STRIP.AUTO: 6 PH (ref 5–7.5)
PLATELET # BLD: 218 10^3/UL (ref 130–450)
POTASSIUM SERPL-SCNC: 3.9 MMOL/L (ref 3.5–5)
PROT SPEC-MCNC: 8 G/DL (ref 6.7–8.2)
PROT UR STRIP.AUTO-MCNC: NEGATIVE MG/DL
RBC # UR STRIP.AUTO: NEGATIVE /UL
RBC MAR: 4.88 10^6/UL (ref 4.2–5.4)
SODIUM SERPLBLD-SCNC: 140 MMOL/L (ref 135–145)
SP GR UR STRIP.AUTO: <=1.005 (ref 1–1.03)
UROBILINOGEN UR QL STRIP.AUTO: (no result) E.U./DL
UROBILINOGEN UR STRIP.AUTO-MCNC: NEGATIVE MG/DL

## 2021-06-24 PROCEDURE — 81003 URINALYSIS AUTO W/O SCOPE: CPT

## 2021-06-24 PROCEDURE — 83690 ASSAY OF LIPASE: CPT

## 2021-06-24 PROCEDURE — 81001 URINALYSIS AUTO W/SCOPE: CPT

## 2021-06-24 PROCEDURE — 80053 COMPREHEN METABOLIC PANEL: CPT

## 2021-06-24 PROCEDURE — 87086 URINE CULTURE/COLONY COUNT: CPT

## 2021-06-24 PROCEDURE — 81025 URINE PREGNANCY TEST: CPT

## 2021-06-24 PROCEDURE — 96374 THER/PROPH/DIAG INJ IV PUSH: CPT

## 2021-06-24 PROCEDURE — 99284 EMERGENCY DEPT VISIT MOD MDM: CPT

## 2021-06-24 PROCEDURE — 36415 COLL VENOUS BLD VENIPUNCTURE: CPT

## 2021-06-24 PROCEDURE — 85025 COMPLETE CBC W/AUTO DIFF WBC: CPT

## 2021-06-24 NOTE — ULTRASOUND REPORT
PROCEDURE:  Transvaginal

 

INDICATIONS:  ? LEFT OVARIAN CYST

 

 

TECHNIQUE: 

Transvaginal pelvic ultrasound.

 

COMPARISON:  None.

 

FINDINGS:  Uterus is surgically absent. Right ovary is surgically absent. Left ovary demonstrates a h
emorrhagic cyst measuring 27 mm. Left ovary measures 45 mm in diameter.

 

IMPRESSION:  

1. Postsurgical sequelae.

2. Left ovarian cyst.

 

Reviewed by: Rodrigo Fontenot MD on 6/24/2021 9:59 PM PDT

Approved by: Rodrigo Fontenot MD on 6/24/2021 9:59 PM PDT

 

 

Station ID:  [id number]

## 2021-06-24 NOTE — ED PHYSICIAN DOCUMENTATION
History of Present Illness





- Stated complaint


Stated Complaint: RT FLANK PX





- Chief complaint


Chief Complaint: Abd Pain





- Additonal information


Additional information: 


35-year-old female presents emergency department for evaluation of right flank 

and right lower quadrant abdominal pain that she describes as the worst pain of 

her life.  Began about 3 hours prior to arrival.  Some nausea no vomiting.  No 

fevers.  No dysuria urgency or frequency.  She states that she has had pain 

similar though not as severe when she had an ovarian cyst.  Secondary to very 

large ovarian cyst she has had a right tubo oophorectomy. Took Tylenol for pain 

without relief.  She declines narcotic prescriptions at this time.  She is 

followed [facility]








Review of Systems


Constitutional: denies: Fever, Chills


Eyes: reports: Reviewed and negative


Ears: reports: Reviewed and negative


Nose: reports: Reviewed and negative


Throat: reports: Reviewed and negative


Cardiac: reports: Reviewed and negative


Respiratory: reports: Reviewed and negative


GI: reports: Abdominal Pain, Nausea.  denies: Vomiting, Hematemesis


: denies: Dysuria, Frequency


Skin: denies: Rash, Lesions


Musculoskeletal: reports: Reviewed and negative


Neurologic: reports: Reviewed and negative





PD PAST MEDICAL HISTORY





- Past Medical History


Cardiovascular: None


Respiratory: None


Neuro: Headaches


Endocrine/Autoimmune: Other


GI: None


GYN: Ovarian cysts


: None


HEENT: None


Psych: Depression, Anxiety


Musculoskeletal: None


Derm: Rosacea





- Past Surgical History


Past Surgical History: Yes


General: Other


/GYN: Oophrectomy





- Present Medications


Home Medications: 


                                Ambulatory Orders











 Medication  Instructions  Recorded  Confirmed


 


Famotidine [Pepcid] 20 mg PO BID #60 tablet 10/28/20 


 


Ibuprofen [Motrin] 600 mg PO Q6H PRN #30 tab 12/23/20 


 


Pimecrolimus [Elidel] 30 gm TP 12/23/20 


 


dexAMETHasone [Decadron] 4 mg PO DAILY 7 Days #7 tablet 05/16/21 


 


Ibuprofen [Motrin] 600 mg PO Q6H PRN #30 tab 06/24/21 














- Allergies


Allergies/Adverse Reactions: 


                                    Allergies











Allergy/AdvReac Type Severity Reaction Status Date / Time


 


cephalexin Allergy  Hives Verified 06/24/21 17:21


 


ciprofloxacin [From Cipro] Allergy  Rash Verified 06/24/21 17:21


 


codeine Allergy  Hives Verified 06/24/21 17:21


 


fluconazole Allergy  Respiratory Verified 06/24/21 17:21


 


minocycline Allergy  Respiratory Verified 06/24/21 17:21


 


sulfamethoxazole Allergy  Respiratory Verified 06/24/21 17:21





[From Bactrim]     


 


Tetracyclines Allergy  Respiratory Verified 06/24/21 17:21


 


trimethoprim [From Bactrim] Allergy  Respiratory Verified 06/24/21 17:21


 


hydromorphone [From Dilaudid] AdvReac  Anxiety Verified 06/24/21 17:21


 


oxycodone AdvReac  Unknown Verified 06/24/21 17:21


 


bromopex AdvReac Severe problems Uncoded 06/24/21 17:21





   w/ airway  














- Social History


Does the pt smoke?: No


Smoking Status: Never smoker


Does the pt drink ETOH?: Yes


Does the pt have substance abuse?: No





- Immunizations


Immunizations are current?: Yes





- POLST


Patient has POLST: No





PD ED PE NORMAL





- General


General: Alert and oriented X 3, No acute distress





- Neck


Neck: Supple, no meningeal sign, No adenopathy





- Cardiac


Cardiac: RRR, No murmur





- Respiratory


Respiratory: No respiratory distress, Clear bilaterally





- Abdomen


Abdomen: Normal bowel sounds, Soft.  No: Non tender (tender RLQ; mild rebound.  

no guarding.  equivocal mcburneys)





- Back


Back: No CVA TTP





- Derm


Derm: Normal color





- Extremities


Extremities: No deformity, No tenderness to palpate, Normal ROM s pain





Results





- Vitals


Vitals: 


                               Vital Signs - 24 hr











  06/24/21 06/24/21 06/24/21





  17:21 18:41 20:00


 


Temperature 36.5 C  


 


Heart Rate 68 74 64


 


Respiratory 16 16 20





Rate   


 


Blood Pressure 126/59 L 122/75 108/70


 


O2 Saturation 100 100 100








                                     Oxygen











O2 Source                      Room air

















- Labs


Labs: 


                                Laboratory Tests











  06/24/21 06/24/21 06/24/21





  17:28 17:28 17:36


 


WBC    8.2


 


RBC    4.88


 


Hgb    15.5


 


Hct    44.8


 


MCV    91.8


 


MCH    31.8 H


 


MCHC    34.6


 


RDW    11.9 L


 


Plt Count    218


 


MPV    9.9


 


Neut # (Auto)    5.0


 


Lymph # (Auto)    2.4


 


Mono # (Auto)    0.6


 


Eos # (Auto)    0.1


 


Baso # (Auto)    0.0


 


Absolute Nucleated RBC    0.00


 


Nucleated RBC %    0.0


 


Sodium   


 


Potassium   


 


Chloride   


 


Carbon Dioxide   


 


Anion Gap   


 


BUN   


 


Creatinine   


 


Estimated GFR (MDRD)   


 


Glucose   


 


Calcium   


 


Total Bilirubin   


 


AST   


 


ALT   


 


Alkaline Phosphatase   


 


Total Protein   


 


Albumin   


 


Globulin   


 


Albumin/Globulin Ratio   


 


Lipase   


 


Urine Color  LIGHT YELLOW  


 


Urine Clarity  CLEAR  


 


Urine pH  6.0  


 


Ur Specific Gravity  <=1.005  


 


Urine Protein  NEGATIVE  


 


Urine Glucose (UA)  NEGATIVE  


 


Urine Ketones  NEGATIVE  


 


Urine Occult Blood  NEGATIVE  


 


Urine Nitrite  NEGATIVE  


 


Urine Bilirubin  NEGATIVE  


 


Urine Urobilinogen  0.2 (NORMAL)  


 


Ur Leukocyte Esterase  NEGATIVE  


 


Ur Microscopic Review  NOT INDICATED  


 


Urine Culture Comments  NOT INDICATED  


 


Urine HCG, Qual   NEGATIVE 














  06/24/21





  17:36


 


WBC 


 


RBC 


 


Hgb 


 


Hct 


 


MCV 


 


MCH 


 


MCHC 


 


RDW 


 


Plt Count 


 


MPV 


 


Neut # (Auto) 


 


Lymph # (Auto) 


 


Mono # (Auto) 


 


Eos # (Auto) 


 


Baso # (Auto) 


 


Absolute Nucleated RBC 


 


Nucleated RBC % 


 


Sodium  140


 


Potassium  3.9


 


Chloride  106


 


Carbon Dioxide  26


 


Anion Gap  8.0


 


BUN  15


 


Creatinine  0.7


 


Estimated GFR (MDRD)  94


 


Glucose  94


 


Calcium  9.6


 


Total Bilirubin  0.6


 


AST  26


 


ALT  31


 


Alkaline Phosphatase  57


 


Total Protein  8.0


 


Albumin  4.5


 


Globulin  3.5


 


Albumin/Globulin Ratio  1.3


 


Lipase  33


 


Urine Color 


 


Urine Clarity 


 


Urine pH 


 


Ur Specific Gravity 


 


Urine Protein 


 


Urine Glucose (UA) 


 


Urine Ketones 


 


Urine Occult Blood 


 


Urine Nitrite 


 


Urine Bilirubin 


 


Urine Urobilinogen 


 


Ur Leukocyte Esterase 


 


Ur Microscopic Review 


 


Urine Culture Comments 


 


Urine HCG, Qual 














- Rads (name of study)


  ** CT abd


Radiology: Final report received (No evidence of urolithiasis.  Normal 

appendix.)





  ** pelvic US


Radiology: See rad report, Other (Per ultrasound technologist left ovarian cyst 

with positive ovarian blood flow.  Right ovary is surgically absent.)





PD MEDICAL DECISION MAKING





- ED course


Complexity details: reviewed results, re-evaluated patient, d/w patient


ED course: 





35-year-old female presents the emergency department for evaluation of acute 

right lower quadrant abdominal pain as well as right flank pain. She describes 

this pain as the worst of her life.  She does have a history of a right ovarian 

cyst that required tubo oophorectomy.





Screening labs today were essentially unremarkable.  No leukocytosis, preserved 

renal function.  No LFT abnormalities.  Urine showed no signs of infection.  

However she did have some mild guarding and rebound on the right lower quadrant 

thus a CT scan was obtained that did not reveal any worrisome findings.  

Specifically no urolithiasis and a normal-appearing appendix.  We did complete a

pelvic ultrasound which showed a left ovarian cyst hemorrhagic versus 

physiological.  No ovarian torsion.





Patient was given Toradol here in the emergency department with mild relief of 

pain.  She declined any narcotics and requested ibuprofen on discharge.  She 

will follow up with Dr. Ferrera for longer-term evaluation of recurrent ovarian

cysts and long-term management.  Emergent return precautions discussed.





Departure





- Departure


Disposition: 01 Home, Self Care


Clinical Impression: 


 Left ovarian cyst





Condition: Stable


Record reviewed to determine appropriate education?: Yes


Instructions:  ED Cyst Ovarian


Follow-Up: 


Starla Ferrera MD [Provider Admit Priv/Credential] - 


Prescriptions: 


Ibuprofen [Motrin] 600 mg PO Q6H PRN #30 tab


 PRN Reason: Pain


Comments: 


Jun stevens were seen in the emergency department today for right flank flank 

and lower quadrant abdominal pain.  The CT showed a normal-appearing gallbladder

 and appendix.  There are no kidney stones.  There is no findings of a bowel 

obstruction.  Your screening labs including your blood count, urine and blood 

chemistry were all normal.  The pelvic ultrasound did show a left-sided ovarian 

cyst.





It is okay to take the ibuprofen as prescribed with food 3 times a day.  Please 

discuss this ED visit with Dr. Ferrera.  A plan to help manage recurrent your 

long-term ovarian cysts should be discussed.  If at any point you develop 

fevers, have suddenly severe or different pain, uncontrolled vomiting please 

return to the ER for a second look.

## 2021-06-24 NOTE — CT REPORT
PROCEDURE:  Abdomen/Pelvis WO

 

INDICATIONS:  RLQ abdominal pain

 

TECHNIQUE:  

Noncontrast 5 mm thick sections acquired from the diaphragms to the symphysis.  5 mm coronal and sagi
ttal reformats were then performed.  For radiation dose reduction, the following was used:  automated
 exposure control, adjustment of mA and/or kV according to patient size.

 

COMPARISON:  CT dated 12/23/2020

 

FINDINGS:  

Image quality:  Excellent.  

 

ABDOMEN:  

Lung bases:  Lung bases are clear.  Heart size is normal.  

 

Solid organs:  Liver and spleen are normal in size.  Gallbladder is grossly unremarkable  Pancreas is
 normal in contours.  No adrenal nodules.  Kidneys are normal in size, without hydronephrosis or neph
rolithiasis.  

 

Peritoneum and bowel:  Unenhanced bowel loops demonstrate normal wall thickness and caliber.  No free
 fluid or air.  Normal appendix.

 

Nodes and vessels:  No retroperitoneal or mesenteric adenopathy by size criteria.  Aorta and inferior
 vena cava are normal in caliber.  

 

Miscellaneous:  No ventral hernias.  

 

 

PELVIS:  

Genitourinary:  Bladder wall thickness is normal.  

 

Miscellaneous:  No inguinal hernias or adenopathy.  

 

Bones:  No suspicious bony lesions.  No vertebral body compression fractures.  

 

IMPRESSION:  

 

1. No evidence of urinary tract calcification, nor obstruction.

2. Normal appendix.

 

Reviewed by: Rodrigo Fontenot MD on 6/24/2021 8:50 PM PDT

Approved by: Rodrigo Fontenot MD on 6/24/2021 8:50 PM PDT

 

 

Station ID:  [id number]

## 2021-07-29 ENCOUNTER — HOSPITAL ENCOUNTER (EMERGENCY)
Dept: HOSPITAL 76 - ED | Age: 36
Discharge: HOME | End: 2021-07-29
Payer: COMMERCIAL

## 2021-07-29 VITALS — SYSTOLIC BLOOD PRESSURE: 120 MMHG | DIASTOLIC BLOOD PRESSURE: 71 MMHG

## 2021-07-29 DIAGNOSIS — Z3A.00: ICD-10-CM

## 2021-07-29 DIAGNOSIS — O26.891: Primary | ICD-10-CM

## 2021-07-29 DIAGNOSIS — R10.2: ICD-10-CM

## 2021-07-29 LAB
ALBUMIN DIAFP-MCNC: 4.9 G/DL (ref 3.2–5.5)
ALBUMIN/GLOB SERPL: 1.3 {RATIO} (ref 1–2.2)
ALP SERPL-CCNC: 51 IU/L (ref 42–121)
ALT SERPL W P-5'-P-CCNC: 19 IU/L (ref 10–60)
ANION GAP SERPL CALCULATED.4IONS-SCNC: 9 MMOL/L (ref 6–13)
AST SERPL W P-5'-P-CCNC: 19 IU/L (ref 10–42)
BASOPHILS NFR BLD AUTO: 0 10^3/UL (ref 0–0.1)
BASOPHILS NFR BLD AUTO: 0.5 %
BILIRUB BLD-MCNC: 0.7 MG/DL (ref 0.2–1)
BUN SERPL-MCNC: 10 MG/DL (ref 6–20)
CALCIUM UR-MCNC: 9.4 MG/DL (ref 8.5–10.3)
CHLORIDE SERPL-SCNC: 103 MMOL/L (ref 101–111)
CLARITY UR REFRACT.AUTO: CLEAR
CO2 SERPL-SCNC: 25 MMOL/L (ref 21–32)
CREAT SERPLBLD-SCNC: 0.7 MG/DL (ref 0.4–1)
EOSINOPHIL # BLD AUTO: 0.2 10^3/UL (ref 0–0.7)
EOSINOPHIL NFR BLD AUTO: 2 %
ERYTHROCYTE [DISTWIDTH] IN BLOOD BY AUTOMATED COUNT: 12 % (ref 12–15)
GFRSERPLBLD MDRD-ARVRAT: 94 ML/MIN/{1.73_M2} (ref 89–?)
GLOBULIN SER-MCNC: 3.7 G/DL (ref 2.1–4.2)
GLUCOSE SERPL-MCNC: 92 MG/DL (ref 70–100)
GLUCOSE UR QL STRIP.AUTO: NEGATIVE MG/DL
HCG UR QL: POSITIVE
HCT VFR BLD AUTO: 46.2 % (ref 37–47)
HGB UR QL STRIP: 16 G/DL (ref 12–16)
KETONES UR QL STRIP.AUTO: NEGATIVE MG/DL
LIPASE SERPL-CCNC: 33 U/L (ref 22–51)
LYMPHOCYTES # SPEC AUTO: 2.7 10^3/UL (ref 1.5–3.5)
LYMPHOCYTES NFR BLD AUTO: 30.9 %
MCH RBC QN AUTO: 31.9 PG (ref 27–31)
MCHC RBC AUTO-ENTMCNC: 34.6 G/DL (ref 32–36)
MCV RBC AUTO: 92.2 FL (ref 81–99)
MONOCYTES # BLD AUTO: 0.5 10^3/UL (ref 0–1)
MONOCYTES NFR BLD AUTO: 5.8 %
NEUTROPHILS # BLD AUTO: 5.3 10^3/UL (ref 1.5–6.6)
NEUTROPHILS # SNV AUTO: 8.7 X10^3/UL (ref 4.8–10.8)
NEUTROPHILS NFR BLD AUTO: 60.6 %
NITRITE UR QL STRIP.AUTO: NEGATIVE
NRBC # BLD AUTO: 0 /100WBC
NRBC # BLD AUTO: 0 X10^3/UL
PDW BLD AUTO: 9.7 FL (ref 7.9–10.8)
PH UR STRIP.AUTO: 6 PH (ref 5–7.5)
PLATELET # BLD: 208 10^3/UL (ref 130–450)
POTASSIUM SERPL-SCNC: 3.6 MMOL/L (ref 3.5–5)
PROT SPEC-MCNC: 8.6 G/DL (ref 6.7–8.2)
PROT UR STRIP.AUTO-MCNC: NEGATIVE MG/DL
RBC # UR STRIP.AUTO: NEGATIVE /UL
RBC MAR: 5.01 10^6/UL (ref 4.2–5.4)
SODIUM SERPLBLD-SCNC: 137 MMOL/L (ref 135–145)
SP GR UR STRIP.AUTO: 1.01 (ref 1–1.03)
UROBILINOGEN UR QL STRIP.AUTO: (no result) E.U./DL
UROBILINOGEN UR STRIP.AUTO-MCNC: NEGATIVE MG/DL

## 2021-07-29 PROCEDURE — 36415 COLL VENOUS BLD VENIPUNCTURE: CPT

## 2021-07-29 PROCEDURE — 85025 COMPLETE CBC W/AUTO DIFF WBC: CPT

## 2021-07-29 PROCEDURE — 87086 URINE CULTURE/COLONY COUNT: CPT

## 2021-07-29 PROCEDURE — 81025 URINE PREGNANCY TEST: CPT

## 2021-07-29 PROCEDURE — 80053 COMPREHEN METABOLIC PANEL: CPT

## 2021-07-29 PROCEDURE — 84702 CHORIONIC GONADOTROPIN TEST: CPT

## 2021-07-29 PROCEDURE — 99284 EMERGENCY DEPT VISIT MOD MDM: CPT

## 2021-07-29 PROCEDURE — 81003 URINALYSIS AUTO W/O SCOPE: CPT

## 2021-07-29 PROCEDURE — 96360 HYDRATION IV INFUSION INIT: CPT

## 2021-07-29 PROCEDURE — 81001 URINALYSIS AUTO W/SCOPE: CPT

## 2021-07-29 PROCEDURE — 83690 ASSAY OF LIPASE: CPT

## 2021-07-29 NOTE — ULTRASOUND REPORT
PROCEDURE:  OB First Trimester w/TV

 

INDICATIONS:  pregnant, severe abd pain. Beta-hCG level is 1107.6

 

OUTSIDE/PRIOR DATING DATA:  

Last menstrual period (LMP): 6/29/2021.  

LMP-based estimated date of delivery ([place]): 4/5/2022.  

First dating scan (date and location): Not applicable

Estimated date of delivery ([place]) from first dating scan: Not applicable.  

 

 

TECHNIQUE:  

Real-time endovaginal scanning of the maternal pelvic organs, with image documentation.  

 

COMPARISON:  6/24/2021

 

FINDINGS:  No definite intrauterine pregnancy is identified. There is a endometrial cystic structure 
which may potentially represent a very early intrauterine pregnancy. No crown-rump length or heartbea
t. No extrauterine pregnancy is identified. 

 

Maternal organs:  Ovaries remote right oophorectomy. Left ovarian cyst. There is intraovarian ovarian
 flow by duplex.

 

IMPRESSION:  

Question very early intrauterine pregnancy. This is not definite. There is a small cystic structure w
hich cavity. No evidence of ectopic pregnancy.

 

Comment: Recommend serial beta hCGs and consideration of possible follow-up ultrasound in 1-2 weeks.

 

A preliminary report with the above findings was provided at the time of the study by [facility].

 

Reviewed by: Barron Bhandari MD on 7/29/2021 8:20 AM PDT

Approved by: Barron Bhandari MD on 7/29/2021 8:20 AM PDT

 

 

Station ID:  [id number]

## 2021-07-29 NOTE — ED PHYSICIAN DOCUMENTATION
History of Present Illness





- Stated complaint


Stated Complaint: ABD PX, BACK PX





- Chief complaint


Chief Complaint: Abd Pain





- History obtained from


History obtained from: Patient





- Additonal information


Additional information: 





35-year-old woman with past medical history of congenital nodule adrenal 

hyperplasia, CA-125 status post right ovarian cyst removal along with salpingo-

oophorectomy at [facility], presents with 3 days of suprapubic and 

bilateral pelvic pain radiating to the back, intermittent, intense, associated 

with pulling sensation with urination as well as nausea.  Patient denies urinary

symptoms like dysuria or hematuria.  LMP 6/29.  Patient has been having brown 

spotting since 7/25.  Blood type is A+. never been pregnant





Review of Systems


Ten Systems: 10 systems reviewed and negative


Constitutional: denies: Fever


Respiratory: denies: Dyspnea


GI: reports: Abdominal Pain, Nausea.  denies: Vomiting, Diarrhea


: denies: Dysuria, Hematuria


Musculoskeletal: reports: Back pain





PD PAST MEDICAL HISTORY





- Past Medical History


Past Medical History: Yes


Cardiovascular: None


Respiratory: None


Neuro: Headaches


Endocrine/Autoimmune: Other


GI: None


GYN: Ovarian cysts


: None


HEENT: None


Psych: Depression, Anxiety


Musculoskeletal: None


Derm: Rosacea





- Past Surgical History


Past Surgical History: Yes


General: Other


/GYN: Oophrectomy





- Present Medications


Home Medications: 


                                Ambulatory Orders











 Medication  Instructions  Recorded  Confirmed


 


Famotidine [Pepcid] 20 mg PO BID #60 tablet 10/28/20 


 


Ibuprofen [Motrin] 600 mg PO Q6H PRN #30 tab 12/23/20 


 


Pimecrolimus [Elidel] 30 gm TP 12/23/20 


 


DULoxetine [Cymbalta] 30 mg PO DAILY 07/29/21 07/29/21














- Allergies


Allergies/Adverse Reactions: 


                                    Allergies











Allergy/AdvReac Type Severity Reaction Status Date / Time


 


cephalexin Allergy  Hives Verified 07/29/21 01:56


 


ciprofloxacin [From Cipro] Allergy  Rash Verified 07/29/21 01:56


 


codeine Allergy  Hives Verified 07/29/21 01:56


 


fluconazole Allergy  Respiratory Verified 07/29/21 01:56


 


minocycline Allergy  Respiratory Verified 07/29/21 01:56


 


sulfamethoxazole Allergy  Respiratory Verified 07/29/21 01:56





[From Bactrim]     


 


Tetracyclines Allergy  Respiratory Verified 07/29/21 01:56


 


trimethoprim [From Bactrim] Allergy  Respiratory Verified 07/29/21 01:56


 


hydromorphone [From Dilaudid] AdvReac  Anxiety Verified 07/29/21 01:56


 


oxycodone AdvReac  Unknown Verified 07/29/21 01:56


 


bromopex AdvReac Severe problems Uncoded 06/24/21 17:21





   w/ airway  














- Social History


Does the pt smoke?: No


Smoking Status: Never smoker


Does the pt drink ETOH?: Yes


Does the pt have substance abuse?: No





- Immunizations


Immunizations are current?: Yes





- POLST


Patient has POLST: No





PD ED PE NORMAL





- Vitals


Vital signs reviewed: Yes





- General


General: Alert and oriented X 3, No acute distress, Well developed/nourished





- HEENT


HEENT: Atraumatic, PERRL, EOMI





- Neck


Neck: Supple, no meningeal sign





- Cardiac


Cardiac: RRR





- Respiratory


Respiratory: No respiratory distress, Clear bilaterally





- Abdomen


Abdomen: Non tender, Non distended, Other (Discomfort to suprapubic palpation)





- Back


Back: No CVA TTP





- Derm


Derm: Normal color, Warm and dry





- Extremities


Extremities: No deformity





- Neuro


Neuro: Alert and oriented X 3





- Psych


Psych: Normal mood, Normal affect





Results





- Vitals


Vitals: 


                               Vital Signs - 24 hr











  07/29/21 07/29/21 07/29/21





  01:47 01:55 03:55


 


Temperature 36.9 C 36.9 C 


 


Heart Rate 79 79 71


 


Respiratory 20 20 19





Rate   


 


Blood Pressure 116/79 116/79 118/78


 


O2 Saturation 100 100 99














  07/29/21





  05:00


 


Temperature 36.7 C


 


Heart Rate 75


 


Respiratory 18





Rate 


 


Blood Pressure 120/72


 


O2 Saturation 98








                                     Oxygen











O2 Source                      Room air

















- Labs


Labs: 


                                Laboratory Tests











  07/29/21 07/29/21 07/29/21





  02:10 02:10 04:15


 


WBC    8.7


 


RBC    5.01


 


Hgb    16.0


 


Hct    46.2


 


MCV    92.2


 


MCH    31.9 H


 


MCHC    34.6


 


RDW    12.0


 


Plt Count    208


 


MPV    9.7


 


Neut # (Auto)    5.3


 


Lymph # (Auto)    2.7


 


Mono # (Auto)    0.5


 


Eos # (Auto)    0.2


 


Baso # (Auto)    0.0


 


Absolute Nucleated RBC    0.00


 


Nucleated RBC %    0.0


 


Sodium   


 


Potassium   


 


Chloride   


 


Carbon Dioxide   


 


Anion Gap   


 


BUN   


 


Creatinine   


 


Estimated GFR (MDRD)   


 


Glucose   


 


Calcium   


 


Total Bilirubin   


 


AST   


 


ALT   


 


Alkaline Phosphatase   


 


Total Protein   


 


Albumin   


 


Globulin   


 


Albumin/Globulin Ratio   


 


Lipase   


 


HCG, Quant   


 


Urine Color  YELLOW  


 


Urine Clarity  CLEAR  


 


Urine pH  6.0  


 


Ur Specific Gravity  1.010  


 


Urine Protein  NEGATIVE  


 


Urine Glucose (UA)  NEGATIVE  


 


Urine Ketones  NEGATIVE  


 


Urine Occult Blood  NEGATIVE  


 


Urine Nitrite  NEGATIVE  


 


Urine Bilirubin  NEGATIVE  


 


Urine Urobilinogen  0.2 (NORMAL)  


 


Ur Leukocyte Esterase  NEGATIVE  


 


Ur Microscopic Review  NOT INDICATED  


 


Urine Culture Comments  NOT INDICATED  


 


Urine HCG, Qual   POSITIVE 














  07/29/21 07/29/21





  04:15 04:15


 


WBC  


 


RBC  


 


Hgb  


 


Hct  


 


MCV  


 


MCH  


 


MCHC  


 


RDW  


 


Plt Count  


 


MPV  


 


Neut # (Auto)  


 


Lymph # (Auto)  


 


Mono # (Auto)  


 


Eos # (Auto)  


 


Baso # (Auto)  


 


Absolute Nucleated RBC  


 


Nucleated RBC %  


 


Sodium  137 


 


Potassium  3.6 


 


Chloride  103 


 


Carbon Dioxide  25 


 


Anion Gap  9.0 


 


BUN  10 


 


Creatinine  0.7 


 


Estimated GFR (MDRD)  94 


 


Glucose  92 


 


Calcium  9.4 


 


Total Bilirubin  0.7 


 


AST  19 


 


ALT  19 


 


Alkaline Phosphatase  51 


 


Total Protein  8.6 H 


 


Albumin  4.9 


 


Globulin  3.7 


 


Albumin/Globulin Ratio  1.3 


 


Lipase  33 


 


HCG, Quant   1107.61


 


Urine Color  


 


Urine Clarity  


 


Urine pH  


 


Ur Specific Gravity  


 


Urine Protein  


 


Urine Glucose (UA)  


 


Urine Ketones  


 


Urine Occult Blood  


 


Urine Nitrite  


 


Urine Bilirubin  


 


Urine Urobilinogen  


 


Ur Leukocyte Esterase  


 


Ur Microscopic Review  


 


Urine Culture Comments  


 


Urine HCG, Qual  














PD MEDICAL DECISION MAKING





- ED course


ED course: 





35-year-old woman presents with 3 days of intermittent pelvic pain, found to 

have a positive pregnancy test.  U/s showing possible early IUP. d/w patient who

will plan to f/u with her ob gyn. strict return precautions given. 





Departure





- Departure


Disposition: 01 Home, Self Care


Clinical Impression: 


 Early stage of pregnancy





Condition: Good


Instructions:  Care Prenatal


Comments: 


You were seen in the emergency department for abdominal pain and found to be 

pregnany. please follow up with Dr. Ferrera and with your reproductive 

endocrine specialist ASAP. Return to the ED if you have any new or worsening 

symptoms or other concerns.

## 2021-08-16 ENCOUNTER — HOSPITAL ENCOUNTER (OUTPATIENT)
Dept: HOSPITAL 76 - DI | Age: 36
Discharge: HOME | End: 2021-08-16
Attending: OBSTETRICS & GYNECOLOGY
Payer: COMMERCIAL

## 2021-08-16 DIAGNOSIS — Z32.01: Primary | ICD-10-CM

## 2021-08-16 LAB
AMPHET UR QL SCN: NEGATIVE
BARBITURATES UR QL SCN>300 NG/ML: NEGATIVE
BENZODIAZ UR QL SCN: NEGATIVE
CLARITY UR REFRACT.AUTO: CLEAR
COCAINE UR-SCNC: NEGATIVE UMOL/L
GLUCOSE UR QL STRIP.AUTO: NEGATIVE MG/DL
KETONES UR QL STRIP.AUTO: NEGATIVE MG/DL
METHADONE UR QL SCN: NEGATIVE
METHAMPHET UR QL SCN: NEGATIVE
NITRITE UR QL STRIP.AUTO: NEGATIVE
OPIATES UR QL SCN: NEGATIVE
PH UR STRIP.AUTO: 6.5 PH (ref 5–7.5)
PROT UR STRIP.AUTO-MCNC: NEGATIVE MG/DL
RBC # UR STRIP.AUTO: NEGATIVE /UL
SP GR UR STRIP.AUTO: 1.01 (ref 1–1.03)
SQUAMOUS URNS QL MICRO: (no result)
THC UR QL SCN: NEGATIVE
UROBILINOGEN UR QL STRIP.AUTO: (no result) E.U./DL
UROBILINOGEN UR STRIP.AUTO-MCNC: NEGATIVE MG/DL
VOLATILE DRUGS POS SERPL SCN: (no result)
WBC # UR MANUAL: (no result) /HPF (ref 0–5)

## 2021-08-16 PROCEDURE — 87086 URINE CULTURE/COLONY COUNT: CPT

## 2021-08-16 PROCEDURE — 81001 URINALYSIS AUTO W/SCOPE: CPT

## 2021-08-16 PROCEDURE — 80306 DRUG TEST PRSMV INSTRMNT: CPT

## 2021-08-16 NOTE — ULTRASOUND REPORT
PROCEDURE:  OB First Trimester w/TV

 

INDICATIONS:  + PREG TEST

 

OUTSIDE/PRIOR DATING DATA:  

Last menstrual period (LMP): 6/29/2021.  

LMP-based estimated date of delivery ([place]): 4/5/2022.  

First dating scan (date and location): 8/16/2021.  

Estimated date of delivery ([place]) from first dating scan: 4/7/2022.  

The below data below was generated using the ultrasound [place] of 4/7/2022

 

TECHNIQUE:  

Real-time scanning was performed of the fetus and maternal pelvic organs, with image documentation.  
Endovaginal scanning was also performed to better visualize the fetus and maternal ovaries.  

 

COMPARISON:  None

 

FINDINGS:  

 

Embryo:  Single intrauterine pregnancy identified. Fetal pole identified. Crown-rump length measures 
0.7 cm corresponding to an estimated gestational age of 6 weeks 4 days.

Heart rate: 100 bpm.

 

Measurement variability in dating:  +/- 4 weeks by LMP, +/- 7 days by mean sac diameter (use before 6
 weeks gestation if crown-rump length not able to be measured), +/- 5 days by crown-rump length (6-12
 weeks gestation).  

 

Maternal organs: Corpus luteal cyst noted in the left ovary. Right ovary is surgically absent.. 

 

 

IMPRESSION:  

 

Single living intrauterine pregnancy with ultrasound estimated gestational age of 6 weeks 4 days kev
esponding to ultrasound [place] of 4/7/2022.

 

Reviewed by: Kacy Zuniga MD, PhD on 8/16/2021 9:07 PM PDT

Approved by: Kacy Zuniga MD, PhD on 8/16/2021 9:07 PM PDT

 

 

Station ID:  [id number]

## 2021-09-01 ENCOUNTER — HOSPITAL ENCOUNTER (OUTPATIENT)
Dept: HOSPITAL 76 - LAB.N | Age: 36
Discharge: HOME | End: 2021-09-01
Attending: NURSE PRACTITIONER
Payer: COMMERCIAL

## 2021-09-01 DIAGNOSIS — R35.0: Primary | ICD-10-CM

## 2021-09-01 LAB
CLARITY UR REFRACT.AUTO: CLEAR
GLUCOSE UR QL STRIP.AUTO: NEGATIVE MG/DL
KETONES UR QL STRIP.AUTO: NEGATIVE MG/DL
NITRITE UR QL STRIP.AUTO: NEGATIVE
PH UR STRIP.AUTO: 6 PH (ref 5–7.5)
PROT UR STRIP.AUTO-MCNC: NEGATIVE MG/DL
RBC # UR STRIP.AUTO: NEGATIVE /UL
SP GR UR STRIP.AUTO: 1.02 (ref 1–1.03)
UROBILINOGEN UR QL STRIP.AUTO: (no result) E.U./DL
UROBILINOGEN UR STRIP.AUTO-MCNC: NEGATIVE MG/DL

## 2021-09-01 PROCEDURE — 81001 URINALYSIS AUTO W/SCOPE: CPT

## 2021-09-01 PROCEDURE — 81003 URINALYSIS AUTO W/O SCOPE: CPT

## 2021-09-01 PROCEDURE — 87086 URINE CULTURE/COLONY COUNT: CPT

## 2021-09-23 ENCOUNTER — HOSPITAL ENCOUNTER (OUTPATIENT)
Dept: HOSPITAL 76 - LAB | Age: 36
Discharge: HOME | End: 2021-09-23
Attending: OBSTETRICS & GYNECOLOGY
Payer: COMMERCIAL

## 2021-09-23 ENCOUNTER — HOSPITAL ENCOUNTER (OUTPATIENT)
Dept: HOSPITAL 76 - DI | Age: 36
Discharge: HOME | End: 2021-09-23
Attending: OBSTETRICS & GYNECOLOGY
Payer: COMMERCIAL

## 2021-09-23 DIAGNOSIS — Z32.01: ICD-10-CM

## 2021-09-23 DIAGNOSIS — E88.81: ICD-10-CM

## 2021-09-23 DIAGNOSIS — O36.5910: ICD-10-CM

## 2021-09-23 DIAGNOSIS — E88.81: Primary | ICD-10-CM

## 2021-09-23 DIAGNOSIS — Z3A.01: ICD-10-CM

## 2021-09-23 DIAGNOSIS — E88.1: ICD-10-CM

## 2021-09-23 DIAGNOSIS — O20.8: ICD-10-CM

## 2021-09-23 DIAGNOSIS — O99.280: Primary | ICD-10-CM

## 2021-09-23 LAB
EST. AVERAGE GLUCOSE BLD GHB EST-MCNC: 94 MG/DL (ref 70–100)
HBA1C MFR BLD HPLC: 4.9 % (ref 4.27–6.07)

## 2021-09-23 PROCEDURE — 83036 HEMOGLOBIN GLYCOSYLATED A1C: CPT

## 2021-09-23 PROCEDURE — 84702 CHORIONIC GONADOTROPIN TEST: CPT

## 2021-09-23 PROCEDURE — 36415 COLL VENOUS BLD VENIPUNCTURE: CPT

## 2021-09-23 NOTE — ULTRASOUND REPORT
PROCEDURE:  OB First Trimester w/TV

 

INDICATIONS:  POSITIVE PREGNANCY TEST, DETERMINE VIABILITY

 

OUTSIDE/PRIOR DATING DATA:  

Last menstrual period (LMP): 6/29/2021.  

LMP-based estimated date of delivery ([place]): 4/5/2022.  

 

Estimated date of delivery ([place]) from first dating scan: 8/16/2021.  

The below data below was generated using the clinical provider [place] of 04/07/2022

 

TECHNIQUE:  

Real-time scanning was performed of the fetus and maternal pelvic organs, with image documentation.  
Endovaginal scanning was also performed to better visualize the fetus and maternal ovaries.  

 

COMPARISON:  8/16/2021

 

FINDINGS:     

 

Embryo:  Single intrauterine gestation measuring approximately 7 weeks and 0 days based off crown-rum
p length measurement of 0.9 cm. Yolk sac is visualized. Prominent subchorionic hemorrhage measuring 2
.2 x 1.8 x 3.2 cm in size.

Heart rate: No fetal cardiac activity visualized on today's examination.

 

Measurement variability in dating:  +/- 4 weeks by LMP, +/- 7 days by mean sac diameter (use before 6
 weeks gestation if crown-rump length not able to be measured), +/- 5 days by crown-rump length (6-12
 weeks gestation).  

 

Maternal organs: Status post right oophorectomy. Maternal cervical canal appears closed with small am
ount of fluid within the canal.

 

IMPRESSION:  

Single intrauterine gestation measuring approximately 7 weeks and 0 days based off crown-rump length 
measurement. Expected interval growth has not occurred when compared to prior study dated 8/16/2021. 
Fetal cardiac activity could not be visualized on today's examination. This is a new finding. Finding
s are consistent with early pregnancy failure.

 

Subchorionic hemorrhage is present.

 

Recommend continued clinical surveillance and follow-up imaging as needed.

 

Preliminary findings were reported to Dr. Ferrera by the sonographer at time of study completion.

 

Reviewed by: Robin Rushing MD on 9/23/2021 9:01 PM PDT

Approved by: Robin Rushing MD on 9/23/2021 9:01 PM PDT

 

 

Station ID:  [id number]

## 2021-09-24 ENCOUNTER — HOSPITAL ENCOUNTER (OUTPATIENT)
Dept: HOSPITAL 76 - LAB | Age: 36
Discharge: HOME | End: 2021-09-24
Attending: OBSTETRICS & GYNECOLOGY
Payer: COMMERCIAL

## 2021-09-24 DIAGNOSIS — Z34.00: Primary | ICD-10-CM

## 2021-09-24 LAB
C TRACH DNA SPEC NAA+PROBE-ACNC: NEGATIVE
N GONORRHOEA DNA GENITAL QL NAA+PROBE: NEGATIVE
T VAGINALIS RRNA GENITAL QL PROBE: NEGATIVE

## 2021-09-24 PROCEDURE — 87661 TRICHOMONAS VAGINALIS AMPLIF: CPT

## 2021-09-24 PROCEDURE — 87491 CHLMYD TRACH DNA AMP PROBE: CPT

## 2021-09-24 PROCEDURE — 87591 N.GONORRHOEAE DNA AMP PROB: CPT

## 2021-09-28 ENCOUNTER — HOSPITAL ENCOUNTER (OUTPATIENT)
Dept: HOSPITAL 76 - LAB.WCP | Age: 36
Discharge: HOME | End: 2021-09-28
Attending: OBSTETRICS & GYNECOLOGY
Payer: COMMERCIAL

## 2021-09-28 DIAGNOSIS — Z32.01: Primary | ICD-10-CM

## 2021-09-28 PROCEDURE — 86901 BLOOD TYPING SEROLOGIC RH(D): CPT

## 2021-09-28 PROCEDURE — 86900 BLOOD TYPING SEROLOGIC ABO: CPT

## 2021-10-05 ENCOUNTER — HOSPITAL ENCOUNTER (OUTPATIENT)
Dept: HOSPITAL 76 - EMS | Age: 36
Discharge: ANOTHER HOSPITAL | End: 2021-10-05
Payer: COMMERCIAL

## 2021-10-05 ENCOUNTER — HOSPITAL ENCOUNTER (EMERGENCY)
Dept: HOSPITAL 76 - ED | Age: 36
Discharge: HOME | End: 2021-10-05
Payer: COMMERCIAL

## 2021-10-05 VITALS — DIASTOLIC BLOOD PRESSURE: 71 MMHG | SYSTOLIC BLOOD PRESSURE: 113 MMHG

## 2021-10-05 DIAGNOSIS — R00.2: Primary | ICD-10-CM

## 2021-10-05 DIAGNOSIS — R07.9: Primary | ICD-10-CM

## 2021-10-05 DIAGNOSIS — R10.9: ICD-10-CM

## 2021-10-05 DIAGNOSIS — O03.6: ICD-10-CM

## 2021-10-05 LAB
ALBUMIN DIAFP-MCNC: 3.9 G/DL (ref 3.2–5.5)
ALBUMIN/GLOB SERPL: 1.3 {RATIO} (ref 1–2.2)
ALP SERPL-CCNC: 35 IU/L (ref 42–121)
ALT SERPL W P-5'-P-CCNC: 19 IU/L (ref 10–60)
ANION GAP SERPL CALCULATED.4IONS-SCNC: 8 MMOL/L (ref 6–13)
AST SERPL W P-5'-P-CCNC: 19 IU/L (ref 10–42)
BASOPHILS NFR BLD AUTO: 0.1 10^3/UL (ref 0–0.1)
BASOPHILS NFR BLD AUTO: 0.6 %
BILIRUB BLD-MCNC: 0.4 MG/DL (ref 0.2–1)
BUN SERPL-MCNC: 14 MG/DL (ref 6–20)
CALCIUM UR-MCNC: 9.6 MG/DL (ref 8.5–10.3)
CHLORIDE SERPL-SCNC: 106 MMOL/L (ref 101–111)
CO2 SERPL-SCNC: 26 MMOL/L (ref 21–32)
CREAT SERPLBLD-SCNC: 0.6 MG/DL (ref 0.4–1)
EOSINOPHIL # BLD AUTO: 0.3 10^3/UL (ref 0–0.7)
EOSINOPHIL NFR BLD AUTO: 3.5 %
ERYTHROCYTE [DISTWIDTH] IN BLOOD BY AUTOMATED COUNT: 11.7 % (ref 12–15)
GFRSERPLBLD MDRD-ARVRAT: 112 ML/MIN/{1.73_M2} (ref 89–?)
GLOBULIN SER-MCNC: 2.9 G/DL (ref 2.1–4.2)
GLUCOSE SERPL-MCNC: 96 MG/DL (ref 70–100)
HCT VFR BLD AUTO: 40.6 % (ref 37–47)
HGB UR QL STRIP: 14 G/DL (ref 12–16)
INR PPP: 1 (ref 0.8–1.2)
LIPASE SERPL-CCNC: 35 U/L (ref 22–51)
LYMPHOCYTES # SPEC AUTO: 2.6 10^3/UL (ref 1.5–3.5)
LYMPHOCYTES NFR BLD AUTO: 29.3 %
MCH RBC QN AUTO: 32 PG (ref 27–31)
MCHC RBC AUTO-ENTMCNC: 34.5 G/DL (ref 32–36)
MCV RBC AUTO: 92.9 FL (ref 81–99)
MONOCYTES # BLD AUTO: 0.7 10^3/UL (ref 0–1)
MONOCYTES NFR BLD AUTO: 8.2 %
NEUTROPHILS # BLD AUTO: 5.2 10^3/UL (ref 1.5–6.6)
NEUTROPHILS # SNV AUTO: 8.9 X10^3/UL (ref 4.8–10.8)
NEUTROPHILS NFR BLD AUTO: 58.2 %
NRBC # BLD AUTO: 0 /100WBC
NRBC # BLD AUTO: 0 X10^3/UL
PDW BLD AUTO: 10 FL (ref 7.9–10.8)
PLATELET # BLD: 189 10^3/UL (ref 130–450)
POTASSIUM SERPL-SCNC: 3.4 MMOL/L (ref 3.5–5)
PROT SPEC-MCNC: 6.8 G/DL (ref 6.7–8.2)
PROTHROM ACT/NOR PPP: 11 SECS (ref 9.9–12.6)
RBC MAR: 4.37 10^6/UL (ref 4.2–5.4)
SODIUM SERPLBLD-SCNC: 140 MMOL/L (ref 135–145)

## 2021-10-05 PROCEDURE — 80053 COMPREHEN METABOLIC PANEL: CPT

## 2021-10-05 PROCEDURE — 36415 COLL VENOUS BLD VENIPUNCTURE: CPT

## 2021-10-05 PROCEDURE — 85025 COMPLETE CBC W/AUTO DIFF WBC: CPT

## 2021-10-05 PROCEDURE — 85610 PROTHROMBIN TIME: CPT

## 2021-10-05 PROCEDURE — 83690 ASSAY OF LIPASE: CPT

## 2021-10-05 PROCEDURE — 99284 EMERGENCY DEPT VISIT MOD MDM: CPT

## 2021-10-05 PROCEDURE — 71045 X-RAY EXAM CHEST 1 VIEW: CPT

## 2021-10-05 PROCEDURE — 93005 ELECTROCARDIOGRAM TRACING: CPT

## 2021-10-05 PROCEDURE — 83880 ASSAY OF NATRIURETIC PEPTIDE: CPT

## 2021-10-05 PROCEDURE — 84484 ASSAY OF TROPONIN QUANT: CPT

## 2021-10-05 PROCEDURE — 96372 THER/PROPH/DIAG INJ SC/IM: CPT

## 2021-10-05 NOTE — XRAY REPORT
PROCEDURE:  Chest 1 View X-Ray

 

INDICATIONS:  Chest Pain

 

TECHNIQUE:  One view of the chest was acquired.  

 

COMPARISON:  10/28/2020

 

FINDINGS:  

 

Surgical changes and devices:  None.  

 

Lungs and pleura:  No pleural effusions or pneumothorax.  Lungs are clear.  

 

Mediastinum:  Mediastinal contours appear normal.  Heart size is normal.  

 

Bones and chest wall:  No suspicious bony lesions.  Overlying soft tissues appear unremarkable.  

 

IMPRESSION:  

No evidence acute pulmonary process.

 

Reviewed by: Barron Bhandari MD on 10/5/2021 11:14 PM PDT

Approved by: Barron Bhandari MD on 10/5/2021 11:14 PM PDT

 

 

Station ID:  [id number]

## 2021-10-05 NOTE — ED PHYSICIAN DOCUMENTATION
PD HPI CHEST PAIN





- Stated complaint


Stated Complaint: ABD PX/ CP





- Chief complaint


Chief Complaint: Cardiac





- History obtained from


History obtained from: Patient





- Additional information


Additional information: 





Pt comes to the ED with CC of palpitations and low abdominal cramping.  Pt was 

discovered to have a fetal demise a couple of weeks ago, and has been followed 

by OB.  She for now has been letting it go naturally, and just started having a 

little bleeding yesterday.  Pt states it has been stressful, and she felt as 

though her heart was racing.  She states her pulse climbed from the 60's to the 

90's, and she became worried.  No CP.  No SOB.  Pt has not had heavy bleeding.  

No N/V or other complaints at this time.





Review of Systems


Ten Systems: 10 systems reviewed and negative


Constitutional: reports: Reviewed and negative


Eyes: reports: Reviewed and negative


Ears: reports: Reviewed and negative


Nose: reports: Reviewed and negative


Throat: reports: Reviewed and negative


Cardiac: reports: Palpitations


Respiratory: reports: Reviewed and negative


GI: reports: Abdominal Pain


: reports: Reviewed and negative


Skin: reports: Reviewed and negative


Musculoskeletal: reports: Reviewed and negative


Neurologic: reports: Reviewed and negative


Psychiatric: reports: Reviewed and negative


Endocrine: reports: Reviewed and negative


Immunocompromised: reports: Reviewed and negative





PD PAST MEDICAL HISTORY





- Past Medical History


Cardiovascular: None


Respiratory: None


Neuro: Headaches


Endocrine/Autoimmune: Other


GI: None


GYN: Ovarian cysts


: None


HEENT: None


Psych: Depression, Anxiety


Musculoskeletal: None


Derm: Rosacea





- Past Surgical History


Past Surgical History: Yes


General: Other


/GYN: Oophrectomy





- Present Medications


Home Medications: 


                                Ambulatory Orders











 Medication  Instructions  Recorded  Confirmed


 


Famotidine [Pepcid] 20 mg PO BID #60 tablet 10/28/20 


 


Ibuprofen [Motrin] 600 mg PO Q6H PRN #30 tab 12/23/20 


 


Pimecrolimus [Elidel] 30 gm TP 12/23/20 


 


DULoxetine [Cymbalta] 30 mg PO DAILY 07/29/21 07/29/21














- Allergies


Allergies/Adverse Reactions: 


                                    Allergies











Allergy/AdvReac Type Severity Reaction Status Date / Time


 


bromhexine Allergy Severe problems Verified 10/05/21 22:02





   w/ airway  


 


cephalexin Allergy  Hives Verified 10/05/21 22:02


 


ciprofloxacin [From Cipro] Allergy  Rash Verified 10/05/21 22:02


 


codeine Allergy  Hives Verified 10/05/21 22:02


 


fluconazole Allergy  Respiratory Verified 10/05/21 22:02


 


minocycline Allergy  Respiratory Verified 10/05/21 22:02


 


sulfamethoxazole Allergy  Respiratory Verified 10/05/21 22:02





[From Bactrim]     


 


Tetracyclines Allergy  Respiratory Verified 10/05/21 22:02


 


trimethoprim [From Bactrim] Allergy  Respiratory Verified 10/05/21 22:02


 


hydromorphone [From Dilaudid] AdvReac  Anxiety Verified 10/05/21 22:02


 


oxycodone AdvReac  Unknown Verified 07/29/21 01:56














- Social History


Does the pt smoke?: No


Smoking Status: Never smoker


Does the pt drink ETOH?: Yes


Does the pt have substance abuse?: No





- Immunizations


Immunizations are current?: Yes





- POLST


Patient has POLST: No





PD ED PE NORMAL





- Vitals


Vital signs reviewed: Yes





- General


General: Alert and oriented X 3, No acute distress (Mildly anxious, tearful, but

otherwise NAD), Well developed/nourished





- HEENT


HEENT: Atraumatic, PERRL, EOMI, Moist mucous membranes





- Neck


Neck: Supple, no meningeal sign





- Cardiac


Cardiac: RRR, No murmur





- Respiratory


Respiratory: No respiratory distress, Clear bilaterally





- Abdomen


Abdomen: Soft, Non distended, Other (Mild diffuse low abd tenderness.)





- Derm


Derm: Warm and dry





- Extremities


Extremities: No deformity





- Neuro


Neuro: Alert and oriented X 3





- Psych


Psych: Normal mood, Normal affect





Results





- Vitals


Vitals: 


                               Vital Signs - 24 hr











  10/05/21





  23:00


 


Heart Rate 70


 


Respiratory 16





Rate 


 


Blood Pressure 113/71


 


O2 Saturation 100








                                     Oxygen











O2 Source                      Room air

















- Labs


Labs: 


                                Laboratory Tests











  10/05/21 10/05/21 10/05/21





  22:21 22:21 22:21


 


WBC  8.9  


 


RBC  4.37  


 


Hgb  14.0  


 


Hct  40.6  


 


MCV  92.9  


 


MCH  32.0 H  


 


MCHC  34.5  


 


RDW  11.7 L  


 


Plt Count  189  


 


MPV  10.0  


 


Neut # (Auto)  5.2  


 


Lymph # (Auto)  2.6  


 


Mono # (Auto)  0.7  


 


Eos # (Auto)  0.3  


 


Baso # (Auto)  0.1  


 


Absolute Nucleated RBC  0.00  


 


Nucleated RBC %  0.0  


 


PT   11.0 


 


INR   1.0 


 


Sodium    140


 


Potassium    3.4 L


 


Chloride    106


 


Carbon Dioxide    26


 


Anion Gap    8.0


 


BUN    14


 


Creatinine    0.6


 


Estimated GFR (MDRD)    112


 


Glucose    96


 


Calcium    9.6


 


Total Bilirubin    0.4


 


AST    19


 


ALT    19


 


Alkaline Phosphatase    35 L


 


Troponin I High Sens   


 


B-Natriuretic Peptide   


 


Total Protein    6.8


 


Albumin    3.9


 


Globulin    2.9


 


Albumin/Globulin Ratio    1.3


 


Lipase    35














  10/05/21 10/05/21





  22:21 22:21


 


WBC  


 


RBC  


 


Hgb  


 


Hct  


 


MCV  


 


MCH  


 


MCHC  


 


RDW  


 


Plt Count  


 


MPV  


 


Neut # (Auto)  


 


Lymph # (Auto)  


 


Mono # (Auto)  


 


Eos # (Auto)  


 


Baso # (Auto)  


 


Absolute Nucleated RBC  


 


Nucleated RBC %  


 


PT  


 


INR  


 


Sodium  


 


Potassium  


 


Chloride  


 


Carbon Dioxide  


 


Anion Gap  


 


BUN  


 


Creatinine  


 


Estimated GFR (MDRD)  


 


Glucose  


 


Calcium  


 


Total Bilirubin  


 


AST  


 


ALT  


 


Alkaline Phosphatase  


 


Troponin I High Sens  < 2.3 L 


 


B-Natriuretic Peptide   20


 


Total Protein  


 


Albumin  


 


Globulin  


 


Albumin/Globulin Ratio  


 


Lipase  














PD MEDICAL DECISION MAKING





- ED course


Complexity details: reviewed results, re-evaluated patient, considered 

differential, d/w patient


ED course: 





The pt was mildly emotionally distraught, but otherwise well-appearing.  Her HR 

was consistent in the low-70's during my history and physical, and regular.  

Labs were unremarkable.  The pt is already followed by OB, and is known by US to

have an IUFD.  No emergent imaging is indicated in the ED.  We have discussed 

the usual indications for return.  Pt is feeling better after IV fluids and 

Toradol and PO Ativan.





Departure





- Departure


Disposition: 01 Home, Self Care


Clinical Impression: 


 Palpitations





Condition: Stable


Instructions:  ED Palpitations


Comments: 


Your labs, EKG, and chest x-ray all look good.  Your heart rate and rhythm have 

been very stable here in the emergency department, and there is no evidence of a

dangerously high or irregular rhythm.  You have been hydrated with IV fluids.  

As you are going through the process of your miscarriage, it is important that 

you continue to drink plenty of fluids.  Please also continue following up with 

your OB for further care during your miscarriage.


Discharge Date/Time: 10/05/21 23:38

## 2021-10-13 ENCOUNTER — HOSPITAL ENCOUNTER (OUTPATIENT)
Dept: HOSPITAL 76 - LAB.WCP | Age: 36
Discharge: HOME | End: 2021-10-13
Attending: OBSTETRICS & GYNECOLOGY
Payer: COMMERCIAL

## 2021-10-13 DIAGNOSIS — O03.9: Primary | ICD-10-CM

## 2021-10-13 PROCEDURE — 84702 CHORIONIC GONADOTROPIN TEST: CPT

## 2021-10-13 PROCEDURE — 36415 COLL VENOUS BLD VENIPUNCTURE: CPT

## 2021-10-25 ENCOUNTER — HOSPITAL ENCOUNTER (OUTPATIENT)
Dept: HOSPITAL 76 - LAB.WCP | Age: 36
Discharge: HOME | End: 2021-10-25
Attending: OBSTETRICS & GYNECOLOGY
Payer: COMMERCIAL

## 2021-10-25 DIAGNOSIS — O03.9: Primary | ICD-10-CM

## 2021-10-25 PROCEDURE — 84702 CHORIONIC GONADOTROPIN TEST: CPT

## 2021-10-25 PROCEDURE — 36415 COLL VENOUS BLD VENIPUNCTURE: CPT

## 2021-10-27 ENCOUNTER — HOSPITAL ENCOUNTER (EMERGENCY)
Dept: HOSPITAL 76 - ED | Age: 36
LOS: 1 days | Discharge: HOME | End: 2021-10-28
Payer: COMMERCIAL

## 2021-10-27 DIAGNOSIS — O03.4: Primary | ICD-10-CM

## 2021-10-27 LAB
ALBUMIN DIAFP-MCNC: 4.1 G/DL (ref 3.2–5.5)
ALBUMIN/GLOB SERPL: 1.2 {RATIO} (ref 1–2.2)
ALP SERPL-CCNC: 45 IU/L (ref 42–121)
ALT SERPL W P-5'-P-CCNC: 19 IU/L (ref 10–60)
ANION GAP SERPL CALCULATED.4IONS-SCNC: 9 MMOL/L (ref 6–13)
AST SERPL W P-5'-P-CCNC: 19 IU/L (ref 10–42)
BASOPHILS NFR BLD AUTO: 0 10^3/UL (ref 0–0.1)
BASOPHILS NFR BLD AUTO: 0.4 %
BILIRUB BLD-MCNC: 0.6 MG/DL (ref 0.2–1)
BUN SERPL-MCNC: 13 MG/DL (ref 6–20)
CALCIUM UR-MCNC: 9.3 MG/DL (ref 8.5–10.3)
CHLORIDE SERPL-SCNC: 103 MMOL/L (ref 101–111)
CO2 SERPL-SCNC: 26 MMOL/L (ref 21–32)
CREAT SERPLBLD-SCNC: 0.6 MG/DL (ref 0.4–1)
EOSINOPHIL # BLD AUTO: 0.2 10^3/UL (ref 0–0.7)
EOSINOPHIL NFR BLD AUTO: 2.8 %
ERYTHROCYTE [DISTWIDTH] IN BLOOD BY AUTOMATED COUNT: 11.8 % (ref 12–15)
GFRSERPLBLD MDRD-ARVRAT: 112 ML/MIN/{1.73_M2} (ref 89–?)
GLOBULIN SER-MCNC: 3.3 G/DL (ref 2.1–4.2)
GLUCOSE SERPL-MCNC: 122 MG/DL (ref 70–100)
HCT VFR BLD AUTO: 42.8 % (ref 37–47)
HGB UR QL STRIP: 14.6 G/DL (ref 12–16)
LIPASE SERPL-CCNC: 36 U/L (ref 22–51)
LYMPHOCYTES # SPEC AUTO: 2 10^3/UL (ref 1.5–3.5)
LYMPHOCYTES NFR BLD AUTO: 25.7 %
MCH RBC QN AUTO: 31.9 PG (ref 27–31)
MCHC RBC AUTO-ENTMCNC: 34.1 G/DL (ref 32–36)
MCV RBC AUTO: 93.4 FL (ref 81–99)
MONOCYTES # BLD AUTO: 0.6 10^3/UL (ref 0–1)
MONOCYTES NFR BLD AUTO: 7.2 %
NEUTROPHILS # BLD AUTO: 4.9 10^3/UL (ref 1.5–6.6)
NEUTROPHILS # SNV AUTO: 7.7 X10^3/UL (ref 4.8–10.8)
NEUTROPHILS NFR BLD AUTO: 63.6 %
NRBC # BLD AUTO: 0 /100WBC
NRBC # BLD AUTO: 0 X10^3/UL
PDW BLD AUTO: 9.7 FL (ref 7.9–10.8)
PLATELET # BLD: 189 10^3/UL (ref 130–450)
POTASSIUM SERPL-SCNC: 3.7 MMOL/L (ref 3.5–5)
PROT SPEC-MCNC: 7.4 G/DL (ref 6.7–8.2)
RBC MAR: 4.58 10^6/UL (ref 4.2–5.4)
SODIUM SERPLBLD-SCNC: 138 MMOL/L (ref 135–145)

## 2021-10-27 PROCEDURE — 36415 COLL VENOUS BLD VENIPUNCTURE: CPT

## 2021-10-27 PROCEDURE — 93975 VASCULAR STUDY: CPT

## 2021-10-27 PROCEDURE — 99284 EMERGENCY DEPT VISIT MOD MDM: CPT

## 2021-10-27 PROCEDURE — 83690 ASSAY OF LIPASE: CPT

## 2021-10-27 PROCEDURE — 84702 CHORIONIC GONADOTROPIN TEST: CPT

## 2021-10-27 PROCEDURE — 80053 COMPREHEN METABOLIC PANEL: CPT

## 2021-10-27 PROCEDURE — 85025 COMPLETE CBC W/AUTO DIFF WBC: CPT

## 2021-10-27 NOTE — ULTRASOUND REPORT
PROCEDURE:  Pelvic w/Transvag+Doppler Comp

 

INDICATIONS:  Pelvic pain, heavy bleeding, s/p miscarriage

 

TECHNIQUE:  

Real-time scanning was performed of the pelvic organs, with image documentation.  Additional endovagi
nal scanning was necessary due to incomplete visualization of the adnexal and endometrial structures 
by transabdominal scanning.  

 

COMPARISON:  9/23/2021

 

FINDINGS:  

The uterine body measures 4.8 x 6.7 x 10.1 cm. There is a large amount of echogenic material within t
he uterine cavity. Also within the uterine cavity there is a yolk sac and adjacent embryo with a crow
n-rump length of 8 mm. Fetal cardiac activity is not identified. This is consistent with failed early
 pregnancy. The ovaries are normal. No free pelvic fluid.

 

IMPRESSION:  

Findings consistent with failed early pregnancy. Specifically, there is of embryo with a crown-rump l
ength of 8 mm demonstrating no cardiac activity, diagnostic for early failed pregnancy.

 

Reviewed by: Eitan Garsia MD on 10/27/2021 11:48 PM PDT

Approved by: Eitan Garsia MD on 10/27/2021 11:48 PM PDT

 

 

Station ID:  [id number]

## 2021-10-28 VITALS — SYSTOLIC BLOOD PRESSURE: 141 MMHG | DIASTOLIC BLOOD PRESSURE: 79 MMHG

## 2021-10-28 NOTE — ED PHYSICIAN DOCUMENTATION
PD HPI FEMALE 





- Stated complaint


Stated Complaint: FEM 





- Chief complaint


Chief Complaint: Abd Pain





- History obtained from


History obtained from: Patient





- Additional information


Additional information: 





Pt comes to the ED for CC of worsening vaginal bleeding/clotting today.  Pt was 

dx with IUFD about a month ago, and opted to let it go naturally.  She has been 

followed by Dr. Ferrera for this.  Pt states that over the past afternoon and 

evening, she has used about a pad every 1-2 hours.  She had some bleeding about 

a week ago, but is not sure if she passed any POC.  Bleeding seemed to asia, 

but started again today.  She states she felt slightly lightheaded and nauseated

earlier, but is better now.  No fevers.  No dysuria.  No other complaints.  She 

has had serial HCG's, which have steadily declined.  Her last one a couple of 

days ago was 1000, she states.





Review of Systems


Ten Systems: 10 systems reviewed and negative


Constitutional: reports: Reviewed and negative


Eyes: reports: Reviewed and negative


Ears: reports: Reviewed and negative


Nose: reports: Reviewed and negative


Throat: reports: Reviewed and negative


Cardiac: reports: Reviewed and negative


Respiratory: reports: Reviewed and negative


GI: reports: Reviewed and negative


: reports: Vaginal bleeding


Skin: reports: Reviewed and negative


Musculoskeletal: reports: Reviewed and negative


Neurologic: reports: Reviewed and negative


Psychiatric: reports: Reviewed and negative


Endocrine: reports: Reviewed and negative


Immunocompromised: reports: Reviewed and negative





PD PAST MEDICAL HISTORY





- Past Medical History


Past Medical History: Yes


Cardiovascular: None


Respiratory: None


Neuro: Headaches


Endocrine/Autoimmune: Other


GI: None


GYN: Ovarian cysts


: None


HEENT: None


Psych: Depression, Anxiety


Musculoskeletal: None


Derm: Rosacea


Other Past Medical History: Congenital Adrenal Hyperplasia; 21H Defficiency.





- Past Surgical History


Past Surgical History: Yes


General: Other


/GYN: Oophrectomy





- Present Medications


Home Medications: 


                                Ambulatory Orders











 Medication  Instructions  Recorded  Confirmed


 


Famotidine [Pepcid] 20 mg PO BID #60 tablet 10/28/20 


 


Ibuprofen [Motrin] 600 mg PO Q6H PRN #30 tab 12/23/20 


 


Pimecrolimus [Elidel] 30 gm TP 12/23/20 


 


DULoxetine [Cymbalta] 30 mg PO DAILY 07/29/21 07/29/21














- Allergies


Allergies/Adverse Reactions: 


                                    Allergies











Allergy/AdvReac Type Severity Reaction Status Date / Time


 


bromhexine Allergy Severe problems Verified 10/27/21 21:27





   w/ airway  


 


cephalexin Allergy  Hives Verified 10/27/21 21:27


 


ciprofloxacin [From Cipro] Allergy  Rash Verified 10/27/21 21:27


 


codeine Allergy  Hives Verified 10/27/21 21:27


 


fluconazole Allergy  Respiratory Verified 10/27/21 21:27


 


minocycline Allergy  Respiratory Verified 10/27/21 21:27


 


Sulfa (Sulfonamide Allergy  Unknown Verified 10/27/21 21:27





Antibiotics)     


 


sulfamethoxazole Allergy  Respiratory Verified 10/27/21 21:27





[From Bactrim]     


 


Tetracyclines Allergy  Respiratory Verified 10/27/21 21:27


 


trimethoprim [From Bactrim] Allergy  Respiratory Verified 10/27/21 21:27


 


hydromorphone [From Dilaudid] AdvReac  Anxiety Verified 10/27/21 21:27


 


oxycodone AdvReac  Unknown Verified 10/27/21 21:27














- Social History


Does the pt smoke?: No


Smoking Status: Never smoker


Does the pt drink ETOH?: Yes


Does the pt have substance abuse?: No





- Immunizations


Immunizations are current?: Yes





- POLST


Patient has POLST: No





PD ED PE NORMAL





- Vitals


Vital signs reviewed: Yes





- General


General: Alert and oriented X 3, No acute distress, Well developed/nourished





- HEENT


HEENT: Atraumatic, PERRL, EOMI, Moist mucous membranes





- Neck


Neck: Supple, no meningeal sign





- Cardiac


Cardiac: RRR, No murmur, Strong equal pulses





- Respiratory


Respiratory: No respiratory distress, Clear bilaterally





- Abdomen


Abdomen: Soft, Non tender, Non distended





- Female 


Female : Chaperone present, Other (Heavy vaginal bleeding, with moderate clots

and some POC in vaginal canal.  Unable to reach cervix with fingers--high.)





- Derm


Derm: Warm and dry





- Extremities


Extremities: No deformity





- Neuro


Neuro: Alert and oriented X 3





- Psych


Psych: Normal mood, Normal affect





Results





- Vitals


Vitals: 


                               Vital Signs - 24 hr











  10/27/21 10/27/21 10/28/21





  21:20 23:26 01:25


 


Temperature 36.9 C 36.9 C 


 


Heart Rate 81 74 74


 


Respiratory 21 16 14





Rate   


 


Blood Pressure 122/73 149/81 H 141/79 H


 


O2 Saturation 99 100 99








                                     Oxygen











O2 Source                      Room air

















- Labs


Labs: 


                                Laboratory Tests











  10/27/21 10/27/21 10/27/21





  21:33 21:33 21:33


 


WBC  7.7  


 


RBC  4.58  


 


Hgb  14.6  


 


Hct  42.8  


 


MCV  93.4  


 


MCH  31.9 H  


 


MCHC  34.1  


 


RDW  11.8 L  


 


Plt Count  189  


 


MPV  9.7  


 


Neut # (Auto)  4.9  


 


Lymph # (Auto)  2.0  


 


Mono # (Auto)  0.6  


 


Eos # (Auto)  0.2  


 


Baso # (Auto)  0.0  


 


Absolute Nucleated RBC  0.00  


 


Nucleated RBC %  0.0  


 


Sodium   138 


 


Potassium   3.7 


 


Chloride   103 


 


Carbon Dioxide   26 


 


Anion Gap   9.0 


 


BUN   13 


 


Creatinine   0.6 


 


Estimated GFR (MDRD)   112 


 


Glucose   122 H 


 


Calcium   9.3 


 


Total Bilirubin   0.6 


 


AST   19 


 


ALT   19 


 


Alkaline Phosphatase   45 


 


Total Protein   7.4 


 


Albumin   4.1 


 


Globulin   3.3 


 


Albumin/Globulin Ratio   1.2 


 


Lipase   36 


 


HCG, Quant    630.19














- Rads (name of study)


  ** US pelvis


Radiology: Prelim report reviewed (POC present, protruding into vaginal canal.  

Large amt echogenic material in uterus.  C/w failed pregnancy, active 

miscarriage.)





PD MEDICAL DECISION MAKING





- ED course


Complexity details: reviewed results, re-evaluated patient, considered 

differential, d/w patient


ED course: 





PT's CBC showed a normal hemoglobin, and pt was hemodynamically stable.  She was

actively miscarrying, as evidenced by the POC in her vaginal canal.  I d/w her 

that the process will likely complete on its own;  however, she should call Dr. Ferrera's office in the morning to touch base and schedule a follow-up.  Dr. Ferrera has called the pt personally in the ED tonight.  No indication for 

emergent intervention tonight.  We have discussed the usual indications for 

return.





Departure





- Departure


Disposition: 01 Home, Self Care


Clinical Impression: 


 Miscarriage





Condition: Stable


Instructions:  ED Miscarriage Incom


Comments: 


You were currently passing pregnancy products, and most likely actively 

completing the miscarriage at this time.  Your ultrasound also demonstrates 

this.  Once all the pregnancy products have been expelled from your uterus, the 

bleeding will subside.  Today, your blood levels look good.  Please call Dr. Ferrera's office first thing in the morning to see if there is anything else 

they would like you to do, or if they would like to follow-up with you in 

clinic.  Be sure to let them know that you talked to Dr. Maisha prince and 

that you were in the emergency department.  Continue to drink plenty of fluids 

and get rest.  You may use pads but please do not use tampons.


Discharge Date/Time: 10/28/21 01:58

## 2021-10-29 ENCOUNTER — HOSPITAL ENCOUNTER (OUTPATIENT)
Dept: HOSPITAL 76 - ED | Age: 36
Setting detail: OBSERVATION
LOS: 1 days | Discharge: HOME | End: 2021-10-30
Attending: OBSTETRICS & GYNECOLOGY | Admitting: OBSTETRICS & GYNECOLOGY
Payer: COMMERCIAL

## 2021-10-29 DIAGNOSIS — F41.9: ICD-10-CM

## 2021-10-29 DIAGNOSIS — F32.9: ICD-10-CM

## 2021-10-29 DIAGNOSIS — Z79.899: ICD-10-CM

## 2021-10-29 DIAGNOSIS — M54.9: ICD-10-CM

## 2021-10-29 DIAGNOSIS — F43.10: ICD-10-CM

## 2021-10-29 DIAGNOSIS — E28.2: ICD-10-CM

## 2021-10-29 DIAGNOSIS — Z79.1: ICD-10-CM

## 2021-10-29 DIAGNOSIS — J45.909: ICD-10-CM

## 2021-10-29 DIAGNOSIS — O03.1: Primary | ICD-10-CM

## 2021-10-29 DIAGNOSIS — Z87.891: ICD-10-CM

## 2021-10-29 DIAGNOSIS — E25.0: ICD-10-CM

## 2021-10-29 DIAGNOSIS — G89.29: ICD-10-CM

## 2021-10-29 DIAGNOSIS — Z20.822: ICD-10-CM

## 2021-10-29 LAB
ALBUMIN DIAFP-MCNC: 3.9 G/DL (ref 3.2–5.5)
ALBUMIN/GLOB SERPL: 1.2 {RATIO} (ref 1–2.2)
ALP SERPL-CCNC: 40 IU/L (ref 42–121)
ALT SERPL W P-5'-P-CCNC: 19 IU/L (ref 10–60)
ANION GAP SERPL CALCULATED.4IONS-SCNC: 8 MMOL/L (ref 6–13)
AST SERPL W P-5'-P-CCNC: 18 IU/L (ref 10–42)
B PARAPERT DNA SPEC QL NAA+PROBE: NOT DETECTED
B PERT DNA SPEC QL NAA+PROBE: NOT DETECTED
BASOPHILS NFR BLD AUTO: 0 10^3/UL (ref 0–0.1)
BASOPHILS NFR BLD AUTO: 0.4 %
BILIRUB BLD-MCNC: 0.7 MG/DL (ref 0.2–1)
BUN SERPL-MCNC: 13 MG/DL (ref 6–20)
C PNEUM DNA NPH QL NAA+NON-PROBE: NOT DETECTED
CALCIUM UR-MCNC: 9.4 MG/DL (ref 8.5–10.3)
CHLORIDE SERPL-SCNC: 105 MMOL/L (ref 101–111)
CLARITY UR REFRACT.AUTO: (no result)
CO2 SERPL-SCNC: 25 MMOL/L (ref 21–32)
CREAT SERPLBLD-SCNC: 0.7 MG/DL (ref 0.4–1)
EOSINOPHIL # BLD AUTO: 0.2 10^3/UL (ref 0–0.7)
EOSINOPHIL NFR BLD AUTO: 2.5 %
ERYTHROCYTE [DISTWIDTH] IN BLOOD BY AUTOMATED COUNT: 11.9 % (ref 12–15)
FLUAV RNA RESP QL NAA+PROBE: NOT DETECTED
GFRSERPLBLD MDRD-ARVRAT: 94 ML/MIN/{1.73_M2} (ref 89–?)
GLOBULIN SER-MCNC: 3.2 G/DL (ref 2.1–4.2)
GLUCOSE SERPL-MCNC: 118 MG/DL (ref 70–100)
GLUCOSE UR QL STRIP.AUTO: NEGATIVE MG/DL
HAEM INFLU B DNA SPEC QL NAA+PROBE: NOT DETECTED
HCOV 229E RNA SPEC QL NAA+PROBE: NOT DETECTED
HCOV HKU1 RNA UPPER RESP QL NAA+PROBE: NOT DETECTED
HCOV NL63 RNA ASPIRATE QL NAA+PROBE: NOT DETECTED
HCOV OC43 RNA SPEC QL NAA+PROBE: NOT DETECTED
HCT VFR BLD AUTO: 38 % (ref 37–47)
HGB UR QL STRIP: 13 G/DL (ref 12–16)
HMPV AG SPEC QL: NOT DETECTED
HPIV1 RNA NPH QL NAA+PROBE: NOT DETECTED
HPIV2 SPEC QL CULT: NOT DETECTED
HPIV3 AB TITR SER CF: NOT DETECTED {TITER}
HPIV4 RNA SPEC QL NAA+PROBE: NOT DETECTED
KETONES UR QL STRIP.AUTO: NEGATIVE MG/DL
LIPASE SERPL-CCNC: 37 U/L (ref 22–51)
LYMPHOCYTES # SPEC AUTO: 1.7 10^3/UL (ref 1.5–3.5)
LYMPHOCYTES NFR BLD AUTO: 21.5 %
M PNEUMO DNA SPEC QL NAA+PROBE: NOT DETECTED
MCH RBC QN AUTO: 32.2 PG (ref 27–31)
MCHC RBC AUTO-ENTMCNC: 34.2 G/DL (ref 32–36)
MCV RBC AUTO: 94.1 FL (ref 81–99)
MONOCYTES # BLD AUTO: 0.4 10^3/UL (ref 0–1)
MONOCYTES NFR BLD AUTO: 5.1 %
NEUTROPHILS # BLD AUTO: 5.6 10^3/UL (ref 1.5–6.6)
NEUTROPHILS # SNV AUTO: 7.9 X10^3/UL (ref 4.8–10.8)
NEUTROPHILS NFR BLD AUTO: 70.2 %
NITRITE UR QL STRIP.AUTO: POSITIVE
NRBC # BLD AUTO: 0 /100WBC
NRBC # BLD AUTO: 0 X10^3/UL
PDW BLD AUTO: 9.9 FL (ref 7.9–10.8)
PH UR STRIP.AUTO: 5.5 PH (ref 5–7.5)
PLATELET # BLD: 182 10^3/UL (ref 130–450)
POTASSIUM SERPL-SCNC: 3.9 MMOL/L (ref 3.5–5)
PROT SPEC-MCNC: 7.1 G/DL (ref 6.7–8.2)
PROT UR STRIP.AUTO-MCNC: 100 MG/DL
RBC # UR STRIP.AUTO: (no result) /UL
RBC # URNS HPF: (no result) /HPF (ref 0–5)
RBC MAR: 4.04 10^6/UL (ref 4.2–5.4)
RSV RNA RESP QL NAA+PROBE: NOT DETECTED
RV+EV RNA SPEC QL NAA+PROBE: NOT DETECTED
SARS-COV-2 RNA PNL SPEC NAA+PROBE: NOT DETECTED
SODIUM SERPLBLD-SCNC: 138 MMOL/L (ref 135–145)
SP GR UR STRIP.AUTO: 1.01 (ref 1–1.03)
SQUAMOUS URNS QL MICRO: (no result)
UROBILINOGEN UR QL STRIP.AUTO: (no result) E.U./DL
UROBILINOGEN UR STRIP.AUTO-MCNC: NEGATIVE MG/DL
WBC # UR MANUAL: (no result) /HPF (ref 0–5)

## 2021-10-29 PROCEDURE — 76817 TRANSVAGINAL US OBSTETRIC: CPT

## 2021-10-29 PROCEDURE — 81001 URINALYSIS AUTO W/SCOPE: CPT

## 2021-10-29 PROCEDURE — 59812 TREATMENT OF MISCARRIAGE: CPT

## 2021-10-29 PROCEDURE — 85025 COMPLETE CBC W/AUTO DIFF WBC: CPT

## 2021-10-29 PROCEDURE — 81003 URINALYSIS AUTO W/O SCOPE: CPT

## 2021-10-29 PROCEDURE — 83690 ASSAY OF LIPASE: CPT

## 2021-10-29 PROCEDURE — 87086 URINE CULTURE/COLONY COUNT: CPT

## 2021-10-29 PROCEDURE — 36415 COLL VENOUS BLD VENIPUNCTURE: CPT

## 2021-10-29 PROCEDURE — 76801 OB US < 14 WKS SINGLE FETUS: CPT

## 2021-10-29 PROCEDURE — 99285 EMERGENCY DEPT VISIT HI MDM: CPT

## 2021-10-29 PROCEDURE — 84702 CHORIONIC GONADOTROPIN TEST: CPT

## 2021-10-29 PROCEDURE — 80053 COMPREHEN METABOLIC PANEL: CPT

## 2021-10-29 PROCEDURE — 0202U NFCT DS 22 TRGT SARS-COV-2: CPT

## 2021-10-29 PROCEDURE — 99284 EMERGENCY DEPT VISIT MOD MDM: CPT

## 2021-10-29 RX ADMIN — SODIUM CHLORIDE, PRESERVATIVE FREE SCH ML: 5 INJECTION INTRAVENOUS at 19:41

## 2021-10-29 RX ADMIN — SODIUM CHLORIDE, POTASSIUM CHLORIDE, SODIUM LACTATE AND CALCIUM CHLORIDE SCH MLS/HR: 600; 310; 30; 20 INJECTION, SOLUTION INTRAVENOUS at 19:40

## 2021-10-29 RX ADMIN — SODIUM CHLORIDE, PRESERVATIVE FREE SCH: 5 INJECTION INTRAVENOUS at 23:43

## 2021-10-29 RX ADMIN — SODIUM CHLORIDE, PRESERVATIVE FREE SCH: 5 INJECTION INTRAVENOUS at 23:46
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
## 2021-10-29 NOTE — PROVIDER PROGRESS NOTE
Subjective





- Prog Note Date


Prog Note Date: 10/29/21


Prog Note Time: 16:53





- Subjective


Subjective: 





Reviewed us imaging


GS remains at cervix





Discussed options with patient. 





She is opting for observation overnight with misoprostol to aid in passing preg

amalia 


Otherwise scheduled for D&C at 8:30 am





Toradol and PCA for pain management








Clear diet and NPO after midnight





Admit to observation.





Objective





- Vital Signs/Intake & Output


Vital Signs: 


                                Vital Signs x48h











  Temp Pulse Resp BP Pulse Ox


 


 10/29/21 16:00   75  18  117/74  98


 


 10/29/21 14:13  99.5 F  88  17  112/67  100


 


 10/29/21 11:28  97.7 F  83  18  124/79  99














- Lab Results


Fish Bones: 


                                 10/29/21 11:55





                                 10/29/21 11:55


Other Labs: 


                               Lab Results x24hrs











  10/29/21 10/29/21 10/29/21 Range/Units





  14:10 13:10 11:55 


 


WBC     (4.8-10.8)  x10^3/uL


 


RBC     (4.20-5.40)  10^6/uL


 


Hgb     (12.0-16.0)  g/dL


 


Hct     (37.0-47.0)  %


 


MCV     (81.0-99.0)  fL


 


MCH     (27.0-31.0)  pg


 


MCHC     (32.0-36.0)  g/dL


 


RDW     (12.0-15.0)  %


 


Plt Count     (130-450)  10^3/uL


 


MPV     (7.9-10.8)  fL


 


Neut # (Auto)     (1.5-6.6)  10^3/uL


 


Lymph # (Auto)     (1.5-3.5)  10^3/uL


 


Mono # (Auto)     (0.0-1.0)  10^3/uL


 


Eos # (Auto)     (0.0-0.7)  10^3/uL


 


Baso # (Auto)     (0.0-0.1)  10^3/uL


 


Absolute Nucleated RBC     x10^3/uL


 


Nucleated RBC %     /100WBC


 


Sodium    138  (135-145)  mmol/L


 


Potassium    3.9  (3.5-5.0)  mmol/L


 


Chloride    105  (101-111)  mmol/L


 


Carbon Dioxide    25  (21-32)  mmol/L


 


Anion Gap    8.0  (6-13)  


 


BUN    13  (6-20)  mg/dL


 


Creatinine    0.7  (0.4-1.0)  mg/dL


 


Estimated GFR (MDRD)    94  (>89)  


 


Glucose    118 H  ()  mg/dL


 


Calcium    9.4  (8.5-10.3)  mg/dL


 


Total Bilirubin    0.7  (0.2-1.0)  mg/dL


 


AST    18  (10-42)  IU/L


 


ALT    19  (10-60)  IU/L


 


Alkaline Phosphatase    40 L  ()  IU/L


 


Total Protein    7.1  (6.7-8.2)  g/dL


 


Albumin    3.9  (3.2-5.5)  g/dL


 


Globulin    3.2  (2.1-4.2)  g/dL


 


Albumin/Globulin Ratio    1.2  (1.0-2.2)  


 


Lipase    37  (22-51)  U/L


 


HCG, Quant  418.21    mIU/mL


 


Urine Color   RED/BLOODY   


 


Urine Clarity   CLOUDY   (CLEAR)  


 


Urine pH   5.5   (5.0-7.5)  PH


 


Ur Specific Gravity   1.010   (1.002-1.030)  


 


Urine Protein   100 H   (NEGATIVE)  mg/dL


 


Urine Glucose (UA)   NEGATIVE   (NEGATIVE)  mg/dL


 


Urine Ketones   NEGATIVE   (NEGATIVE)  mg/dL


 


Urine Occult Blood   LARGE H   (NEGATIVE)  


 


Urine Nitrite   POSITIVE H   (NEGATIVE)  


 


Urine Bilirubin   NEGATIVE   (NEGATIVE)  


 


Urine Urobilinogen   0.2 (NORMAL)   (NORMAL)  E.U./dL


 


Ur Leukocyte Esterase   TRACE H   (NEGATIVE)  


 


Urine RBC   TNTC H   (0-5)  /HPF


 


Urine WBC   0-3   (0-5)  /HPF


 


Ur Squamous Epith Cells   NONE SEEN   (<= Few)  


 


Urine Bacteria   Rare   (None Seen)  /HPF


 


Ur Microscopic Review   INDICATED   


 


Urine Culture Comments   INDICATED   














  10/29/21 Range/Units





  11:55 


 


WBC  7.9  (4.8-10.8)  x10^3/uL


 


RBC  4.04 L  (4.20-5.40)  10^6/uL


 


Hgb  13.0  (12.0-16.0)  g/dL


 


Hct  38.0  (37.0-47.0)  %


 


MCV  94.1  (81.0-99.0)  fL


 


MCH  32.2 H  (27.0-31.0)  pg


 


MCHC  34.2  (32.0-36.0)  g/dL


 


RDW  11.9 L  (12.0-15.0)  %


 


Plt Count  182  (130-450)  10^3/uL


 


MPV  9.9  (7.9-10.8)  fL


 


Neut # (Auto)  5.6  (1.5-6.6)  10^3/uL


 


Lymph # (Auto)  1.7  (1.5-3.5)  10^3/uL


 


Mono # (Auto)  0.4  (0.0-1.0)  10^3/uL


 


Eos # (Auto)  0.2  (0.0-0.7)  10^3/uL


 


Baso # (Auto)  0.0  (0.0-0.1)  10^3/uL


 


Absolute Nucleated RBC  0.00  x10^3/uL


 


Nucleated RBC %  0.0  /100WBC


 


Sodium   (135-145)  mmol/L


 


Potassium   (3.5-5.0)  mmol/L


 


Chloride   (101-111)  mmol/L


 


Carbon Dioxide   (21-32)  mmol/L


 


Anion Gap   (6-13)  


 


BUN   (6-20)  mg/dL


 


Creatinine   (0.4-1.0)  mg/dL


 


Estimated GFR (MDRD)   (>89)  


 


Glucose   ()  mg/dL


 


Calcium   (8.5-10.3)  mg/dL


 


Total Bilirubin   (0.2-1.0)  mg/dL


 


AST   (10-42)  IU/L


 


ALT   (10-60)  IU/L


 


Alkaline Phosphatase   ()  IU/L


 


Total Protein   (6.7-8.2)  g/dL


 


Albumin   (3.2-5.5)  g/dL


 


Globulin   (2.1-4.2)  g/dL


 


Albumin/Globulin Ratio   (1.0-2.2)  


 


Lipase   (22-51)  U/L


 


HCG, Quant   mIU/mL


 


Urine Color   


 


Urine Clarity   (CLEAR)  


 


Urine pH   (5.0-7.5)  PH


 


Ur Specific Gravity   (1.002-1.030)  


 


Urine Protein   (NEGATIVE)  mg/dL


 


Urine Glucose (UA)   (NEGATIVE)  mg/dL


 


Urine Ketones   (NEGATIVE)  mg/dL


 


Urine Occult Blood   (NEGATIVE)  


 


Urine Nitrite   (NEGATIVE)  


 


Urine Bilirubin   (NEGATIVE)  


 


Urine Urobilinogen   (NORMAL)  E.U./dL


 


Ur Leukocyte Esterase   (NEGATIVE)  


 


Urine RBC   (0-5)  /HPF


 


Urine WBC   (0-5)  /HPF


 


Ur Squamous Epith Cells   (<= Few)  


 


Urine Bacteria   (None Seen)  /HPF


 


Ur Microscopic Review   


 


Urine Culture Comments

## 2021-10-29 NOTE — ULTRASOUND REPORT
PROCEDURE:  OB First Trimester w/TV

 

INDICATIONS:  Retained products of conception

 

OUTSIDE/PRIOR DATING DATA:  

Last menstrual period (LMP): 2021.  

LMP-based estimated date of delivery ([place]): 2022.  

First dating scan (date and location): 2021.  

Estimated date of delivery ([place]) from first dating scan: 2022.  

 

 

TECHNIQUE:  

Real-time scanning was performed of the fetus and maternal pelvic organs, with image documentation.  
Endovaginal scanning was also performed to better visualize the fetus and maternal ovaries.  

 

COMPARISON:  OB ultrasound 10/27/2021

 

FINDINGS:     

 

Crown-rump length measures 1.0 cm corresponding to 7 weeks 0 days. A gestational sac containing yolk 
sac and crown-rump length without identifiable fetal heart tones is noted within the vaginal vault. T
he endometrium within the uterus is irregular with areas of increased vascularity. Measurement variab
ility in dating:  +/- 4 weeks by LMP, +/- 7 days by mean sac diameter (use before 6 weeks gestation i
f crown-rump length not able to be measured), +/- 5 days by crown-rump length (6-12 weeks gestation).
  

 

Maternal organs: Right ovary has been removed. Left adnexa is within normal limits.

 

IMPRESSION:  

Fetal pole or gestational sac identified in the vaginal vault most suggestive of  in progress
. Endometrium is heterogeneous with increased vascularity. While this could be reflective of inflamma
tion, areas of retained products of conception should be considered and short interval imaging follow
-up is recommended.

 

Reviewed by: Rose Calzada MD on 10/29/2021 4:24 PM PDT

Approved by: Rose Calzada MD on 10/29/2021 4:24 PM PDT

 

 

Station ID:  [id number]

## 2021-10-29 NOTE — ED PHYSICIAN DOCUMENTATION
PD HPI FEMALE 





- Stated complaint


Stated Complaint: FEMALE 





- Chief complaint


Chief Complaint: Abd Pain





- History obtained from


History obtained from: Patient





- History of Present Illness


Timing - onset: How many weeks ago (5)


Timing - duration: Weeks (5)


Timing - details: Abrupt onset, Still present


Associated symptoms: Pelvic pain, Vaginal bleeding


Contributing factors: Other (miscarriage)


OB-GYN History: G (1), P (0), Miscarriage(s) (1)


Similar symptoms before: Has not had sx before


Recently seen: Clinic, Emergency Dept





- Additional information


Additional information: 





36-year-old female with a confirmed pregnancy in August of this year had a 

formal ultrasound done on  which demonstrated a nonviable 7-week 

fetus.  She has had persistence of the presence of a yolk sac and an fetal pole 

since then and she has had a drop in her quantitative hCG from 35,000 down to 

400 today.  She is been seen here in the emergency department and in the women's

clinic multiple times since this miscarriage and she has been vacillating on 

attempting any more aggressive measures.  Today she feels that it is time to do 

something.  She has more bleeding and the pain is especially bad.





Review of Systems


Constitutional: denies: Fever


Respiratory: denies: Cough


GI: denies: Vomiting, Diarrhea


: denies: Dysuria


Skin: denies: Rash


Musculoskeletal: denies: Neck pain, Back pain, Extremity pain


Neurologic: denies: Focal weakness, Numbness





PD PAST MEDICAL HISTORY





- Past Medical History


Cardiovascular: None


Respiratory: None


Neuro: Headaches


Endocrine/Autoimmune: Other


GI: None


GYN: Ovarian cysts


: None


HEENT: None


Psych: Depression, Anxiety


Musculoskeletal: None


Derm: Rosacea





- Past Surgical History


Past Surgical History: Yes


General: Other


/GYN: Oophrectomy





- Present Medications


Home Medications: 


                                Ambulatory Orders











 Medication  Instructions  Recorded  Confirmed


 


Famotidine [Pepcid] 20 mg PO BID #60 tablet 10/28/20 


 


Ibuprofen [Motrin] 600 mg PO Q6H PRN #30 tab 20 


 


Pimecrolimus [Elidel] 30 gm TP 20 


 


DULoxetine [Cymbalta] 30 mg PO DAILY 21














- Allergies


Allergies/Adverse Reactions: 


                                    Allergies











Allergy/AdvReac Type Severity Reaction Status Date / Time


 


bromhexine Allergy Severe problems Verified 10/29/21 11:28





   w/ airway  


 


cephalexin Allergy  Hives Verified 10/29/21 11:28


 


ciprofloxacin [From Cipro] Allergy  Rash Verified 10/29/21 11:28


 


codeine Allergy  Hives Verified 10/29/21 11:28


 


fluconazole Allergy  Respiratory Verified 10/29/21 11:28


 


minocycline Allergy  Respiratory Verified 10/29/21 11:28


 


Sulfa (Sulfonamide Allergy  Unknown Verified 10/29/21 11:28





Antibiotics)     


 


sulfamethoxazole Allergy  Respiratory Verified 10/29/21 11:28





[From Bactrim]     


 


Tetracyclines Allergy  Respiratory Verified 10/29/21 11:28


 


trimethoprim [From Bactrim] Allergy  Respiratory Verified 10/29/21 11:28


 


hydromorphone [From Dilaudid] AdvReac  Anxiety Verified 10/29/21 11:28


 


oxycodone AdvReac  Unknown Verified 10/29/21 11:28














- Social History


Does the pt smoke?: No


Smoking Status: Never smoker


Does the pt drink ETOH?: Yes


Does the pt have substance abuse?: No





- Immunizations


Immunizations are current?: Yes





- POLST


Patient has POLST: No





PD ED PE NORMAL





- Vitals


Vital signs reviewed: Yes (normal )





- General


General: Alert and oriented X 3, No acute distress, Well developed/nourished





- HEENT


HEENT: Atraumatic, PERRL, EOMI





- Neck


Neck: Supple, no meningeal sign, No bony TTP





- Cardiac


Cardiac: RRR, No murmur





- Respiratory


Respiratory: No respiratory distress, Clear bilaterally





- Abdomen


Abdomen: Normal bowel sounds, Soft, Non distended, No organomegaly, Other (mild 

suprapubic tenderness)





- Back


Back: No CVA TTP, No spinal TTP





- Derm


Derm: Normal color, Warm and dry, No rash





- Extremities


Extremities: No deformity, No edema





- Neuro


Neuro: Alert and oriented X 3, CNs 2-12 intact, No motor deficit, No sensory 

deficit, Normal speech


Eye Opening: Spontaneous


Motor: Obeys Commands


Verbal: Oriented


GCS Score: 15





- Psych


Psych: Normal mood, Normal affect





Results





- Vitals


Vitals: 


                               Vital Signs - 24 hr











  10/29/21 10/29/21 10/29/21





  11:28 14:13 16:00


 


Temperature 36.5 C 37.5 C 


 


Heart Rate 83 88 75


 


Respiratory 18 17 18





Rate   


 


Blood Pressure 124/79 112/67 117/74


 


O2 Saturation 99 100 98








                                     Oxygen











O2 Source                      Room air

















- Labs


Labs: 


                                Laboratory Tests











  10/29/21 10/29/21 10/29/21





  11:55 11:55 13:10


 


WBC  7.9  


 


RBC  4.04 L  


 


Hgb  13.0  


 


Hct  38.0  


 


MCV  94.1  


 


MCH  32.2 H  


 


MCHC  34.2  


 


RDW  11.9 L  


 


Plt Count  182  


 


MPV  9.9  


 


Neut # (Auto)  5.6  


 


Lymph # (Auto)  1.7  


 


Mono # (Auto)  0.4  


 


Eos # (Auto)  0.2  


 


Baso # (Auto)  0.0  


 


Absolute Nucleated RBC  0.00  


 


Nucleated RBC %  0.0  


 


Sodium   138 


 


Potassium   3.9 


 


Chloride   105 


 


Carbon Dioxide   25 


 


Anion Gap   8.0 


 


BUN   13 


 


Creatinine   0.7 


 


Estimated GFR (MDRD)   94 


 


Glucose   118 H 


 


Calcium   9.4 


 


Total Bilirubin   0.7 


 


AST   18 


 


ALT   19 


 


Alkaline Phosphatase   40 L 


 


Total Protein   7.1 


 


Albumin   3.9 


 


Globulin   3.2 


 


Albumin/Globulin Ratio   1.2 


 


Lipase   37 


 


HCG, Quant   


 


Urine Color    RED/BLOODY


 


Urine Clarity    CLOUDY


 


Urine pH    5.5


 


Ur Specific Gravity    1.010


 


Urine Protein    100 H


 


Urine Glucose (UA)    NEGATIVE


 


Urine Ketones    NEGATIVE


 


Urine Occult Blood    LARGE H


 


Urine Nitrite    POSITIVE H


 


Urine Bilirubin    NEGATIVE


 


Urine Urobilinogen    0.2 (NORMAL)


 


Ur Leukocyte Esterase    TRACE H


 


Urine RBC    TNTC H


 


Urine WBC    0-3


 


Ur Squamous Epith Cells    NONE SEEN


 


Urine Bacteria    Rare


 


Ur Microscopic Review    INDICATED


 


Urine Culture Comments    INDICATED














  10/29/21





  14:10


 


WBC 


 


RBC 


 


Hgb 


 


Hct 


 


MCV 


 


MCH 


 


MCHC 


 


RDW 


 


Plt Count 


 


MPV 


 


Neut # (Auto) 


 


Lymph # (Auto) 


 


Mono # (Auto) 


 


Eos # (Auto) 


 


Baso # (Auto) 


 


Absolute Nucleated RBC 


 


Nucleated RBC % 


 


Sodium 


 


Potassium 


 


Chloride 


 


Carbon Dioxide 


 


Anion Gap 


 


BUN 


 


Creatinine 


 


Estimated GFR (MDRD) 


 


Glucose 


 


Calcium 


 


Total Bilirubin 


 


AST 


 


ALT 


 


Alkaline Phosphatase 


 


Total Protein 


 


Albumin 


 


Globulin 


 


Albumin/Globulin Ratio 


 


Lipase 


 


HCG, Quant  418.21


 


Urine Color 


 


Urine Clarity 


 


Urine pH 


 


Ur Specific Gravity 


 


Urine Protein 


 


Urine Glucose (UA) 


 


Urine Ketones 


 


Urine Occult Blood 


 


Urine Nitrite 


 


Urine Bilirubin 


 


Urine Urobilinogen 


 


Ur Leukocyte Esterase 


 


Urine RBC 


 


Urine WBC 


 


Ur Squamous Epith Cells 


 


Urine Bacteria 


 


Ur Microscopic Review 


 


Urine Culture Comments 














- Rads (name of study)


  ** u/s pelvis


Radiology: Prelim report reviewed (Impression: Fetal pole or gestational sac 

identified in the vaginal vault most suggestive of  in progress.  

Endometrium is heterogeneous with increased vascularity.  While this could be 

reflective of inflammation, areas of retained products of conception should be 

considered in short interva), EMP read indepedently, See rad report





PD MEDICAL DECISION MAKING





- ED course


Complexity details: reviewed old records, reviewed results, re-evaluated 

patient, considered differential, d/w patient


ED course: 





36-year-old female who began having miscarriage at the beginning of September 

has had a decreasing quantitative hCG and retained products of conception on her

ultrasound. She has come back to the emergency department for episodic bleeding 

and she has come into the office for reevaluation and she has been wanting to 

manage this conservatively. She has now returned with persistent bleeding and 

pelvic pain and a repeat ultrasound today shows the retained products of c

onception just at the inside of the cervix. Dr. Ferrera has been consulted in 

the case and she has evaluated the patient will take her to the operating room 

likely tomorrow.





Departure





- Departure


Disposition: ED Place in Observation


Clinical Impression: 


 Incomplete 





Condition: Stable


Discharge Date/Time: 10/29/21 17:40
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
## 2021-10-30 VITALS — DIASTOLIC BLOOD PRESSURE: 67 MMHG | SYSTOLIC BLOOD PRESSURE: 109 MMHG

## 2021-10-30 PROCEDURE — 10D17ZZ EXTRACTION OF PRODUCTS OF CONCEPTION, RETAINED, VIA NATURAL OR ARTIFICIAL OPENING: ICD-10-PCS | Performed by: OBSTETRICS & GYNECOLOGY

## 2021-10-30 RX ADMIN — SODIUM CHLORIDE, POTASSIUM CHLORIDE, SODIUM LACTATE AND CALCIUM CHLORIDE SCH MLS/HR: 600; 310; 30; 20 INJECTION, SOLUTION INTRAVENOUS at 03:16

## 2021-10-30 NOTE — PROVIDER PROGRESS NOTE
Subjective





- Prog Note Date


Prog Note Date: 10/30/21


Prog Note Time: 09:15





- Subjective


Subjective: 





Patient received 800 mcg miso BC x1 overnight. Passed large clots, unclear if GS

was present in clots. No pain or heavy VB this am. Has some tightness in hands 

and feet 2/2 mild swelling





Objective





- Vital Signs/Intake & Output


Reviewed Vital Signs: Yes


Vital Signs: 


                                Vital Signs x48h











  Temp Pulse Resp BP Pulse Ox


 


 10/30/21 05:34  97.5 F L  64  16  116/64  99











Intake & Output: 


                                 Intake & Output











 10/27/21 10/28/21 10/29/21 10/30/21





 23:59 23:59 23:59 23:59


 


Intake Total   740 950


 


Balance   740 950














- Objective


General Appearance: positive: No acute distress


Respiratory: positive: No respiratory distress


Cardiovascular: positive: Other (RR)


Abdomen: positive: Non-tender, No distention


Skin: positive: Color nml


Extremities: positive: Other (mild swelling in BLE and BUE)





- Lab Results


Fish Bones: 


                                 10/29/21 11:55





                                 10/29/21 11:55


Other Labs: 


                               Lab Results x24hrs











  10/29/21 10/29/21 10/29/21 Range/Units





  17:35 14:10 13:10 


 


WBC     (4.8-10.8)  x10^3/uL


 


RBC     (4.20-5.40)  10^6/uL


 


Hgb     (12.0-16.0)  g/dL


 


Hct     (37.0-47.0)  %


 


MCV     (81.0-99.0)  fL


 


MCH     (27.0-31.0)  pg


 


MCHC     (32.0-36.0)  g/dL


 


RDW     (12.0-15.0)  %


 


Plt Count     (130-450)  10^3/uL


 


MPV     (7.9-10.8)  fL


 


Neut # (Auto)     (1.5-6.6)  10^3/uL


 


Lymph # (Auto)     (1.5-3.5)  10^3/uL


 


Mono # (Auto)     (0.0-1.0)  10^3/uL


 


Eos # (Auto)     (0.0-0.7)  10^3/uL


 


Baso # (Auto)     (0.0-0.1)  10^3/uL


 


Absolute Nucleated RBC     x10^3/uL


 


Nucleated RBC %     /100WBC


 


Sodium     (135-145)  mmol/L


 


Potassium     (3.5-5.0)  mmol/L


 


Chloride     (101-111)  mmol/L


 


Carbon Dioxide     (21-32)  mmol/L


 


Anion Gap     (6-13)  


 


BUN     (6-20)  mg/dL


 


Creatinine     (0.4-1.0)  mg/dL


 


Estimated GFR (MDRD)     (>89)  


 


Glucose     ()  mg/dL


 


Calcium     (8.5-10.3)  mg/dL


 


Total Bilirubin     (0.2-1.0)  mg/dL


 


AST     (10-42)  IU/L


 


ALT     (10-60)  IU/L


 


Alkaline Phosphatase     ()  IU/L


 


Total Protein     (6.7-8.2)  g/dL


 


Albumin     (3.2-5.5)  g/dL


 


Globulin     (2.1-4.2)  g/dL


 


Albumin/Globulin Ratio     (1.0-2.2)  


 


Lipase     (22-51)  U/L


 


HCG, Quant   418.21   mIU/mL


 


Urine Color    RED/BLOODY  


 


Urine Clarity    CLOUDY  (CLEAR)  


 


Urine pH    5.5  (5.0-7.5)  PH


 


Ur Specific Gravity    1.010  (1.002-1.030)  


 


Urine Protein    100 H  (NEGATIVE)  mg/dL


 


Urine Glucose (UA)    NEGATIVE  (NEGATIVE)  mg/dL


 


Urine Ketones    NEGATIVE  (NEGATIVE)  mg/dL


 


Urine Occult Blood    LARGE H  (NEGATIVE)  


 


Urine Nitrite    POSITIVE H  (NEGATIVE)  


 


Urine Bilirubin    NEGATIVE  (NEGATIVE)  


 


Urine Urobilinogen    0.2 (NORMAL)  (NORMAL)  E.U./dL


 


Ur Leukocyte Esterase    TRACE H  (NEGATIVE)  


 


Urine RBC    TNTC H  (0-5)  /HPF


 


Urine WBC    0-3  (0-5)  /HPF


 


Ur Squamous Epith Cells    NONE SEEN  (<= Few)  


 


Urine Bacteria    Rare  (None Seen)  /HPF


 


Ur Microscopic Review    INDICATED  


 


Urine Culture Comments    INDICATED  


 


Nasal Adenovirus (PCR)  NOT DETECTED    


 


Nasal B. parapertussis DNA (PCR)  NOT DETECTED    


 


Nasal Coronavir 229E PCR  NOT DETECTED    


 


Nasal Coronavir HKU1 PCR  NOT DETECTED    


 


Nasal Coronavir NL63 PCR  NOT DETECTED    


 


Nasal Coronavir OC43 PCR  NOT DETECTED    


 


Nasal Enterovir/Rhinovir PCR  NOT DETECTED    


 


Nasal Influenza B PCR  NOT DETECTED    


 


Nasal Influenza A PCR  NOT DETECTED    


 


Nasal Parainfluen 1 PCR  NOT DETECTED    


 


Nasal Parainfluen 2 PCR  NOT DETECTED    


 


Nasal Parainfluen 3 PCR  NOT DETECTED    


 


Nasal Parainfluen 4 PCR  NOT DETECTED    


 


Nasal RSV (PCR)  NOT DETECTED    


 


Nasal B.pertussis DNA PCR  NOT DETECTED    


 


Nasal C.pneumoniae (PCR)  NOT DETECTED    


 


Sanchez Human Metapneumo PCR  NOT DETECTED    


 


Nasal M.pneumoniae (PCR)  NOT DETECTED    


 


Nasal SARS-CoV-2 (PCR)  NOT DETECTED    














  10/29/21 10/29/21 Range/Units





  11:55 11:55 


 


WBC   7.9  (4.8-10.8)  x10^3/uL


 


RBC   4.04 L  (4.20-5.40)  10^6/uL


 


Hgb   13.0  (12.0-16.0)  g/dL


 


Hct   38.0  (37.0-47.0)  %


 


MCV   94.1  (81.0-99.0)  fL


 


MCH   32.2 H  (27.0-31.0)  pg


 


MCHC   34.2  (32.0-36.0)  g/dL


 


RDW   11.9 L  (12.0-15.0)  %


 


Plt Count   182  (130-450)  10^3/uL


 


MPV   9.9  (7.9-10.8)  fL


 


Neut # (Auto)   5.6  (1.5-6.6)  10^3/uL


 


Lymph # (Auto)   1.7  (1.5-3.5)  10^3/uL


 


Mono # (Auto)   0.4  (0.0-1.0)  10^3/uL


 


Eos # (Auto)   0.2  (0.0-0.7)  10^3/uL


 


Baso # (Auto)   0.0  (0.0-0.1)  10^3/uL


 


Absolute Nucleated RBC   0.00  x10^3/uL


 


Nucleated RBC %   0.0  /100WBC


 


Sodium  138   (135-145)  mmol/L


 


Potassium  3.9   (3.5-5.0)  mmol/L


 


Chloride  105   (101-111)  mmol/L


 


Carbon Dioxide  25   (21-32)  mmol/L


 


Anion Gap  8.0   (6-13)  


 


BUN  13   (6-20)  mg/dL


 


Creatinine  0.7   (0.4-1.0)  mg/dL


 


Estimated GFR (MDRD)  94   (>89)  


 


Glucose  118 H   ()  mg/dL


 


Calcium  9.4   (8.5-10.3)  mg/dL


 


Total Bilirubin  0.7   (0.2-1.0)  mg/dL


 


AST  18   (10-42)  IU/L


 


ALT  19   (10-60)  IU/L


 


Alkaline Phosphatase  40 L   ()  IU/L


 


Total Protein  7.1   (6.7-8.2)  g/dL


 


Albumin  3.9   (3.2-5.5)  g/dL


 


Globulin  3.2   (2.1-4.2)  g/dL


 


Albumin/Globulin Ratio  1.2   (1.0-2.2)  


 


Lipase  37   (22-51)  U/L


 


HCG, Quant    mIU/mL


 


Urine Color    


 


Urine Clarity    (CLEAR)  


 


Urine pH    (5.0-7.5)  PH


 


Ur Specific Gravity    (1.002-1.030)  


 


Urine Protein    (NEGATIVE)  mg/dL


 


Urine Glucose (UA)    (NEGATIVE)  mg/dL


 


Urine Ketones    (NEGATIVE)  mg/dL


 


Urine Occult Blood    (NEGATIVE)  


 


Urine Nitrite    (NEGATIVE)  


 


Urine Bilirubin    (NEGATIVE)  


 


Urine Urobilinogen    (NORMAL)  E.U./dL


 


Ur Leukocyte Esterase    (NEGATIVE)  


 


Urine RBC    (0-5)  /HPF


 


Urine WBC    (0-5)  /HPF


 


Ur Squamous Epith Cells    (<= Few)  


 


Urine Bacteria    (None Seen)  /HPF


 


Ur Microscopic Review    


 


Urine Culture Comments    


 


Nasal Adenovirus (PCR)    


 


Nasal B. parapertussis DNA (PCR)    


 


Nasal Coronavir 229E PCR    


 


Nasal Coronavir HKU1 PCR    


 


Nasal Coronavir NL63 PCR    


 


Nasal Coronavir OC43 PCR    


 


Nasal Enterovir/Rhinovir PCR    


 


Nasal Influenza B PCR    


 


Nasal Influenza A PCR    


 


Nasal Parainfluen 1 PCR    


 


Nasal Parainfluen 2 PCR    


 


Nasal Parainfluen 3 PCR    


 


Nasal Parainfluen 4 PCR    


 


Nasal RSV (PCR)    


 


Nasal B.pertussis DNA PCR    


 


Nasal C.pneumoniae (PCR)    


 


Sanchez Human Metapneumo PCR    


 


Nasal M.pneumoniae (PCR)    


 


Nasal SARS-CoV-2 (PCR)    














- Diagnostic Imaging


Diagnostic Imaging Comments: 





Formal US performed with DI tech


GS remains intact in lower cervix with 2.6 cm of vascularized tissue in cavity





Assessment/Plan





- Problem List


(1) Incomplete 


Impression: 











Formal TV US shows GS remains intact despite passage of large clots over night


Will proceed with suction D&C





Reviewed risks/benefits/alternatives to  suctionD&C


Risks include, but are not limited to, bleeding, infection, damage to neatby 

tissue and organs. 





On average, expected EBL is minimal. In the event of an unanticipated blood 

loss, patient is willing to undergo transfusion. 


Risks of blood transfusion include infection as well as transfusion reaction


Risk of HIV 1/2million nationwide


Risk of Hepatitis 1/1 million


Risks of transfusion reaction and mgt reviewed





Infection risk low given that no incisions vikas be made and we will be using 

physiologic orifices. 


Will provide IV abx in the event of uterine perforation. 





Damage to nearby tissue and organs  was reviewed with emphasis on uterine 

perforation and management, which can include surgical intervention based on 

bleeding risk. 


Reviewed management of complications and efforts to avoid such outcomes but 

reviewed that they may occur despite our best efforts. 





Patient agreed to the aforementioned procedure and written informed consent was 

obtained.

## 2021-10-30 NOTE — ULTRASOUND REPORT
PROCEDURE:  OB First Trimester w/TV

 

INDICATIONS:  RPOC

 

OUTSIDE/PRIOR DATING DATA:  

Last menstrual period (LMP): 2021.  

LMP-based estimated date of delivery (CAROLINA): 2022.  

First dating scan (date and location): 2021.  

Estimated date of delivery (CAROL[place]) from first dating scan: 2020.  

The below data below was generated using the ultrasound CAROL[place] of 2022

 

TECHNIQUE:  

Real-time scanning was performed of the fetus and maternal pelvic organs, with image documentation.  
Endovaginal scanning was also performed to better visualize the fetus and maternal ovaries.  

 

COMPARISON:  10/29/2021

 

FINDINGS:  

 

Crown-rump length measures 0.9 cm corresponding to 6 weeks 6 days which is decreased from comparison 
examination dated 10/29/2021. Estimated gestational age by initial ultrasound is 17 weeks 2 days. Ges
tational sac containing yolk sac without identifiable fetal heart rate is noted within the vaginal va
ult. The cervix is open. The endometrium within the uterus is irregularity with increased regions of 
vascularity. This is similar to prior examination.

 

Measurement variability in dating:  +/- 4 weeks by LMP, +/- 7 days by mean sac diameter (use before 6
 weeks gestation if crown-rump length not able to be measured), +/- 5 days by crown-rump length (6-12
 weeks gestation).  

 

Maternal organs:  Ovaries right ovary is surgically removed. Left adnexa is within normal limits.. 

 

IMPRESSION:  

Fetal pole and gestational sac identified within the vaginal vault without fetal heart tones. This is
 suggestive of  in progress. Endometrium is again noted to be heterogeneous with increased va
scularity. This may represent retained products of conception versus inflammation. 

 

Reviewed by: Darron Still DO on 10/30/2021 8:30 AM MOHINI

Approved by: Darron Still DO on 10/30/2021 8:30 AM MOHINI

 

 

Station ID:  [id number]

## 2021-10-30 NOTE — OPERATIVE REPORT
Operative Report





- General


Admit Date: 10/29/21


Procedure Date: 10/30/21


Planned Procedure: 





Suction D&C


Pre-Op Diagnosis: Incomplete SAB


Procedure Performed: 





Suction D&C


Post Op Diagnosis: Same





- Procedure Note


Primary Surgeon: Gabriela Ferrera MD


Anesthesia Provider: Todd Arias CRNA


Anesthesia Technique: General ET tube


Pathology: 





Uterine contents/products of conception


IV Fluids (mL): 600


Estimated Blood Loss (mL): 100


Urine Output (mL): 400 (In and out catheterization at start of procedure)


Indications: 





Patient is a 35 yo  with incomplete SAB managed with expectant management 

without resolution over several weeks. She was observed overnight and given 

misoprostol 800 mcg and passed more clot and potential tissue. However, the 

gestational sac and  thickened endometrium remained present on pelvic us this 

am. Decision was made to proceed with definitive management with suction D&C. 

Confirmed Rh positive. 


Findings: 





Gestational sac was tethered to the outside of the cervix. Resistant to removal 

with traction with ring forceps. Residual placenta tissue thickened and 

fibrotic. 


Complications: 





none





- Other


Other Information/Narrative: 


Risks benefits and alternatives to the procedure were reviewed.  Consent was 

again confirmed.  Patient was taken to the operating room where she underwent 

general anesthesia.  She was positioned in dorsolithotomy position with legs 

resting in yellowfin stirrups.  





She was prepped and draped in the usual sterile fashion.  Patient is 

allergic/sensitive to doxycycline and cefazolin. She was given clindamycin 900 

mg IV x1 for prophylaxis.  Preoperative checklist was performed. 





 Exam under anesthesia was performed.  Speculum was placed in the vagina and the

cervix was visualized.  Gestational sac was obscuring the face of the cervix. 

Attempts were madetoo remove the gestational sac with ring forceps. It was 

successfully removed but appearing to be tethered the tissue that remained 

within the uterine cavity. Single-tooth tenaculum was placed at the anterior 

cervical lip.   Paracervical block was administered using a total of 20 cc of 1%

lidocaine with epinephrine was injected at the 4:00 and 8:00 positions lateral 

to the portio of the cervix.  The cervical os was already dilated enough to 

accommodate the caliber of the 7 mm flexible suction catheter. 





The 7 mm flexible catheter was inserted into the cervical os. Tissue was cleared

from cavity in at least 6 passes.  Two plugs of thick fibrotic tissue was mekhi

nadira but was too thick to enter the suction catheter. This was in addition to 

tissue that was removed via suction and collected within the collection unit.  

Gentle sharp curettage was  performed to confirm clearance of tissue from the 

uterine cavity. 


Single toothed tenaculum was removed and good hemostasis was noted. 


  


Procedure was well tolerated and without complication.

## 2021-10-30 NOTE — ANESTHESIA POST OP EVALUATION
Anesthesia Post Eval





- Post Anesthesia Eval


Vitals: 





                                Last Vital Signs











Temp  37 C   10/30/21 10:35


 


Pulse  77   10/30/21 10:35


 


Resp  17   10/30/21 10:35


 


BP  116/73   10/30/21 10:35


 


Pulse Ox  99   10/30/21 10:35











CV Function Including HR & BP: Stable


Pain Control: Satisfactory


Nausea & Vomiting: Negative


Mental Status: Baseline


Respiratory Status: Airway Patent


Hydration Status: Satisfactory


Anesthesia Complications: None

## 2021-10-30 NOTE — ANESTHESIA
Pre-Anesthesia VS, & Labs





- Diagnosis





spont 





- Procedure





suction D&C


Vital Signs: 





                                        











Temp Pulse Resp BP Pulse Ox


 


 36.4 C L  64   16   116/64   99 


 


 10/30/21 05:34  10/30/21 05:34  10/30/21 05:34  10/30/21 05:34  10/30/21 05:34











Height: 5 ft 3 in


Weight (kg): 73.482 kg


Body Mass Index: 28.7


BMI Classification: Overweight





- NPO


>8 hours





- Pregnancy


Is Patient Pregnant?: No





- Lab Results


Current Lab Results: 





Laboratory Tests





10/29/21 14:10: HCG, Quant 418.21


10/29/21 11:55: Sodium 138, Potassium 3.9, Chloride 105, Carbon Dioxide 25, 

Anion Gap 8.0, BUN 13, Creatinine 0.7, Estimated GFR (MDRD) 94, Glucose 118 H, 

Calcium 9.4, Total Bilirubin 0.7, AST 18, ALT 19, Alkaline Phosphatase 40 L, 

Total Protein 7.1, Albumin 3.9, Globulin 3.2, Albumin/Globulin Ratio 1.2, Lipase

37


10/29/21 11:55: WBC 7.9, RBC 4.04 L, Hgb 13.0, Hct 38.0, MCV 94.1, MCH 32.2 H, 

MCHC 34.2, RDW 11.9 L, Plt Count 182, MPV 9.9, Neut # (Auto) 5.6, Lymph # (Auto)

1.7, Mono # (Auto) 0.4, Eos # (Auto) 0.2, Baso # (Auto) 0.0, Absolute Nucleated 

RBC 0.00, Nucleated RBC % 0.0








Lab results reviewed: Yes


Fish Bones: 


                                 10/29/21 11:55





                                 10/29/21 11:55





Home Medications and Allergies


Home Medications: 


Ambulatory Orders





Hydrocortisone [Cortef] 20 mg PO DAILY 10/30/21 











Active Medications





Acetaminophen (Acetaminophen 325 Mg Tablet)  650 mg PO Q4HR PRN


   PRN Reason: Pain 1 to 4


   Last Admin: 10/29/21 20:08 Dose:  650 mg


   Documented by: 


Diphenhydramine HCl (Diphenhydramine Inj 50 Mg/Ml Vial)  25 mg IVP ONCE PRN


   PRN Reason: hives


   Stop: 21 16:50


Hydromorphone HCl (Hydromorphone 0.5 Mg/0.5 Ml Syringe)  0.5 mg IVP Q2H PRN


   PRN Reason: Pain 8 to 10


Hydromorphone HCl (Hydromorphone Pca 20mg/100ml)  0 mg IV PRN PRN; Protocol


   PRN Reason: PAIN


Lactated Ringer's (Lr)  1,000 mls @ 125 mls/hr IV .Q8H Count includes the [facility]


   Last Admin: 10/30/21 03:16 Dose:  125 mls/hr


   Documented by: 


Ibuprofen (Ibuprofen 600 Mg Tablet)  600 mg PO Q6HR PRN


   PRN Reason: Pain 1 to 4


   Last Admin: 10/29/21 19:41 Dose:  600 mg


   Documented by: 


Misoprostol (Misoprostol 200 Mcg Tablet)  800 mcg BC ONCE Count includes the [facility]


   Stop: 10/30/21 19:59


   Last Admin: 10/29/21 20:05 Dose:  800 mcg


   Documented by: 


Ondansetron HCl (Ondansetron Odt 4 Mg Tablet)  4 mg TL Q6HR PRN


   PRN Reason: Nausea / Vomiting


Ondansetron HCl (Ondansetron 4 Mg/2 Ml Vial)  4 mg IVP Q6HR PRN


   PRN Reason: Nausea / Vomiting


Prochlorperazine Edisylate (Prochlorperazine 10 Mg/2 Ml Vial)  10 mg IVP Q6HR 

PRN


   PRN Reason: Nausea / Vomiting


Promethazine HCl (Promethazine 25 Mg/1 Ml Vial)  25 mg IM Q6HR PRN


   PRN Reason: Nausea / Vomiting


Sodium Chloride (Sodium Chloride Flush 0.9% 10 Ml Syringe)  10 ml IVP PRN PRN


   PRN Reason: AS NEEDED PER PROVIDER ORDERS


Sodium Chloride (Sodium Chloride Flush 0.9% 10 Ml Syringe)  10 ml IVP 

0100,0900,1700 Count includes the [facility]


   Last Admin: 10/29/21 23:46 Dose:  Not Given


   Documented by: 





                                        





Pimecrolimus [Elidel] 30 gm TP 20 


DULoxetine [Cymbalta] 30 mg PO DAILY 21 


Hydrocortisone [Cortef] 20 mg PO DAILY 10/30/21 








Allergies/Adverse Reactions: 


                                    Allergies











Allergy/AdvReac Type Severity Reaction Status Date / Time


 


bromhexine Allergy Severe problems Verified 10/29/21 11:28





   w/ airway  


 


cephalexin Allergy  Hives Verified 10/29/21 11:28


 


ciprofloxacin [From Cipro] Allergy  Rash Verified 10/29/21 11:28


 


codeine Allergy  Hives Verified 10/29/21 11:28


 


fluconazole Allergy  Respiratory Verified 10/29/21 11:28


 


minocycline Allergy  Respiratory Verified 10/29/21 11:28


 


Sulfa (Sulfonamide Allergy  Unknown Verified 10/29/21 11:28





Antibiotics)     


 


sulfamethoxazole Allergy  Respiratory Verified 10/29/21 11:28





[From Bactrim]     


 


Tetracyclines Allergy  Respiratory Verified 10/29/21 11:28


 


trimethoprim [From Bactrim] Allergy  Respiratory Verified 10/29/21 11:28


 


hydromorphone [From Dilaudid] AdvReac  Anxiety Verified 10/29/21 11:28


 


oxycodone AdvReac  Unknown Verified 10/29/21 11:28














Anes History & Medical History





- Anesthetic History


Anesthesia Complications: reports: No previous complications


Family history of Anesthesia Complications: Denies


Family history of Malignant Hyperthermia: Denies





- Medical History


Cardiovascular: reports: None


Pulmonary: reports: None


Gastrointestinal: reports: None


Urinary: reports: None


Neuro: reports: Headaches


Musculoskeletal: reports: None


Endocrine/Autoimmune: reports: Other


Blood Disorders: reports: None


Skin: reports: Rosacea


Smoking Status: Never smoker


History of Cancer?: No





- Surgical History


General: reports: Other


Gynecologic: reports: Oophrectomy





Exam


General: Alert, Oriented x3, Cooperative


Dental: WNL


Mouth Opening: 3 Fingerbreadth


Neck Mobility: Normal


Mallampati classification: III


Thyromental Distance: 4-6 cm


Respiratory: Lungs clear, Normal breath sounds, No respiratory distress


Cardiovascular: Regular rate, Other (B hands swollen and itching)


Neurological: Normal speech


Mental/Cognitive Status: Alert/Oriented X3, Normal for patient


Cognitive Status: Within normal limits





Plan


Anesthesia Type: General


Consent for Procedure(s) Verified and Reviewed: Yes


Code Status: Attempt Resuscitation


ASA classification: 2-Mild systemic disease


Is this case an emergency?: No

## 2021-12-15 ENCOUNTER — HOSPITAL ENCOUNTER (OUTPATIENT)
Dept: HOSPITAL 76 - LAB.R | Age: 36
Discharge: HOME | End: 2021-12-15
Attending: OBSTETRICS & GYNECOLOGY
Payer: COMMERCIAL

## 2021-12-15 DIAGNOSIS — N89.8: Primary | ICD-10-CM

## 2021-12-15 PROCEDURE — 87661 TRICHOMONAS VAGINALIS AMPLIF: CPT

## 2021-12-15 PROCEDURE — 87801 DETECT AGNT MULT DNA AMPLI: CPT

## 2021-12-16 LAB
BV DNA PNL VAG NAA+PROBE: NEGATIVE
C GLABRATA DNA BLD QL NAA+PROBE: NEGATIVE
C KRUSEI DNA VAG QL NAA+PROBE: NEGATIVE
CANDIDA DNA VAG QL NAA+PROBE: NEGATIVE
T VAGINALIS RRNA GENITAL QL PROBE: NEGATIVE

## 2022-04-05 ENCOUNTER — HOSPITAL ENCOUNTER (OUTPATIENT)
Dept: HOSPITAL 76 - LAB.N | Age: 37
Discharge: HOME | End: 2022-04-05
Attending: NURSE PRACTITIONER
Payer: COMMERCIAL

## 2022-04-05 DIAGNOSIS — R10.9: Primary | ICD-10-CM

## 2022-04-05 LAB
ALBUMIN DIAFP-MCNC: 4.3 G/DL (ref 3.2–5.5)
ALBUMIN/GLOB SERPL: 1.3 {RATIO} (ref 1–2.2)
ALP SERPL-CCNC: 50 IU/L (ref 42–121)
ALT SERPL W P-5'-P-CCNC: 33 IU/L (ref 10–60)
AMYLASE SERPL-CCNC: 69 U/L (ref 28–100)
ANION GAP SERPL CALCULATED.4IONS-SCNC: 7 MMOL/L (ref 6–13)
AST SERPL W P-5'-P-CCNC: 26 IU/L (ref 10–42)
B-HCG SERPL QL: NEGATIVE
BASOPHILS NFR BLD AUTO: 0 10^3/UL (ref 0–0.1)
BASOPHILS NFR BLD AUTO: 0.5 %
BILIRUB BLD-MCNC: 0.6 MG/DL (ref 0.2–1)
BUN SERPL-MCNC: 11 MG/DL (ref 6–20)
CALCIUM UR-MCNC: 9.2 MG/DL (ref 8.5–10.3)
CHLORIDE SERPL-SCNC: 100 MMOL/L (ref 101–111)
CLARITY UR REFRACT.AUTO: CLEAR
CO2 SERPL-SCNC: 28 MMOL/L (ref 21–32)
CREAT SERPLBLD-SCNC: 0.8 MG/DL (ref 0.4–1)
EOSINOPHIL # BLD AUTO: 0.2 10^3/UL (ref 0–0.7)
EOSINOPHIL NFR BLD AUTO: 2.9 %
ERYTHROCYTE [DISTWIDTH] IN BLOOD BY AUTOMATED COUNT: 13.4 % (ref 12–15)
GFRSERPLBLD MDRD-ARVRAT: 80 ML/MIN/{1.73_M2} (ref 89–?)
GLOBULIN SER-MCNC: 3.4 G/DL (ref 2.1–4.2)
GLUCOSE SERPL-MCNC: 100 MG/DL (ref 70–100)
GLUCOSE UR QL STRIP.AUTO: NEGATIVE MG/DL
HCT VFR BLD AUTO: 45.5 % (ref 37–47)
HGB UR QL STRIP: 15.1 G/DL (ref 12–16)
KETONES UR QL STRIP.AUTO: NEGATIVE MG/DL
LIPASE SERPL-CCNC: 30 U/L (ref 22–51)
LYMPHOCYTES # SPEC AUTO: 2.2 10^3/UL (ref 1.5–3.5)
LYMPHOCYTES NFR BLD AUTO: 28.3 %
MCH RBC QN AUTO: 30.1 PG (ref 27–31)
MCHC RBC AUTO-ENTMCNC: 33.2 G/DL (ref 32–36)
MCV RBC AUTO: 90.8 FL (ref 81–99)
MONOCYTES # BLD AUTO: 0.6 10^3/UL (ref 0–1)
MONOCYTES NFR BLD AUTO: 7.5 %
NEUTROPHILS # BLD AUTO: 4.7 10^3/UL (ref 1.5–6.6)
NEUTROPHILS # SNV AUTO: 7.7 X10^3/UL (ref 4.8–10.8)
NEUTROPHILS NFR BLD AUTO: 60.5 %
NITRITE UR QL STRIP.AUTO: NEGATIVE
NRBC # BLD AUTO: 0 /100WBC
NRBC # BLD AUTO: 0 X10^3/UL
PDW BLD AUTO: 10.6 FL (ref 7.9–10.8)
PH UR STRIP.AUTO: 6 PH (ref 5–7.5)
PLATELET # BLD: 223 10^3/UL (ref 130–450)
POTASSIUM SERPL-SCNC: 3.7 MMOL/L (ref 3.5–5)
PROT SPEC-MCNC: 7.7 G/DL (ref 6.7–8.2)
PROT UR STRIP.AUTO-MCNC: NEGATIVE MG/DL
RBC # UR STRIP.AUTO: NEGATIVE /UL
RBC MAR: 5.01 10^6/UL (ref 4.2–5.4)
SODIUM SERPLBLD-SCNC: 135 MMOL/L (ref 135–145)
SP GR UR STRIP.AUTO: <=1.005 (ref 1–1.03)
SQUAMOUS URNS QL MICRO: (no result)
UROBILINOGEN UR QL STRIP.AUTO: (no result) E.U./DL
UROBILINOGEN UR STRIP.AUTO-MCNC: NEGATIVE MG/DL
WBC # UR MANUAL: (no result) /HPF (ref 0–5)

## 2022-04-05 PROCEDURE — 85025 COMPLETE CBC W/AUTO DIFF WBC: CPT

## 2022-04-05 PROCEDURE — 84703 CHORIONIC GONADOTROPIN ASSAY: CPT

## 2022-04-05 PROCEDURE — 81001 URINALYSIS AUTO W/SCOPE: CPT

## 2022-04-05 PROCEDURE — 80053 COMPREHEN METABOLIC PANEL: CPT

## 2022-04-05 PROCEDURE — 87077 CULTURE AEROBIC IDENTIFY: CPT

## 2022-04-05 PROCEDURE — 83690 ASSAY OF LIPASE: CPT

## 2022-04-05 PROCEDURE — 82150 ASSAY OF AMYLASE: CPT

## 2022-04-05 PROCEDURE — 87086 URINE CULTURE/COLONY COUNT: CPT

## 2022-04-05 PROCEDURE — 36415 COLL VENOUS BLD VENIPUNCTURE: CPT

## 2022-04-05 PROCEDURE — 87181 SC STD AGAR DILUTION PER AGT: CPT

## 2022-04-22 ENCOUNTER — HOSPITAL ENCOUNTER (EMERGENCY)
Dept: HOSPITAL 76 - ED | Age: 37
Discharge: HOME | End: 2022-04-22
Payer: COMMERCIAL

## 2022-04-22 VITALS — DIASTOLIC BLOOD PRESSURE: 70 MMHG | SYSTOLIC BLOOD PRESSURE: 127 MMHG

## 2022-04-22 DIAGNOSIS — R10.84: Primary | ICD-10-CM

## 2022-04-22 LAB
ALBUMIN DIAFP-MCNC: 4.2 G/DL (ref 3.2–5.5)
ALBUMIN/GLOB SERPL: 1.1 {RATIO} (ref 1–2.2)
ALP SERPL-CCNC: 55 IU/L (ref 42–121)
ALT SERPL W P-5'-P-CCNC: 43 IU/L (ref 10–60)
ANION GAP SERPL CALCULATED.4IONS-SCNC: 9 MMOL/L (ref 6–13)
AST SERPL W P-5'-P-CCNC: 32 IU/L (ref 10–42)
BASOPHILS NFR BLD AUTO: 0 10^3/UL (ref 0–0.1)
BASOPHILS NFR BLD AUTO: 0.4 %
BILIRUB BLD-MCNC: 0.3 MG/DL (ref 0.2–1)
BUN SERPL-MCNC: 12 MG/DL (ref 6–20)
CALCIUM UR-MCNC: 9.3 MG/DL (ref 8.5–10.3)
CHLORIDE SERPL-SCNC: 103 MMOL/L (ref 101–111)
CO2 SERPL-SCNC: 25 MMOL/L (ref 21–32)
CREAT SERPLBLD-SCNC: 0.7 MG/DL (ref 0.4–1)
EOSINOPHIL # BLD AUTO: 0.1 10^3/UL (ref 0–0.7)
EOSINOPHIL NFR BLD AUTO: 2 %
ERYTHROCYTE [DISTWIDTH] IN BLOOD BY AUTOMATED COUNT: 13.2 % (ref 12–15)
GFRSERPLBLD MDRD-ARVRAT: 94 ML/MIN/{1.73_M2} (ref 89–?)
GLOBULIN SER-MCNC: 3.9 G/DL (ref 2.1–4.2)
GLUCOSE SERPL-MCNC: 92 MG/DL (ref 70–100)
HCT VFR BLD AUTO: 44.1 % (ref 37–47)
HGB UR QL STRIP: 14.7 G/DL (ref 12–16)
LIPASE SERPL-CCNC: 30 U/L (ref 22–51)
LYMPHOCYTES # SPEC AUTO: 1.5 10^3/UL (ref 1.5–3.5)
LYMPHOCYTES NFR BLD AUTO: 30.6 %
MCH RBC QN AUTO: 30.4 PG (ref 27–31)
MCHC RBC AUTO-ENTMCNC: 33.3 G/DL (ref 32–36)
MCV RBC AUTO: 91.1 FL (ref 81–99)
MONOCYTES # BLD AUTO: 0.6 10^3/UL (ref 0–1)
MONOCYTES NFR BLD AUTO: 12.3 %
NEUTROPHILS # BLD AUTO: 2.7 10^3/UL (ref 1.5–6.6)
NEUTROPHILS # SNV AUTO: 4.9 X10^3/UL (ref 4.8–10.8)
NEUTROPHILS NFR BLD AUTO: 54.5 %
NRBC # BLD AUTO: 0 /100WBC
NRBC # BLD AUTO: 0 X10^3/UL
PDW BLD AUTO: 9.8 FL (ref 7.9–10.8)
PLATELET # BLD: 193 10^3/UL (ref 130–450)
POTASSIUM SERPL-SCNC: 3.9 MMOL/L (ref 3.5–5)
PROT SPEC-MCNC: 8.1 G/DL (ref 6.7–8.2)
RBC MAR: 4.84 10^6/UL (ref 4.2–5.4)
SODIUM SERPLBLD-SCNC: 137 MMOL/L (ref 135–145)

## 2022-04-22 PROCEDURE — 80053 COMPREHEN METABOLIC PANEL: CPT

## 2022-04-22 PROCEDURE — 36415 COLL VENOUS BLD VENIPUNCTURE: CPT

## 2022-04-22 PROCEDURE — 83690 ASSAY OF LIPASE: CPT

## 2022-04-22 PROCEDURE — 85025 COMPLETE CBC W/AUTO DIFF WBC: CPT

## 2022-04-22 NOTE — ED PHYSICIAN DOCUMENTATION
History of Present Illness





- Stated complaint


Stated Complaint: ABD PX





- Chief complaint


Chief Complaint: Abd Pain





- Additonal information


Additional information: 





36-year-old female return to the emergency department for evaluation of persis

tent generalized abdominal pain.  Seen yesterday for similar.  During the ED 

visit yesterday she had negative serum CBC and blood chemistry.  Her CT was 

unremarkable.  Her urine was nitrite positive.  The patient reports that she 

completed a course of Macrobid about 1 week ago.





She states that a number of years ago she underwent a right sided oophorectomy 

and following that procedure she has had recurrent urinary tract infections with

no clear cause.  She has been seen by urology multiple times.





since being discharged from the emergency department yesterday she has not had 

fevers she does endorse some nausea and some mildly loose stools though not 

watery.  She has taken nothing for pain but has multiple allergies to most 

antibiotics as well as analgesics.





Patient reports that she simply wants to make sure that there is nothing serious

wrong.














Review of Systems


Constitutional: denies: Fever


Nose: reports: Reviewed and negative


Throat: reports: Reviewed and negative


Cardiac: reports: Reviewed and negative


Respiratory: reports: Reviewed and negative


GI: reports: Abdominal Pain, Nausea, Vomiting.  denies: Constipation, Diarrhea


: reports: Reviewed and negative


Skin: reports: Reviewed and negative


Musculoskeletal: reports: Reviewed and negative





PD PAST MEDICAL HISTORY





- Past Medical History


Cardiovascular: None


Respiratory: None


Neuro: Headaches


Endocrine/Autoimmune: Other


GI: None


GYN: Ovarian cysts


: None


HEENT: None


Psych: Depression, Anxiety


Musculoskeletal: None


Derm: Rosacea





- Past Surgical History


Past Surgical History: Yes


General: Other


/GYN: Oophrectomy





- Present Medications


Home Medications: 


                                Ambulatory Orders











 Medication  Instructions  Recorded  Confirmed


 


Famotidine [Pepcid] 20 mg PO BID #60 tablet 10/28/20 


 


Ibuprofen [Motrin] 600 mg PO Q6H PRN #30 tab 12/23/20 


 


Pimecrolimus [Elidel] 30 gm TP 12/23/20 


 


DULoxetine [Cymbalta] 30 mg PO DAILY 07/29/21 07/29/21


 


Hydrocortisone [Cortef] 20 mg PO DAILY 10/30/21 


 


Fluticasone [Flonase] 120 sprays MAGDI PRN PRN 04/22/22 04/22/22














- Allergies


Allergies/Adverse Reactions: 


                                    Allergies











Allergy/AdvReac Type Severity Reaction Status Date / Time


 


bromhexine Allergy Severe problems Verified 04/22/22 18:39





   w/ airway  


 


cephalexin Allergy  Hives Verified 04/22/22 18:39


 


ciprofloxacin [From Cipro] Allergy  Rash Verified 04/22/22 18:39


 


codeine Allergy  Hives Verified 04/22/22 18:39


 


fluconazole Allergy  Respiratory Verified 04/22/22 18:39


 


minocycline Allergy  Respiratory Verified 04/22/22 18:39


 


Sulfa (Sulfonamide Allergy  Unknown Verified 04/22/22 18:39





Antibiotics)     


 


sulfamethoxazole Allergy  Respiratory Verified 04/22/22 18:39





[From Bactrim]     


 


Tetracyclines Allergy  Respiratory Verified 04/22/22 18:39


 


trimethoprim [From Bactrim] Allergy  Respiratory Verified 04/22/22 18:39


 


hydromorphone [From Dilaudid] AdvReac  Anxiety Verified 04/22/22 18:39


 


oxycodone AdvReac  Unknown Verified 04/22/22 18:39














- Social History


Does the pt smoke?: No


Smoking Status: Never smoker


Does the pt drink ETOH?: Yes


Does the pt have substance abuse?: No





- Immunizations


Immunizations are current?: Yes





- POLST


Patient has POLST: No





PD ED PE NORMAL





- General


General: Alert and oriented X 3, No acute distress, Well developed/nourished





- HEENT


HEENT: Atraumatic, Moist mucous membranes, Pharynx benign





- Neck


Neck: Supple, no meningeal sign, Thyroid normal, No JVD





- Cardiac


Cardiac: RRR, No murmur, No gallop





- Respiratory


Respiratory: No respiratory distress, Clear bilaterally





- Abdomen


Abdomen: Normal bowel sounds, Soft.  No: Non tender (Generalized abdominal 

tenderness.  Nonperitoneal.  No guarding or rebound.)





- Female 


Female : Deferred, Pt declined





- Rectal


Rectal: Deferred





- Back


Back: No CVA TTP





Results





- Vitals


Vitals: 





                               Vital Signs - 24 hr











  04/22/22





  18:37


 


Temperature 36.7 C


 


Heart Rate 81


 


Respiratory 16





Rate 


 


Blood Pressure 127/70


 


O2 Saturation 100








                                     Oxygen











O2 Source                      Room air

















- Labs


Labs: 





                                Laboratory Tests











  04/22/22 04/22/22





  19:41 19:41


 


WBC  4.9 


 


RBC  4.84 


 


Hgb  14.7 


 


Hct  44.1 


 


MCV  91.1 


 


MCH  30.4 


 


MCHC  33.3 


 


RDW  13.2 


 


Plt Count  193 


 


MPV  9.8 


 


Neut # (Auto)  2.7 


 


Lymph # (Auto)  1.5 


 


Mono # (Auto)  0.6 


 


Eos # (Auto)  0.1 


 


Baso # (Auto)  0.0 


 


Absolute Nucleated RBC  0.00 


 


Nucleated RBC %  0.0 


 


Sodium   137


 


Potassium   3.9


 


Chloride   103


 


Carbon Dioxide   25


 


Anion Gap   9.0


 


BUN   12


 


Creatinine   0.7


 


Estimated GFR (MDRD)   94


 


Glucose   92


 


Calcium   9.3


 


Total Bilirubin   0.3


 


AST   32


 


ALT   43


 


Alkaline Phosphatase   55


 


Total Protein   8.1


 


Albumin   4.2


 


Globulin   3.9


 


Albumin/Globulin Ratio   1.1


 


Lipase   30














PD MEDICAL DECISION MAKING





- ED course


Complexity details: reviewed results, re-evaluated patient, considered garcia king, d/w patient, d/w family


ED course: 





36-year-old female return to the emergency department for unabated abdominal 

pain.  Seen here yesterday for similar had unremarkable labs and CT.  The only 

abnormality was a nitrite positive urine.





I did repeat serum chemistry today and it is again without worrisome findings.  

No leukocytosis.  Here in the ER she has unremarkable vitals without fever.





Her abdominal exam was generally tender though nonperitoneal and nonfocal.  I do

not feel that she would benefit from repeat imaging given unremarkable labs and 

negative CT yesterday.





We did discuss the possibility of an untreated urinary tract infection given 

persistent nitrite positive urine.  However given her multiple medication 

allergies including to cephalosporins fluoroquinolones and Bactrim I feel that 

it would be appropriate to defer antibiotics until yesterday's urine culture is 

known.





I have encouraged the patient to have very close follow-up with her primary care

doctor.  She may benefit from EGD or colonoscopy for recurrent and unclear 

etiology of abdominal pain.





Departure





- Departure


Disposition: 01 Home, Self Care


Clinical Impression: 


Abdominal pain


Qualifiers:


 Abdominal location: generalized Qualified Code(s): R10.84 - Generalized 

abdominal pain





Condition: Stable


Record reviewed to determine appropriate education?: Yes


Comments: 


Jun stevens are seen today in the emergency department for continued abdominal 

pain.  Your labs and CT yesterday were essentially normal.  Your repeat labs 

today are normal.  The only abnormal finding has been a nitrite positive urine. 

A culture is pending.





We will notify you if the culture results require treatment.





However I do want you to continue to follow closely with your primary doctor to 

discuss this recurrent abdominal pain.  You may benefit from referral to a 

gastroenterologist for consideration of an EGD to look in your stomach for 

ulcers or testing for H. pylori.





I do recommend that you take an acid pill such as Pepcid or omeprazole daily.  

To manage your pain you can try ibuprofen or Tylenol and you may alternate them.

 I recommend that you take both with food.





If you have fevers higher than 102, uncontrolled vomiting, black or bloody 

stools then please return immediately to the ER for second evaluation

## 2022-05-02 ENCOUNTER — HOSPITAL ENCOUNTER (OUTPATIENT)
Dept: HOSPITAL 76 - LAB.N | Age: 37
Discharge: HOME | End: 2022-05-02
Attending: OBSTETRICS & GYNECOLOGY
Payer: COMMERCIAL

## 2022-05-02 DIAGNOSIS — E88.81: ICD-10-CM

## 2022-05-02 DIAGNOSIS — E78.5: Primary | ICD-10-CM

## 2022-05-02 DIAGNOSIS — Z13.1: ICD-10-CM

## 2022-05-02 DIAGNOSIS — R30.0: ICD-10-CM

## 2022-05-02 LAB
CHOLEST SERPL-MCNC: 268 MG/DL
EST. AVERAGE GLUCOSE BLD GHB EST-MCNC: 97 MG/DL (ref 70–100)
HBA1C MFR BLD HPLC: 5 % (ref 4.27–6.07)
HDLC SERPL-MCNC: 53 MG/DL
HDLC SERPL: 5.1 {RATIO} (ref ?–4.4)
LDLC SERPL CALC-MCNC: 190 MG/DL
LDLC/HDLC SERPL: 3.6 {RATIO} (ref ?–4.4)
TRIGL P FAST SERPL-MCNC: 127 MG/DL
VLDLC SERPL-SCNC: 25 MG/DL

## 2022-05-02 PROCEDURE — 80061 LIPID PANEL: CPT

## 2022-05-02 PROCEDURE — 83036 HEMOGLOBIN GLYCOSYLATED A1C: CPT

## 2022-05-02 PROCEDURE — 36415 COLL VENOUS BLD VENIPUNCTURE: CPT

## 2022-05-02 PROCEDURE — 83721 ASSAY OF BLOOD LIPOPROTEIN: CPT

## 2022-05-02 PROCEDURE — 87086 URINE CULTURE/COLONY COUNT: CPT

## 2022-05-09 ENCOUNTER — HOSPITAL ENCOUNTER (OUTPATIENT)
Dept: HOSPITAL 76 - DI | Age: 37
Discharge: HOME | End: 2022-05-09
Attending: OBSTETRICS & GYNECOLOGY
Payer: COMMERCIAL

## 2022-05-09 DIAGNOSIS — N28.9: Primary | ICD-10-CM

## 2022-05-09 DIAGNOSIS — R10.2: ICD-10-CM

## 2022-05-09 DIAGNOSIS — N30.20: ICD-10-CM

## 2022-05-09 DIAGNOSIS — Z90.721: ICD-10-CM

## 2022-05-09 DIAGNOSIS — Z87.42: ICD-10-CM

## 2022-05-09 NOTE — ULTRASOUND REPORT
PROCEDURE:  Retroperitoneal

 

INDICATIONS:  HIST OF OVARIAN CYST, PELVIC PAIN, RENAL PAIN

 

TECHNIQUE:  

Real-time scanning was performed of the kidneys with image documentation.  

 

COMPARISON:  CT abdomen pelvis 4/22/2022

 

FINDINGS:     

 

Kidneys:  Kidneys are normal in size.  Right kidney measures 9.2 cm long; left kidney measures 10.3 c
m long.  Right renal cortical thickness is 2.2 cm; left renal cortical thickness is 1.6 cm.  No solid
 masses, hydronephrosis, or nephrolithiasis.  

 

Bladder:  Pre-void bladder volume is 348 mL.  Post-void residual is 9.1 mL.  Pre-void images demonstr
ate no intraluminal masses or stones.  On pre-void images, bilateral ureteral jets are noted with col
or Doppler interrogation.  (Of note, ureteral jets may not be detectable in up to 25% of cases due to
 insufficient differences in specific gravity between ureteral and bladder urine).  

 

Miscellaneous:  No free abdominal fluid.  

 

IMPRESSION:  

Normal kidneys and urinary bladder.

 

Reviewed by: Radha Bond MD on 5/9/2022 4:43 PM PDT

Approved by: Radha Bond MD on 5/9/2022 4:43 PM PDT

 

 

Station ID:  [id number]

## 2022-05-09 NOTE — ULTRASOUND REPORT
PROCEDURE:  Pelvic w/Transvaginal

 

INDICATIONS:  HIST OF OVARIAN CYST, PELVIC PAIN, RENAL PAIN

 

TECHNIQUE:  

Real-time scanning was performed of the pelvic organs, with image documentation.  Additional endovagi
nal scanning was necessary due to incomplete visualization of the adnexal and endometrial structures 
by transabdominal scanning.  

 

COMPARISON:  None.

 

FINDINGS:  

Limited scanning through the kidneys shows no hydronephrosis.  No pathologic free abdominal or pelvic
 fluid.  

 

Uterus:  Uterus is normal in size at 7.5 x 3.5 x 4.5 cm.  The endometrium measures 13 mm in combined 
thickness.  

 

Ovaries:  Right ovary is surgically absent. Left ovary is within normal limits measuring 34 mm x 36 m
m x 21 mm.

 

Other:  No free pelvic fluid.  

 

 

IMPRESSION:  

1. Postsurgical sequelae.

2. Otherwise negative examination.

 

Reviewed by: Rodrigo Fontenot MD on 5/9/2022 4:57 PM PDT

Approved by: Rodrigo Fontenot MD on 5/9/2022 4:57 PM PDT

 

 

Station ID:  [id number]

## 2022-07-08 ENCOUNTER — HOSPITAL ENCOUNTER (OUTPATIENT)
Dept: HOSPITAL 76 - DI | Age: 37
Discharge: HOME | End: 2022-07-08
Attending: PHYSICIAN ASSISTANT
Payer: COMMERCIAL

## 2022-07-08 DIAGNOSIS — R10.84: Primary | ICD-10-CM

## 2022-07-08 DIAGNOSIS — R11.0: ICD-10-CM

## 2022-07-08 NOTE — ULTRASOUND REPORT
PROCEDURE:  Abdomen Complete

 

INDICATIONS:  ABD PAIN, NAUSEA

 

TECHNIQUE:  

Real-time scanning was performed of the abdominal and retroperitoneal organs, with image documentatio
n.  

 

COMPARISON:  None.

 

FINDINGS:  

 

Liver:  Liver is normal in size and homogeneous in echotexture.  

 

Gallbladder: No stones. Wall thickness is normal measuring 1.7 mm.

 

Biliary ducts:  Intrahepatic bile ducts are non-dilated.  Extrahepatic bile duct caliber measures 4.6
 mm.  Normal is 6-7 mm or less in diameter, or 10 mm or less post-cholecystectomy.  

 

Pancreas: Not visualized.

 

Spleen:  Spleen is normal in size and homogeneous in echotexture.  

 

Kidneys:  Kidneys are normal in size and echotexture.  Right kidney measures 9.9 cm long; left kidney
 measures 10.6 cm long.  No hydronephrosis or nephrolithiasis.  No solid masses.  

 

Aorta:  Visualized aorta is normal in caliber at less than 3 cm.  

 

Iliacs:  Proximal common iliac arteries are normal in caliber at less than 2.5 cm.  

 

IVC:  Intrahepatic inferior vena cava is patent.  

 

Miscellaneous:  No free abdominal fluid.  

 

IMPRESSION:  

Unremarkable exam.

 

Reviewed by: Rose Calzada MD on 7/8/2022 11:36 AM PDT

Approved by: Rose Calzada MD on 7/8/2022 11:36 AM PDT

 

 

Station ID:  [id number]

## 2022-10-28 ENCOUNTER — HOSPITAL ENCOUNTER (OUTPATIENT)
Dept: HOSPITAL 76 - LAB | Age: 37
Discharge: HOME | End: 2022-10-28
Payer: COMMERCIAL

## 2022-10-28 DIAGNOSIS — R53.83: Primary | ICD-10-CM

## 2022-10-28 LAB
ERYTHROCYTE [DISTWIDTH] IN BLOOD BY AUTOMATED COUNT: 12.6 % (ref 12–15)
FERRITIN SERPL-MCNC: 9.8 NG/ML (ref 11–306.8)
HCT VFR BLD AUTO: 44.7 % (ref 37–47)
HGB UR QL STRIP: 14.8 G/DL (ref 12–16)
MCH RBC QN AUTO: 29.8 PG (ref 27–31)
MCHC RBC AUTO-ENTMCNC: 33.1 G/DL (ref 32–36)
MCV RBC AUTO: 90.1 FL (ref 81–99)
NEUTROPHILS # SNV AUTO: 6.2 X10^3/UL (ref 4.8–10.8)
PDW BLD AUTO: 9.7 FL (ref 7.9–10.8)
PLATELET # BLD: 214 10^3/UL (ref 130–450)
RBC MAR: 4.96 10^6/UL (ref 4.2–5.4)
T4 FREE SERPL-MCNC: 0.7 NG/DL (ref 0.58–1.64)
TSH SERPL-ACNC: 1.39 UIU/ML (ref 0.34–5.6)

## 2022-10-28 PROCEDURE — 84443 ASSAY THYROID STIM HORMONE: CPT

## 2022-10-28 PROCEDURE — 84439 ASSAY OF FREE THYROXINE: CPT

## 2022-10-28 PROCEDURE — 85027 COMPLETE CBC AUTOMATED: CPT

## 2022-10-28 PROCEDURE — 82728 ASSAY OF FERRITIN: CPT

## 2022-10-28 PROCEDURE — 36415 COLL VENOUS BLD VENIPUNCTURE: CPT

## 2022-11-01 ENCOUNTER — HOSPITAL ENCOUNTER (EMERGENCY)
Dept: HOSPITAL 76 - ED | Age: 37
Discharge: HOME | End: 2022-11-01
Payer: COMMERCIAL

## 2022-11-01 VITALS — SYSTOLIC BLOOD PRESSURE: 130 MMHG | DIASTOLIC BLOOD PRESSURE: 97 MMHG

## 2022-11-01 DIAGNOSIS — R68.83: ICD-10-CM

## 2022-11-01 DIAGNOSIS — Z87.891: ICD-10-CM

## 2022-11-01 DIAGNOSIS — R11.0: Primary | ICD-10-CM

## 2022-11-01 DIAGNOSIS — R55: ICD-10-CM

## 2022-11-01 LAB
ALBUMIN DIAFP-MCNC: 4.4 G/DL (ref 3.2–5.5)
ALBUMIN/GLOB SERPL: 1.3 {RATIO} (ref 1–2.2)
ALP SERPL-CCNC: 59 IU/L (ref 42–121)
ALT SERPL W P-5'-P-CCNC: 34 IU/L (ref 10–60)
ANION GAP SERPL CALCULATED.4IONS-SCNC: 11 MMOL/L (ref 6–13)
AST SERPL W P-5'-P-CCNC: 29 IU/L (ref 10–42)
BASOPHILS NFR BLD AUTO: 0.1 10^3/UL (ref 0–0.1)
BASOPHILS NFR BLD AUTO: 0.5 %
BILIRUB BLD-MCNC: 0.2 MG/DL (ref 0.2–1)
BUN SERPL-MCNC: 14 MG/DL (ref 6–20)
CALCIUM UR-MCNC: 9.5 MG/DL (ref 8.5–10.3)
CHLORIDE SERPL-SCNC: 102 MMOL/L (ref 101–111)
CLARITY UR REFRACT.AUTO: CLEAR
CO2 SERPL-SCNC: 23 MMOL/L (ref 21–32)
CREAT SERPLBLD-SCNC: 0.7 MG/DL (ref 0.4–1)
EOSINOPHIL # BLD AUTO: 0.2 10^3/UL (ref 0–0.7)
EOSINOPHIL NFR BLD AUTO: 2.2 %
ERYTHROCYTE [DISTWIDTH] IN BLOOD BY AUTOMATED COUNT: 12.7 % (ref 12–15)
GFRSERPLBLD MDRD-ARVRAT: 93 ML/MIN/{1.73_M2} (ref 89–?)
GLOBULIN SER-MCNC: 3.4 G/DL (ref 2.1–4.2)
GLUCOSE SERPL-MCNC: 120 MG/DL (ref 70–100)
GLUCOSE UR QL STRIP.AUTO: NEGATIVE MG/DL
HCG UR QL: NEGATIVE
HCT VFR BLD AUTO: 45.3 % (ref 37–47)
HGB UR QL STRIP: 15.3 G/DL (ref 12–16)
KETONES UR QL STRIP.AUTO: NEGATIVE MG/DL
LIPASE SERPL-CCNC: 35 U/L (ref 22–51)
LYMPHOCYTES # SPEC AUTO: 2.4 10^3/UL (ref 1.5–3.5)
LYMPHOCYTES NFR BLD AUTO: 23.6 %
MCH RBC QN AUTO: 29.7 PG (ref 27–31)
MCHC RBC AUTO-ENTMCNC: 33.8 G/DL (ref 32–36)
MCV RBC AUTO: 88 FL (ref 81–99)
MONOCYTES # BLD AUTO: 0.7 10^3/UL (ref 0–1)
MONOCYTES NFR BLD AUTO: 6.9 %
NEUTROPHILS # BLD AUTO: 6.8 10^3/UL (ref 1.5–6.6)
NEUTROPHILS # SNV AUTO: 10.1 X10^3/UL (ref 4.8–10.8)
NEUTROPHILS NFR BLD AUTO: 66.6 %
NITRITE UR QL STRIP.AUTO: NEGATIVE
NRBC # BLD AUTO: 0 /100WBC
NRBC # BLD AUTO: 0 X10^3/UL
PDW BLD AUTO: 9.7 FL (ref 7.9–10.8)
PH UR STRIP.AUTO: 6.5 PH (ref 5–7.5)
PLATELET # BLD: 245 10^3/UL (ref 130–450)
POTASSIUM SERPL-SCNC: 3.4 MMOL/L (ref 3.5–5)
PROT SPEC-MCNC: 7.8 G/DL (ref 6.7–8.2)
PROT UR STRIP.AUTO-MCNC: NEGATIVE MG/DL
RBC # UR STRIP.AUTO: NEGATIVE /UL
RBC # URNS HPF: (no result) /HPF (ref 0–5)
RBC MAR: 5.15 10^6/UL (ref 4.2–5.4)
SODIUM SERPLBLD-SCNC: 136 MMOL/L (ref 135–145)
SP GR UR STRIP.AUTO: 1.01 (ref 1–1.03)
SQUAMOUS URNS QL MICRO: (no result)
UROBILINOGEN UR QL STRIP.AUTO: (no result) E.U./DL
UROBILINOGEN UR STRIP.AUTO-MCNC: NEGATIVE MG/DL
WBC # UR MANUAL: (no result) /HPF (ref 0–5)

## 2022-11-01 PROCEDURE — 93005 ELECTROCARDIOGRAM TRACING: CPT

## 2022-11-01 PROCEDURE — 87086 URINE CULTURE/COLONY COUNT: CPT

## 2022-11-01 PROCEDURE — 99284 EMERGENCY DEPT VISIT MOD MDM: CPT

## 2022-11-01 PROCEDURE — 99282 EMERGENCY DEPT VISIT SF MDM: CPT

## 2022-11-01 PROCEDURE — 71045 X-RAY EXAM CHEST 1 VIEW: CPT

## 2022-11-01 PROCEDURE — 81001 URINALYSIS AUTO W/SCOPE: CPT

## 2022-11-01 PROCEDURE — 80053 COMPREHEN METABOLIC PANEL: CPT

## 2022-11-01 PROCEDURE — 85025 COMPLETE CBC W/AUTO DIFF WBC: CPT

## 2022-11-01 PROCEDURE — 36415 COLL VENOUS BLD VENIPUNCTURE: CPT

## 2022-11-01 PROCEDURE — 84702 CHORIONIC GONADOTROPIN TEST: CPT

## 2022-11-01 PROCEDURE — 96374 THER/PROPH/DIAG INJ IV PUSH: CPT

## 2022-11-01 PROCEDURE — 83690 ASSAY OF LIPASE: CPT

## 2022-11-01 PROCEDURE — 81025 URINE PREGNANCY TEST: CPT

## 2022-11-01 NOTE — ED PHYSICIAN DOCUMENTATION
History of Present Illness





- Stated complaint


Stated Complaint: CHILLS/SWEATS/SHAKING





- Chief complaint


Chief Complaint: General





- History obtained from


History obtained from: Patient





- Additonal information


Additional information: 


37yF with pmh acanthosis nigrans, CAH 21 def, p/w chills, nausea, and dizziness.

she was in waiting room with SO when she slumped over next to him and either had

syncopal or presyncopal episode just pta in ED. Note patient was found to have 

iron deficiency on labwork performed this past friday with her gyn. patient 

denies abdominal pain or vaginal bleeding at present but states she had 

miscarriage last year requiring emergent d and c. denies urinary sx. 





Review of Systems


Ten Systems: 10 systems reviewed and negative


Constitutional: reports: Chills, Fatigue


GI: reports: Nausea.  denies: Abdominal Pain





PD PAST MEDICAL HISTORY





- Past Medical History


Past Medical History: Yes


Cardiovascular: High cholesterol


Respiratory: None


Neuro: Headaches


Endocrine/Autoimmune: Other


GI: GERD


GYN: Ovarian cysts


: None


HEENT: None


Psych: Depression, Anxiety, Panic attacks


Musculoskeletal: None


Derm: Rosacea





- Past Surgical History


Past Surgical History: Yes


General: Other


/GYN: Dilation and currettage, Oophrectomy





- Present Medications


Home Medications: 


                                Ambulatory Orders











 Medication  Instructions  Recorded  Confirmed


 


Ibuprofen [Motrin] 600 mg PO Q6H PRN #30 tab 12/23/20 11/01/22


 


Fluticasone [Flonase] 120 sprays MAGDI PRN PRN 04/22/22 11/01/22


 


Iron/Mfolate/C/E/B12/Biot/Kiara 1 each PO DAILY 11/01/22 11/01/22





[Hemax Tablet]   














- Allergies


Allergies/Adverse Reactions: 


                                    Allergies











Allergy/AdvReac Type Severity Reaction Status Date / Time


 


bromhexine Allergy Severe problems Verified 11/01/22 18:28





   w/ airway  


 


cephalexin Allergy  Hives Verified 11/01/22 18:28


 


ciprofloxacin [From Cipro] Allergy  Rash Verified 11/01/22 18:28


 


codeine Allergy  Hives Verified 11/01/22 18:28


 


fluconazole Allergy  Respiratory Verified 11/01/22 18:28


 


minocycline Allergy  Respiratory Verified 11/01/22 18:28


 


Sulfa (Sulfonamide Allergy  Unknown Verified 11/01/22 18:28





Antibiotics)     


 


sulfamethoxazole Allergy  Respiratory Verified 11/01/22 18:28





[From Bactrim]     


 


Tetracyclines Allergy  Respiratory Verified 11/01/22 18:28


 


trimethoprim [From Bactrim] Allergy  Respiratory Verified 11/01/22 18:28


 


hydromorphone [From Dilaudid] AdvReac  Anxiety Verified 11/01/22 18:28


 


oxycodone AdvReac  Unknown Verified 11/01/22 18:28














- Social History


Does the pt smoke?: No


Smoking Status: Former smoker


Does the pt drink ETOH?: No


Does the pt have substance abuse?: No





- Immunizations


Immunizations are current?: Yes





- POLST


Patient has POLST: No





PD ED PE NORMAL





- Vitals


Vital signs reviewed: Yes





- General


General: Alert and oriented X 3, No acute distress, Well developed/nourished





- HEENT


HEENT: Atraumatic, PERRL, EOMI, Moist mucous membranes, Pharynx benign





- Neck


Neck: Supple, no meningeal sign





- Cardiac


Cardiac: RRR





- Respiratory


Respiratory: No respiratory distress, Clear bilaterally





- Abdomen


Abdomen: Non tender, Non distended





- Derm


Derm: Normal color, Warm and dry





- Extremities


Extremities: No deformity





- Neuro


Neuro: Alert and oriented X 3, No motor deficit, No sensory deficit





- Psych


Psych: Normal mood, Normal affect





Results





- Vitals


Vitals: 


                               Vital Signs - 24 hr











  11/01/22 11/01/22 11/01/22





  18:28 18:36 20:36


 


Temperature 36.6 C 36.6 C 


 


Heart Rate 79 79 78


 


Respiratory 16 16 18





Rate   


 


Blood Pressure 137/80 H 137/80 H 121/73


 


O2 Saturation 100 100 100














  11/01/22





  22:19


 


Temperature 37 C


 


Heart Rate 69


 


Respiratory 





Rate 


 


Blood Pressure 130/97 H


 


O2 Saturation 








                                     Oxygen











O2 Source                      Room air

















- EKG (time done)


  ** 1942


Rate: Rate (enter#) (84)


Rhythm: NSR


Axis: Normal


Intervals: Normal SD


QRS: Normal


Ischemia: Normal ST segments





- Labs


Labs: 


                                Laboratory Tests











  11/01/22 11/01/22 11/01/22





  19:30 19:30 19:30


 


WBC  10.1  


 


RBC  5.15  


 


Hgb  15.3  


 


Hct  45.3  


 


MCV  88.0  


 


MCH  29.7  


 


MCHC  33.8  


 


RDW  12.7  


 


Plt Count  245  


 


MPV  9.7  


 


Neut # (Auto)  6.8 H  


 


Lymph # (Auto)  2.4  


 


Mono # (Auto)  0.7  


 


Eos # (Auto)  0.2  


 


Baso # (Auto)  0.1  


 


Absolute Nucleated RBC  0.00  


 


Nucleated RBC %  0.0  


 


Sodium   136 


 


Potassium   3.4 L 


 


Chloride   102 


 


Carbon Dioxide   23 


 


Anion Gap   11.0 


 


BUN   14 


 


Creatinine   0.7 


 


Estimated GFR (MDRD)   93 


 


Glucose   120 H 


 


Calcium   9.5 


 


Total Bilirubin   0.2 


 


AST   29 


 


ALT   34 


 


Alkaline Phosphatase   59 


 


Total Protein   7.8 


 


Albumin   4.4 


 


Globulin   3.4 


 


Albumin/Globulin Ratio   1.3 


 


Lipase   35 


 


HCG, Quant    0.82


 


Urine Color   


 


Urine Clarity   


 


Urine pH   


 


Ur Specific Gravity   


 


Urine Protein   


 


Urine Glucose (UA)   


 


Urine Ketones   


 


Urine Occult Blood   


 


Urine Nitrite   


 


Urine Bilirubin   


 


Urine Urobilinogen   


 


Ur Leukocyte Esterase   


 


Urine RBC   


 


Urine WBC   


 


Ur Squamous Epith Cells   


 


Urine Bacteria   


 


Urine Culture Comments   


 


Urine HCG, Qual   














  11/01/22





  19:53


 


WBC 


 


RBC 


 


Hgb 


 


Hct 


 


MCV 


 


MCH 


 


MCHC 


 


RDW 


 


Plt Count 


 


MPV 


 


Neut # (Auto) 


 


Lymph # (Auto) 


 


Mono # (Auto) 


 


Eos # (Auto) 


 


Baso # (Auto) 


 


Absolute Nucleated RBC 


 


Nucleated RBC % 


 


Sodium 


 


Potassium 


 


Chloride 


 


Carbon Dioxide 


 


Anion Gap 


 


BUN 


 


Creatinine 


 


Estimated GFR (MDRD) 


 


Glucose 


 


Calcium 


 


Total Bilirubin 


 


AST 


 


ALT 


 


Alkaline Phosphatase 


 


Total Protein 


 


Albumin 


 


Globulin 


 


Albumin/Globulin Ratio 


 


Lipase 


 


HCG, Quant 


 


Urine Color  YELLOW


 


Urine Clarity  CLEAR


 


Urine pH  6.5


 


Ur Specific Gravity  1.010


 


Urine Protein  NEGATIVE


 


Urine Glucose (UA)  NEGATIVE


 


Urine Ketones  NEGATIVE


 


Urine Occult Blood  NEGATIVE


 


Urine Nitrite  NEGATIVE


 


Urine Bilirubin  NEGATIVE


 


Urine Urobilinogen  0.2 (NORMAL)


 


Ur Leukocyte Esterase  NEGATIVE


 


Urine RBC  0-5


 


Urine WBC  0-3


 


Ur Squamous Epith Cells  RARE Squamous


 


Urine Bacteria  Rare


 


Urine Culture Comments  NOT INDICATED


 


Urine HCG, Qual  NEGATIVE














PD MEDICAL DECISION MAKING





- ED course


ED course: 





37-year-old woman presented with possible syncopal or presyncopal episode in the

emergency department waiting room.  Her significant other states that she has 

been having chills, nausea, and dizzy spells and was told that she needed to 

take iron supplements at an appointment with her GYN on Friday. patient with 

unremarkable labwork and vitals. feeling better s/p zofran. declined script. 

return precautions given. patient has pcp appointment this tuesday. 





Departure





- Departure


Disposition: 01 Home, Self Care


Clinical Impression: 


 Pre-syncope, Chills, Nausea





Condition: Stable


Instructions:  ED Near Syncope Unkn


Comments: 


Seen in the emergency department for evaluation of nausea, dizziness, chills, 

and a near fainting episode. Your lab work and vital signs were normal.  Please 

follow-up with your primary care provider this week and return to the emergency 

department if you have any new or worsening symptoms or other concerns.


Discharge Date/Time: 11/01/22 22:40

## 2022-11-01 NOTE — XRAY REPORT
PROCEDURE:  Chest 1 View X-Ray

 

INDICATIONS:  Chest Pain

 

TECHNIQUE:  One view of the chest was acquired.  

 

COMPARISON:  10/5/2021

 

FINDINGS:  

 

Surgical changes and devices:  None.  

 

Lungs and pleura:  No pleural effusions or pneumothorax.  Lungs are clear.  

 

Mediastinum:  Mediastinal contours appear normal.  Heart size is normal.  

 

Bones and chest wall:  No suspicious bony lesions.  Overlying soft tissues appear unremarkable.  

 

 

IMPRESSION:  

 

1. No acute cardiopulmonary disease.

 

Reviewed by: Steve Ching MD on 11/1/2022 8:45 PM PDT

Approved by: Steve Ching MD on 11/1/2022 8:45 PM PDT

 

 

Station ID:  [id number]

## 2022-11-09 ENCOUNTER — HOSPITAL ENCOUNTER (OUTPATIENT)
Dept: HOSPITAL 76 - LAB.N | Age: 37
Discharge: HOME | End: 2022-11-09
Payer: COMMERCIAL

## 2022-11-09 DIAGNOSIS — Z53.9: Primary | ICD-10-CM

## 2022-11-11 ENCOUNTER — HOSPITAL ENCOUNTER (OUTPATIENT)
Dept: HOSPITAL 76 - LAB | Age: 37
Discharge: HOME | End: 2022-11-11
Attending: NURSE PRACTITIONER
Payer: COMMERCIAL

## 2022-11-11 DIAGNOSIS — R97.1: ICD-10-CM

## 2022-11-11 DIAGNOSIS — R53.83: Primary | ICD-10-CM

## 2022-11-11 DIAGNOSIS — E53.8: ICD-10-CM

## 2022-11-11 LAB
CANCER AG125 SERPL-ACNC: 18.5 U/ML (ref 0–35)
VIT B12 SERPL-MCNC: 544 PG/ML (ref 180–914)

## 2022-11-11 PROCEDURE — 85651 RBC SED RATE NONAUTOMATED: CPT

## 2022-11-11 PROCEDURE — 36415 COLL VENOUS BLD VENIPUNCTURE: CPT

## 2022-11-11 PROCEDURE — 82607 VITAMIN B-12: CPT

## 2022-11-11 PROCEDURE — 86304 IMMUNOASSAY TUMOR CA 125: CPT

## 2022-11-11 PROCEDURE — 86140 C-REACTIVE PROTEIN: CPT

## 2023-01-03 ENCOUNTER — HOSPITAL ENCOUNTER (OUTPATIENT)
Dept: HOSPITAL 76 - MAC.MOP | Age: 38
Discharge: HOME | End: 2023-01-03
Attending: NURSE PRACTITIONER
Payer: COMMERCIAL

## 2023-01-03 DIAGNOSIS — Z53.9: Primary | ICD-10-CM

## 2023-01-11 ENCOUNTER — HOSPITAL ENCOUNTER (OUTPATIENT)
Dept: HOSPITAL 76 - MAC.MOP | Age: 38
Discharge: HOME | End: 2023-01-11
Attending: NURSE PRACTITIONER
Payer: COMMERCIAL

## 2023-01-11 DIAGNOSIS — R00.2: Primary | ICD-10-CM

## 2023-02-15 ENCOUNTER — HOSPITAL ENCOUNTER (OUTPATIENT)
Dept: HOSPITAL 76 - DI | Age: 38
Discharge: HOME | End: 2023-02-15
Payer: COMMERCIAL

## 2023-02-15 DIAGNOSIS — N83.292: ICD-10-CM

## 2023-02-15 DIAGNOSIS — R79.89: Primary | ICD-10-CM

## 2023-02-15 LAB
BASOPHILS NFR BLD AUTO: 0.1 10^3/UL (ref 0–0.1)
BASOPHILS NFR BLD AUTO: 0.6 %
EOSINOPHIL # BLD AUTO: 0.2 10^3/UL (ref 0–0.7)
EOSINOPHIL NFR BLD AUTO: 2.6 %
ERYTHROCYTE [DISTWIDTH] IN BLOOD BY AUTOMATED COUNT: 12.1 % (ref 12–15)
HCT VFR BLD AUTO: 48.6 % (ref 37–47)
HGB UR QL STRIP: 16.1 G/DL (ref 12–16)
LYMPHOCYTES # SPEC AUTO: 3.4 10^3/UL (ref 1.5–3.5)
LYMPHOCYTES NFR BLD AUTO: 36.4 %
MCH RBC QN AUTO: 30.7 PG (ref 27–31)
MCHC RBC AUTO-ENTMCNC: 33.1 G/DL (ref 32–36)
MCV RBC AUTO: 92.6 FL (ref 81–99)
MONOCYTES # BLD AUTO: 0.6 10^3/UL (ref 0–1)
MONOCYTES NFR BLD AUTO: 6 %
NEUTROPHILS # BLD AUTO: 5 10^3/UL (ref 1.5–6.6)
NEUTROPHILS # SNV AUTO: 9.3 X10^3/UL (ref 4.8–10.8)
NEUTROPHILS NFR BLD AUTO: 54.2 %
NRBC # BLD AUTO: 0 /100WBC
NRBC # BLD AUTO: 0 X10^3/UL
PDW BLD AUTO: 10 FL (ref 7.9–10.8)
PLATELET # BLD: 221 10^3/UL (ref 130–450)
RBC MAR: 5.25 10^6/UL (ref 4.2–5.4)

## 2023-02-15 PROCEDURE — 82728 ASSAY OF FERRITIN: CPT

## 2023-02-15 PROCEDURE — 85025 COMPLETE CBC W/AUTO DIFF WBC: CPT

## 2023-02-15 PROCEDURE — 36415 COLL VENOUS BLD VENIPUNCTURE: CPT

## 2023-02-17 NOTE — ULTRASOUND REPORT
PROCEDURE:  Pelvic Complete

 

INDICATIONS:  LEFT OVARIAN CYST

 

TECHNIQUE:  

Real-time transabdominal scanning was performed of the pelvic organs, with image documentation.  

 

COMPARISON:  Ultrasound pelvis, 11/14/2022.

 

FINDINGS:  

 

Uterus:  Uterus is normal in size at 8.2 x 3.5 x 5.7 cm.  Endometrium measures 9 mm in combined thick
ness. Note is made of nabothian cysts in cervix.

 

Ovaries:  Right ovary is surgically absent. Left ovary measures 3.5 x 2.5 x 3.7 cm with an asymmetric
 volume of 17.2 cc. Less than 12 ovarian follicles are present. The complex cysts seen on the last ex
am is no longer present. 

 

Other:  No free pelvic fluid.  

 

IMPRESSION:  

 

1. Interval resolution of a complex cyst in left ovary.

 

Reviewed by: Bret Temple MD on 2/17/2023 9:27 AM PST

Approved by: Bret Temple MD on 2/17/2023 9:27 AM PST

 

 

Station ID:  [id number]

## 2023-03-01 ENCOUNTER — HOSPITAL ENCOUNTER (OUTPATIENT)
Dept: HOSPITAL 76 - MAC.INF | Age: 38
Discharge: HOME | End: 2023-03-01
Attending: NURSE PRACTITIONER
Payer: COMMERCIAL

## 2023-03-01 DIAGNOSIS — I49.3: ICD-10-CM

## 2023-03-01 DIAGNOSIS — R00.2: Primary | ICD-10-CM

## 2023-03-01 DIAGNOSIS — I49.1: ICD-10-CM

## 2023-03-01 PROCEDURE — 93248 EXT ECG>7D<15D REV&INTERPJ: CPT

## 2023-05-04 ENCOUNTER — HOSPITAL ENCOUNTER (OUTPATIENT)
Dept: HOSPITAL 76 - LAB | Age: 38
Discharge: HOME | End: 2023-05-04
Payer: COMMERCIAL

## 2023-05-04 DIAGNOSIS — E61.1: ICD-10-CM

## 2023-05-04 DIAGNOSIS — R20.2: Primary | ICD-10-CM

## 2023-05-04 LAB
ALBUMIN DIAFP-MCNC: 4.1 G/DL (ref 3.2–5.5)
ALBUMIN/GLOB SERPL: 1.1 {RATIO} (ref 1–2.2)
ALP SERPL-CCNC: 43 IU/L (ref 42–121)
ALT SERPL W P-5'-P-CCNC: 20 IU/L (ref 10–60)
ANION GAP SERPL CALCULATED.4IONS-SCNC: 9 MMOL/L (ref 6–13)
AST SERPL W P-5'-P-CCNC: 18 IU/L (ref 10–42)
BILIRUB BLD-MCNC: 0.5 MG/DL (ref 0.2–1)
BUN SERPL-MCNC: 13 MG/DL (ref 6–20)
CALCIUM UR-MCNC: 9.5 MG/DL (ref 8.5–10.3)
CHLORIDE SERPL-SCNC: 104 MMOL/L (ref 101–111)
CO2 SERPL-SCNC: 26 MMOL/L (ref 21–32)
CREAT SERPLBLD-SCNC: 0.6 MG/DL (ref 0.4–1)
ERYTHROCYTE [DISTWIDTH] IN BLOOD BY AUTOMATED COUNT: 11.6 % (ref 12–15)
EST. AVERAGE GLUCOSE BLD GHB EST-MCNC: 105 MG/DL (ref 70–100)
FOLATE SERPL-MCNC: 20.97 NG/ML
GFRSERPLBLD MDRD-ARVRAT: 111 ML/MIN/{1.73_M2} (ref 89–?)
GLOBULIN SER-MCNC: 3.6 G/DL (ref 2.1–4.2)
GLUCOSE SERPL-MCNC: 87 MG/DL (ref 70–100)
HBA1C MFR BLD HPLC: 5.3 % (ref 4.27–6.07)
HCT VFR BLD AUTO: 44.5 % (ref 37–47)
HGB UR QL STRIP: 14.9 G/DL (ref 12–16)
MCH RBC QN AUTO: 30.8 PG (ref 27–31)
MCHC RBC AUTO-ENTMCNC: 33.5 G/DL (ref 32–36)
MCV RBC AUTO: 91.9 FL (ref 81–99)
NEUTROPHILS # SNV AUTO: 7.4 X10^3/UL (ref 4.8–10.8)
PDW BLD AUTO: 9.8 FL (ref 7.9–10.8)
PLATELET # BLD: 224 10^3/UL (ref 130–450)
POTASSIUM SERPL-SCNC: 3.8 MMOL/L (ref 3.5–5)
PROT SPEC-MCNC: 7.7 G/DL (ref 6.7–8.2)
RBC MAR: 4.84 10^6/UL (ref 4.2–5.4)
SODIUM SERPLBLD-SCNC: 139 MMOL/L (ref 135–145)
T4 SERPL-MCNC: 7.71 UG/DL (ref 6.09–12.23)
TSH SERPL-ACNC: 1.66 UIU/ML (ref 0.34–5.6)

## 2023-05-04 PROCEDURE — 83036 HEMOGLOBIN GLYCOSYLATED A1C: CPT

## 2023-05-04 PROCEDURE — 84480 ASSAY TRIIODOTHYRONINE (T3): CPT

## 2023-05-04 PROCEDURE — 82728 ASSAY OF FERRITIN: CPT

## 2023-05-04 PROCEDURE — 84436 ASSAY OF TOTAL THYROXINE: CPT

## 2023-05-04 PROCEDURE — 80053 COMPREHEN METABOLIC PANEL: CPT

## 2023-05-04 PROCEDURE — 84443 ASSAY THYROID STIM HORMONE: CPT

## 2023-05-04 PROCEDURE — 85027 COMPLETE CBC AUTOMATED: CPT

## 2023-05-04 PROCEDURE — 36415 COLL VENOUS BLD VENIPUNCTURE: CPT

## 2023-05-04 PROCEDURE — 82746 ASSAY OF FOLIC ACID SERUM: CPT

## 2023-05-20 ENCOUNTER — HOSPITAL ENCOUNTER (EMERGENCY)
Dept: HOSPITAL 76 - ED | Age: 38
Discharge: HOME | End: 2023-05-20
Payer: COMMERCIAL

## 2023-05-20 VITALS — DIASTOLIC BLOOD PRESSURE: 80 MMHG | SYSTOLIC BLOOD PRESSURE: 118 MMHG

## 2023-05-20 DIAGNOSIS — Z87.891: ICD-10-CM

## 2023-05-20 DIAGNOSIS — H66.003: Primary | ICD-10-CM

## 2023-05-20 DIAGNOSIS — J01.00: ICD-10-CM

## 2023-05-20 PROCEDURE — 99283 EMERGENCY DEPT VISIT LOW MDM: CPT

## 2023-05-20 PROCEDURE — 99282 EMERGENCY DEPT VISIT SF MDM: CPT

## 2023-05-20 NOTE — ED PHYSICIAN DOCUMENTATION
PD HPI HEENT





- Stated complaint


Stated Complaint: COUGH/DEHYDRATED/FATIGUE





- Chief complaint


Chief Complaint: Resp





- History obtained from


History obtained from: Patient





- History of Present Illness


Timing - onset: How many weeks ago (1)


Timing - duration: Weeks (1)


Timing - details: Gradual onset, Still present


Location: Sinuses, Nose, Throat


Improves: Medication


Worsens: Swalllowing


Associated symptoms: Congestion, Rhinorrhea, Facial swelling, Headache, Cough


Similar symptoms before: Diagnosis (sinusitis)


Recently seen: Clinic





- Additional information


Additional information: 





37-year-old Alie Hook has developed a cough and congestion she has been 

coughing up yellow and green phlegm now for the past several days.  She 

developed symptoms 1 week ago she did a COVID test followed by a visit to the 

urgent care clinic with a negative rapid strep.  Despite this she has increased 

in her symptoms development of yellow and green phlegm facial pain a sore throat

and a cough.  She is not short of breath.





Review of Systems


Constitutional: reports: Fatigue, Sweats.  denies: Fever


Eyes: denies: Decreased vision


Ears: reports: Ear pain (popping with use of rubén pot)


Nose: reports: Rhinorrhea / runny nose, Congestion, Sinus pressure / pain


Throat: reports: Sore throat


Cardiac: denies: Chest pain / pressure, Palpitations


Respiratory: reports: Cough.  denies: Dyspnea


GI: denies: Vomiting, Diarrhea





PD PAST MEDICAL HISTORY





- Past Medical History


Cardiovascular: High cholesterol


Respiratory: None


Neuro: Headaches


Endocrine/Autoimmune: Other


GI: GERD


GYN: Ovarian cysts


: None


HEENT: None


Psych: Depression, Anxiety, Panic attacks


Musculoskeletal: None


Derm: Rosacea





- Past Surgical History


Past Surgical History: Yes


General: Other


/GYN: Dilation and currettage, Oophrectomy





- Present Medications


Home Medications: 


                                Ambulatory Orders











 Medication  Instructions  Recorded  Confirmed


 


Ibuprofen [Motrin] 600 mg PO Q6H PRN #30 tab 12/23/20 11/11/22


 


Fluticasone [Flonase] 120 sprays MAGDI PRN PRN 04/22/22 11/11/22


 


Iron/Mfolate/C/E/B12/Biot/Kiara 1 each PO DAILY 11/01/22 11/11/22





[Hemax Tablet]   


 


Amox/Clav 875/125 [Augmentin] 1 each PO Q12H #20 tablet 05/20/23 














- Allergies


Allergies/Adverse Reactions: 


                                    Allergies











Allergy/AdvReac Type Severity Reaction Status Date / Time


 


bromhexine Allergy Severe problems Verified 05/20/23 09:27





   w/ airway  


 


cephalexin Allergy  Hives Verified 05/20/23 09:27


 


ciprofloxacin [From Cipro] Allergy  Rash Verified 05/20/23 09:27


 


codeine Allergy  Hives Verified 05/20/23 09:27


 


fluconazole Allergy  Respiratory Verified 05/20/23 09:27


 


minocycline Allergy  Respiratory Verified 05/20/23 09:27


 


Sulfa (Sulfonamide Allergy  Unknown Verified 05/20/23 09:27





Antibiotics)     


 


sulfamethoxazole Allergy  Respiratory Verified 05/20/23 09:27





[From Bactrim]     


 


Tetracyclines Allergy  Respiratory Verified 05/20/23 09:27


 


trimethoprim [From Bactrim] Allergy  Respiratory Verified 05/20/23 09:27


 


hydromorphone [From Dilaudid] AdvReac  Anxiety Verified 05/20/23 09:27


 


oxycodone AdvReac  Unknown Verified 05/20/23 09:27














- Social History


Does the pt smoke?: No


Smoking Status: Former smoker


Does the pt drink ETOH?: No


Does the pt have substance abuse?: No





- Immunizations


Immunizations are current?: Yes





- POLST


Patient has POLST: No





PD ED PE NORMAL





- Vitals


Vital signs reviewed: Yes (hypertensive mild )





- General


General: No acute distress, Well developed/nourished, Other





- HEENT


HEENT: Atraumatic, PERRL, EOMI, Other (The left TM is flush the right is 

erythematous with distortion of landmarks.  Pharynx is generally erythematous 

with swelling and exudate. Sinus point tenderness to the right maxillary sinus)





- Neck


Neck: Supple, no meningeal sign, No bony TTP





- Cardiac


Cardiac: RRR, No murmur





- Respiratory


Respiratory: No respiratory distress, Clear bilaterally





- Abdomen


Abdomen: Soft, Non tender





- Back


Back: No CVA TTP, No spinal TTP





- Derm


Derm: Normal color, Warm and dry, No rash





- Extremities


Extremities: No deformity, No edema





- Neuro


Neuro: Alert and oriented X 3, CNs 2-12 intact, No motor deficit, No sensory 

deficit, Normal speech


Eye Opening: Spontaneous


Motor: Obeys Commands


Verbal: Oriented


GCS Score: 15





- Psych


Psych: Normal mood, Normal affect





Results





- Vitals


Vitals: 





                               Vital Signs - 24 hr











  05/20/23





  09:21


 


Temperature 36.7 C


 


Heart Rate 84


 


Respiratory 16





Rate 


 


Blood Pressure 125/84 H


 


O2 Saturation 100








                                     Oxygen











O2 Source                      Room air

















PD Medical Decision Making





- ED course


Complexity details: considered differential, d/w patient, d/w family


ED course: 





37-year-old female with URI over the past week continues to produce yellow and 

green phlegm she has sinus pressure and pain popping in her ears and a sore 

throat she has inflammation in all 3 areas and exam consistent with otitis media

sinusitis.  She is administered dexamethasone 10 mg orally we will place her on 

some Augmentin. She indicates to me she is able to take penicillin. 





Departure





- Departure


Disposition: 01 Home, Self Care


Clinical Impression: 


Otitis media


Qualifiers:


 Otitis media type: suppurative Chronicity: acute Laterality: bilateral 

Recurrence: not specified as recurrent Spontaneous tympanic membrane rupture: 

without spontaneous rupture Qualified Code(s): H66.003 - Acute suppurative 

otitis media without spontaneous rupture of ear drum, bilateral





Sinusitis


Qualifiers:


 Sinusitis location: maxillary Chronicity: acute Recurrence: not specified as re

current Qualified Code(s): J01.00 - Acute maxillary sinusitis, unspecified





Condition: Stable


Instructions:  ED Sinusitis Abx Tx, ED Otitis Media Acute Adult


Follow-Up: 


Edda Mathews ARNP [Primary Care Provider] - 


Prescriptions: 


Amox/Clav 875/125 [Augmentin] 1 each PO Q12H #20 tablet


Comments: 


Alie, today looks like you have a bacterial superinfection in your sinuses 

and your middle ear.  We have given you a dose of dexamethasone today and we 

expect you to begin to feel better this afternoon.  I have E scribed the 

antibiotic Augmentin to the [facility] in [place].  Our expectation with 

treatment is improvement today and continued improvement.

## 2023-07-01 ENCOUNTER — HOSPITAL ENCOUNTER (EMERGENCY)
Dept: HOSPITAL 76 - ED | Age: 38
Discharge: HOME | End: 2023-07-01
Payer: COMMERCIAL

## 2023-07-01 VITALS — SYSTOLIC BLOOD PRESSURE: 119 MMHG | DIASTOLIC BLOOD PRESSURE: 70 MMHG

## 2023-07-01 DIAGNOSIS — Z87.891: ICD-10-CM

## 2023-07-01 DIAGNOSIS — R07.89: Primary | ICD-10-CM

## 2023-07-01 DIAGNOSIS — K21.9: ICD-10-CM

## 2023-07-01 DIAGNOSIS — E78.00: ICD-10-CM

## 2023-07-01 LAB
ALBUMIN DIAFP-MCNC: 3.7 G/DL (ref 3.2–5.5)
ALBUMIN/GLOB SERPL: 1.1 {RATIO} (ref 1–2.2)
ALP SERPL-CCNC: 43 IU/L (ref 42–121)
ALT SERPL W P-5'-P-CCNC: 23 IU/L (ref 10–60)
ANION GAP SERPL CALCULATED.4IONS-SCNC: 6 MMOL/L (ref 6–13)
AST SERPL W P-5'-P-CCNC: 19 IU/L (ref 10–42)
BASOPHILS NFR BLD AUTO: 0 10^3/UL (ref 0–0.1)
BASOPHILS NFR BLD AUTO: 0.5 %
BILIRUB BLD-MCNC: 0.4 MG/DL (ref 0.2–1)
BUN SERPL-MCNC: 11 MG/DL (ref 6–20)
CALCIUM UR-MCNC: 9 MG/DL (ref 8.5–10.3)
CHLORIDE SERPL-SCNC: 107 MMOL/L (ref 101–111)
CO2 SERPL-SCNC: 25 MMOL/L (ref 21–32)
CREAT SERPLBLD-SCNC: 0.6 MG/DL (ref 0.4–1)
EOSINOPHIL # BLD AUTO: 0.2 10^3/UL (ref 0–0.7)
EOSINOPHIL NFR BLD AUTO: 3 %
ERYTHROCYTE [DISTWIDTH] IN BLOOD BY AUTOMATED COUNT: 12.1 % (ref 12–15)
GFRSERPLBLD MDRD-ARVRAT: 111 ML/MIN/{1.73_M2} (ref 89–?)
GLOBULIN SER-MCNC: 3.4 G/DL (ref 2.1–4.2)
GLUCOSE SERPL-MCNC: 107 MG/DL (ref 70–100)
HCT VFR BLD AUTO: 41.7 % (ref 37–47)
HGB UR QL STRIP: 14.2 G/DL (ref 12–16)
LIPASE SERPL-CCNC: 31 U/L (ref 22–51)
LYMPHOCYTES # SPEC AUTO: 2.3 10^3/UL (ref 1.5–3.5)
LYMPHOCYTES NFR BLD AUTO: 30.5 %
MCH RBC QN AUTO: 30.9 PG (ref 27–31)
MCHC RBC AUTO-ENTMCNC: 34.1 G/DL (ref 32–36)
MCV RBC AUTO: 90.8 FL (ref 81–99)
MONOCYTES # BLD AUTO: 0.6 10^3/UL (ref 0–1)
MONOCYTES NFR BLD AUTO: 7.6 %
NEUTROPHILS # BLD AUTO: 4.3 10^3/UL (ref 1.5–6.6)
NEUTROPHILS # SNV AUTO: 7.4 X10^3/UL (ref 4.8–10.8)
NEUTROPHILS NFR BLD AUTO: 58.3 %
NRBC # BLD AUTO: 0 /100WBC
NRBC # BLD AUTO: 0 X10^3/UL
PDW BLD AUTO: 9.4 FL (ref 7.9–10.8)
PLATELET # BLD: 228 10^3/UL (ref 130–450)
POTASSIUM SERPL-SCNC: 3.7 MMOL/L (ref 3.5–5)
PROT SPEC-MCNC: 7.1 G/DL (ref 6.7–8.2)
RBC MAR: 4.59 10^6/UL (ref 4.2–5.4)
SODIUM SERPLBLD-SCNC: 138 MMOL/L (ref 135–145)

## 2023-07-01 PROCEDURE — 83690 ASSAY OF LIPASE: CPT

## 2023-07-01 PROCEDURE — 85025 COMPLETE CBC W/AUTO DIFF WBC: CPT

## 2023-07-01 PROCEDURE — 80053 COMPREHEN METABOLIC PANEL: CPT

## 2023-07-01 PROCEDURE — 84484 ASSAY OF TROPONIN QUANT: CPT

## 2023-07-01 PROCEDURE — 71045 X-RAY EXAM CHEST 1 VIEW: CPT

## 2023-07-01 PROCEDURE — 99284 EMERGENCY DEPT VISIT MOD MDM: CPT

## 2023-07-01 PROCEDURE — 93005 ELECTROCARDIOGRAM TRACING: CPT

## 2023-07-01 PROCEDURE — 36415 COLL VENOUS BLD VENIPUNCTURE: CPT

## 2023-07-01 NOTE — XRAY REPORT
PROCEDURE:  Chest 1 View X-Ray

 

INDICATIONS:  Chest Pain

 

TECHNIQUE:  One view of the chest was acquired.  

 

COMPARISON:  None.

 

FINDINGS:  

 

Surgical changes and devices:  None.  

 

Lungs and pleura:  No pleural effusions or pneumothorax.  Lungs are clear.  

 

Mediastinum:  Mediastinal contours appear normal.  Heart size is normal.  

 

Bones and chest wall:  No suspicious bony lesions.  Overlying soft tissues appear unremarkable.  

 

 

IMPRESSION:  

 

No acute cardiopulmonary process.

 

 

 

Reviewed by: Parviz Juárez on 7/1/2023 6:54 PM PDT

Approved by: Parviz Juárez on 7/1/2023 6:54 PM PDT

 

 

Station ID:  [id number]

## 2023-07-01 NOTE — ED PHYSICIAN DOCUMENTATION
PD HPI CHEST PAIN





- Stated complaint


Stated Complaint: CHEST PX, DEHYDRATION





- Chief complaint


Chief Complaint: Cardiac





- History obtained from


History obtained from: Patient





- History of Present Illness


Pain level max: 5


Pain level now: 5


Quality: Sharp


Location: Substernal


Radiation: No: Jaw, Neck, Back, Abdominal, Left upper extremity, Right upper 

extremity


Worsened by: Eating, Palpation


Associated symptoms: No: Shortness of air, Diaphoresis, Nausea, Vomiting, 

Feeling faint / dizzy, General Weakness, Palpitations, Cough





- Additional information


Additional information: 





Patient is a 37-year-old female who presents to the emergency department 

complaining of chest pain.  She states that this started about 2 hours ago while

at work.  Described as epigastric abdominal pain that radiates up into her 

chest.  Worse with eating, drinking.  No change with movement.  No fevers.  No 

chills.  Has not taken anything for it.  No nausea or vomiting.  Denies any 

possibility of pregnancy. No cardiac history.  No recent travel.  No leg pain.  

No swelling.





Review of Systems


Constitutional: denies: Fever, Chills


GI: denies: Vomiting, Diarrhea


Skin: denies: Rash


Musculoskeletal: denies: Neck pain, Back pain


Neurologic: denies: Headache





PD PAST MEDICAL HISTORY





- Past Medical History


Cardiovascular: High cholesterol


Respiratory: None


Neuro: Headaches


Endocrine/Autoimmune: Other


GI: GERD


GYN: Ovarian cysts


: None


HEENT: None


Psych: Depression, Anxiety, Panic attacks


Musculoskeletal: None


Derm: Rosacea





- Past Surgical History


Past Surgical History: Yes


General: Other


/GYN: Dilation and currettage, Oophrectomy





- Present Medications


Home Medications: 


                                Ambulatory Orders











 Medication  Instructions  Recorded  Confirmed


 


Ibuprofen [Motrin] 600 mg PO Q6H PRN #30 tab 12/23/20 11/11/22


 


Fluticasone [Flonase] 120 sprays MAGDI PRN PRN 04/22/22 11/11/22


 


Iron/Mfolate/C/E/B12/Biot/Kiara 1 each PO DAILY 11/01/22 11/11/22





[Hemax Tablet]   


 


Amox/Clav 875/125 [Augmentin] 1 each PO Q12H #20 tablet 05/20/23 


 


Esomeprazole Magnesium [Nexium] 40 mg PO DAILY #30 cap 07/01/23 


 


Famotidine [Pepcid] 20 mg PO BID #60 tablet 07/01/23 














- Allergies


Allergies/Adverse Reactions: 


                                    Allergies











Allergy/AdvReac Type Severity Reaction Status Date / Time


 


bromhexine Allergy Severe problems Verified 07/01/23 18:00





   w/ airway  


 


cephalexin Allergy  Hives Verified 07/01/23 18:00


 


ciprofloxacin [From Cipro] Allergy  Rash Verified 07/01/23 18:00


 


codeine Allergy  Hives Verified 07/01/23 18:00


 


fluconazole Allergy  Respiratory Verified 07/01/23 18:00


 


minocycline Allergy  Respiratory Verified 07/01/23 18:00


 


Sulfa (Sulfonamide Allergy  Unknown Verified 07/01/23 18:00





Antibiotics)     


 


sulfamethoxazole Allergy  Respiratory Verified 07/01/23 18:00





[From Bactrim]     


 


Tetracyclines Allergy  Respiratory Verified 07/01/23 18:00


 


trimethoprim [From Bactrim] Allergy  Respiratory Verified 07/01/23 18:00


 


hydromorphone [From Dilaudid] AdvReac  Anxiety Verified 07/01/23 18:00


 


oxycodone AdvReac  Unknown Verified 07/01/23 18:00














- Social History


Does the pt smoke?: No


Smoking Status: Former smoker


Does the pt drink ETOH?: No


Does the pt have substance abuse?: No





- Immunizations


Immunizations are current?: Yes





- POLST


Patient has POLST: No





PD ED PE NORMAL





- Vitals


Vital signs reviewed: Yes





- General


General: Alert and oriented X 3, No acute distress, Well developed/nourished





- HEENT


HEENT: PERRL, Moist mucous membranes





- Neck


Neck: Supple, no meningeal sign





- Cardiac


Cardiac: RRR, Strong equal pulses





- Respiratory


Respiratory: No respiratory distress, Clear bilaterally





- Abdomen


Abdomen: Soft, Non distended, Other (Tender to palpation left upper quadrant and

epigastric without peritoneal signs.  Negative Bradford sign.  Otherwise benign 

abdominal exam)





- Derm


Derm: Warm and dry, No rash





- Extremities


Extremities: No edema, No calf tenderness / cord





- Neuro


Neuro: Alert and oriented X 3





- Psych


Psych: Normal mood, Normal affect





Results





- Vitals


Vitals: 


                               Vital Signs - 24 hr











  07/01/23 07/01/23





  18:00 19:26


 


Temperature 36.3 C L 


 


Heart Rate 77 69


 


Respiratory 18 16





Rate  


 


Blood Pressure 140/81 H 119/70


 


O2 Saturation 100 100








                                     Oxygen











O2 Source                      Room air

















- EKG (time done)


  ** 1811


EKG releavant findings:: 


EKG personally interpreted by author of this note. Relevant findings are: 





Rate: Rate (enter#) (75)


Rhythm: NSR


Axis: Normal


Intervals: Normal CA


QRS: Normal


Ischemia: Normal ST segments





- Labs


Labs: 


                                Laboratory Tests











  07/01/23 07/01/23 07/01/23





  18:39 18:39 18:39


 


WBC  7.4  


 


RBC  4.59  


 


Hgb  14.2  


 


Hct  41.7  


 


MCV  90.8  


 


MCH  30.9  


 


MCHC  34.1  


 


RDW  12.1  


 


Plt Count  228  


 


MPV  9.4  


 


Neut # (Auto)  4.3  


 


Lymph # (Auto)  2.3  


 


Mono # (Auto)  0.6  


 


Eos # (Auto)  0.2  


 


Baso # (Auto)  0.0  


 


Absolute Nucleated RBC  0.00  


 


Nucleated RBC %  0.0  


 


Sodium   138 


 


Potassium   3.7 


 


Chloride   107 


 


Carbon Dioxide   25 


 


Anion Gap   6.0 


 


BUN   11 


 


Creatinine   0.6 


 


Estimated GFR (MDRD)   111 


 


Glucose   107 H 


 


Calcium   9.0 


 


Total Bilirubin   0.4 


 


AST   19 


 


ALT   23 


 


Alkaline Phosphatase   43 


 


Troponin I High Sens    < 2.3 L


 


Total Protein   7.1 


 


Albumin   3.7 


 


Globulin   3.4 


 


Albumin/Globulin Ratio   1.1 


 


Lipase   31 














- Rads (name of study)


  ** Chest x-ray


Relevant Findings:: Final report received, See rad report





PD Medical Decision Making





- ED course


Complexity details: reviewed results, re-evaluated patient, considered 

differential (No ST elevation MI, no aortic dissection, no PE, no tension 

pneumothorax, no aortic aneurysm), d/w patient, d/w family


ED course: 





37-year-old female with what sounds like gastritis versus GERD.  Symptoms 

resolved with GI cocktail and Pepcid.  No acute findings on EKG, chest x-ray or 

laboratory testing.  No evidence of PE.  No evidence of aortic dissection.  We 

will place the patient on a PPI and H2 blocker for home and have her follow-up 

with her doctor.  No evidence of biliary colic.  Negative Bradford sign.  Patient 

counseled regarding signs and symptoms for which I believe and urgent re-

evaluation would be necessary. Patient with good understanding of and agreement 

to plan and is comfortable going home at this time





This document was made in part using voice recognition software. While efforts 

are made to proofread this document, sound alike and grammatical errors may 

occur.





Departure





- Departure


Disposition: 01 Home, Self Care


Clinical Impression: 


Chest pain


Qualifiers:


 Chest pain type: unspecified Qualified Code(s): R07.9 - Chest pain, unspecified





GERD (gastroesophageal reflux disease)


Qualifiers:


 Esophagitis presence: esophagitis presence not specified Qualified Code(s): 

K21.9 - Gastro-esophageal reflux disease without esophagitis





Condition: Good


Instructions:  ED Chest Pain Atypical Unkn Cause, ED GERD


Follow-Up: 


Edda Mathews ARNP [Primary Care Provider] - Within 1 week


Prescriptions: 


Esomeprazole Magnesium [Nexium] 40 mg PO DAILY #30 cap


Famotidine [Pepcid] 20 mg PO BID #60 tablet


Comments: 


Your prescriptions were sent to [facility] in [place].  Please follow-up with 

your doctor for further care.  Your symptoms are consistent with gastritis 

versus GERD today.  Please follow a bland diet.  Avoid fried things, spicy 

things, alcohol, caffeine.  Avoid things such as anti-inflammatory medications 

such as Motrin, ibuprofen and Aleve.  Please return if you worsen.


Discharge Date/Time: 07/01/23 19:55

## 2023-08-25 ENCOUNTER — HOSPITAL ENCOUNTER (OUTPATIENT)
Dept: HOSPITAL 76 - DI | Age: 38
Discharge: HOME | End: 2023-08-25
Payer: COMMERCIAL

## 2023-08-25 DIAGNOSIS — Z12.31: Primary | ICD-10-CM

## 2023-08-25 DIAGNOSIS — R92.8: ICD-10-CM

## 2023-08-28 NOTE — MAMMOGRAPHY REPORT
BILATERAL DIGITAL SCREENING MAMMOGRAM 3D/2D: 8/25/2023

CLINICAL: Routine screening. Baseline exam.  

 

No prior exams were available for comparison.  

 

Both breasts are heterogeneously dense, which may obscure small masses (category c / 51-75% glandular
 tissue).  

 

 

There is a focal asymmetry in the left breast central to the nipple in the retroareolar region.  

There also is a focal asymmetry in the left breast at 5 o'clock middle depth.  

No other significant masses, calcifications, or other findings are seen in either breast.  

 

IMPRESSION: INCOMPLETE: NEEDS ADDITIONAL IMAGING EVALUATION

The focal asymmetry in the left breast central to the nipple in the retroareolar region is indetermin
ate.  Additional views with possible ultrasound are recommended.  

The focal asymmetry in the left breast at 5 o'clock middle depth is indeterminate.  Additional views 
with possible ultrasound are recommended.  

 

 

Based on the Tyrer Cuzick model (a risk assessment model) the patients lifetime risk is 11.6% and he
r 10 year risk is 1.5%. According to the ACR, ACS, and NCCN guidelines, an annual breast MRI exam joel
ng with mammogram is recommended if the patients lifetime risk is 20% or greater.

 

 

This exam was interpreted at Station ID: [id number].  

 

NOTE: For mammograms, a report in lay terms will be sent to the patient. Approximately 15% of breast 
malignancies will not be visualized mammographically. In the management of a palpable breast mass, a 
negative mammogram must not discourage biopsy of a clinically suspicious lesion.

 

Electronically Signed By: Virgilio Sanchez M.D.          

lc/:8/25/2023 15:54:28  

 

 

 

ACR BI-RADS Category 0: Incomplete 3340F

PARENCHYMAL PATTERN: (D) - The breast(s) demonstrate(s) heterogeneously dense fibroglandular parenchy
ma.

BI-RADS CATEGORY: (0) - 0

Mammo and US

[id number]

Immediate follow-up

LATERALITY: (B)

## 2023-09-18 ENCOUNTER — HOSPITAL ENCOUNTER (OUTPATIENT)
Dept: HOSPITAL 76 - DI | Age: 38
Discharge: HOME | End: 2023-09-18
Payer: COMMERCIAL

## 2023-09-18 DIAGNOSIS — R92.8: Primary | ICD-10-CM

## 2023-09-19 NOTE — ULTRASOUND REPORT
LIMITED ULTRASOUND OF LEFT BREAST: 9/18/2023

CLINICAL: Patient returns today to evaluate a focal asymmetry in the left breast.  

 

Comparison is made to exam dated:  8/25/2023 mammogram - [facility].  

 Color flow ultrasound of the left breast 5 o'clock, and retroareolar regions was performed.  Gray sc
socrates images of the real-time examination were reviewed.  

 

No significant abnormalities were seen sonographically in the left breast.  

 

IMPRESSION: PROBABLY BENIGN 

There is no abnormality seen in the left breast to correspond with the mammography findings (focal as
ymmetry retroareolar and lower outer quadrant) 

 

Follow-up mammogram in 6 months is recommended to demonstrate stability.  

 

 

This exam was interpreted at Station ID: [id number].  

Electronically Signed By: Virgilio Sanchez M.D.  

lc/:9/18/2023 13:39:45  

 

 

 

Ultrasound BI-RADS: 3 Probably benign

BI-RADS CATEGORY: (3) - 3

Mammogram

[id number]

6 month follow-up

LATERALITY: (B)

## 2023-09-20 NOTE — MAMMOGRAPHY REPORT
UNILATERAL LEFT DIGITAL DIAGNOSTIC MAMMOGRAM 3D/2D WITH SPOT COMPRESSION: 9/18/2023

CLINICAL: Patient returns today to evaluate two focal asymmetries in the left breast.  

 

Comparison is made to exam dated:  8/25/2023 mammogram - [facility].  

 

The left breast is heterogeneously dense, which may obscure small masses (category c / 51-75% glandul
ar tissue).  

 

There is a focal asymmetry in the left breast central to the nipple in the retroareolar region.  

There also is a focal asymmetry in the left breast at 5 o'clock middle depth.  This is seen in additi
onal views.  

No other significant masses or calcifications are seen in the breast.  

 

IMPRESSION: INCOMPLETE: NEEDS ADDITIONAL IMAGING EVALUATION

The focal asymmetry in the left breast central to the nipple in the retroareolar region is indetermin
ate.  An ultrasound is recommended.  

The focal asymmetry in the left breast at 5 o'clock middle depth is indeterminate.  An ultrasound is 
recommended.  

 

Based on the Tyrer Cuzick model (a risk assessment model) the patients lifetime risk is 11.9% and he
r 10 year risk is 1.6%. According to the ACR, ACS, and NCCN guidelines, an annual breast MRI exam joel
ng with mammogram is recommended if the patients lifetime risk is 20% or greater.

 

 

This exam was interpreted at Station ID: [id number].  

 

NOTE: For mammograms, a report in lay terms will be sent to the patient. Approximately 15% of breast 
malignancies will not be visualized mammographically. In the management of a palpable breast mass, a 
negative mammogram must not discourage biopsy of a clinically suspicious lesion.

 

Electronically Signed By: Virgilio Sanchez M.D.          

lc/:9/20/2023 09:35:48  

 

 

 

ACR BI-RADS Category 0: Incomplete 3340F

PARENCHYMAL PATTERN: (D) - The breast(s) demonstrate(s) heterogeneously dense fibroglandular parenchy
ma.

BI-RADS CATEGORY: (0) - 0

Ultrasound

[id number]

Immediate follow-up

LATERALITY: (B)

## 2023-10-23 ENCOUNTER — HOSPITAL ENCOUNTER (OUTPATIENT)
Dept: HOSPITAL 76 - LAB | Age: 38
Discharge: HOME | End: 2023-10-23
Payer: COMMERCIAL

## 2023-10-23 ENCOUNTER — HOSPITAL ENCOUNTER (OUTPATIENT)
Dept: HOSPITAL 76 - DI | Age: 38
Discharge: HOME | End: 2023-10-23
Payer: COMMERCIAL

## 2023-10-23 DIAGNOSIS — E61.1: Primary | ICD-10-CM

## 2023-10-23 DIAGNOSIS — N83.292: Primary | ICD-10-CM

## 2023-10-23 DIAGNOSIS — Z90.721: ICD-10-CM

## 2023-10-23 DIAGNOSIS — E61.1: ICD-10-CM

## 2023-10-23 LAB
ERYTHROCYTE [DISTWIDTH] IN BLOOD BY AUTOMATED COUNT: 12.3 % (ref 12–15)
HCT VFR BLD AUTO: 47.3 % (ref 37–47)
HGB UR QL STRIP: 16 G/DL (ref 12–16)
MCH RBC QN AUTO: 31.1 PG (ref 27–31)
MCHC RBC AUTO-ENTMCNC: 33.8 G/DL (ref 32–36)
MCV RBC AUTO: 92 FL (ref 81–99)
NEUTROPHILS # SNV AUTO: 9.7 X10^3/UL (ref 4.8–10.8)
PDW BLD AUTO: 9.8 FL (ref 7.9–10.8)
PLATELET # BLD: 244 10^3/UL (ref 130–450)
RBC MAR: 5.14 10^6/UL (ref 4.2–5.4)

## 2023-10-23 PROCEDURE — 85027 COMPLETE CBC AUTOMATED: CPT

## 2023-10-23 PROCEDURE — 82728 ASSAY OF FERRITIN: CPT

## 2023-10-23 PROCEDURE — 36415 COLL VENOUS BLD VENIPUNCTURE: CPT

## 2023-10-24 NOTE — ULTRASOUND REPORT
PROCEDURE:  Pelvic Complete

 

INDICATIONS:  OVARIAN CYST. Prior right salpingectomy and right oophorectomy. Left lower quadrant kalpana
n.

 

TECHNIQUE:  

Real-time transabdominal scanning was performed of the pelvic organs, with image documentation.  

 

COMPARISON:  Pelvic ultrasound 2/15/2023

 

FINDINGS:  

 

Uterus:  Uterus is anteverted and normal in size at 8.6 x 4.0 x 4.3 cm.  The myometrium is homogeneou
s.  The endometrium measures 8 mm in combined thickness.  

 

Ovaries:  The right ovary is surgically absent.  The left ovary measures 3.2 x 2.3 x 2.8 cm, with a c
alculated ovarian volume of 11 cc. The left ovary is unremarkable in appearance.  No adnexal masses a
re seen. No cystic lesions measuring greater than 3 cm.

 

Other:  No free pelvic fluid.  

 

IMPRESSION:  

1. Unremarkable appearance of the left ovary. No definite complex cyst identified.

2. Prior right oophorectomy.

 

 

Reviewed by: Selvin Webb MD on 10/24/2023 1:47 PM PDT

Approved by: Selvin Webb MD on 10/24/2023 1:47 PM PDT

 

 

Station ID:  [id number]

## 2023-10-27 ENCOUNTER — HOSPITAL ENCOUNTER (EMERGENCY)
Dept: HOSPITAL 76 - ED | Age: 38
Discharge: HOME | End: 2023-10-27
Payer: COMMERCIAL

## 2023-10-27 VITALS — DIASTOLIC BLOOD PRESSURE: 82 MMHG | SYSTOLIC BLOOD PRESSURE: 147 MMHG | OXYGEN SATURATION: 97 %

## 2023-10-27 DIAGNOSIS — M75.81: ICD-10-CM
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
## 2023-10-27 DIAGNOSIS — S49.91XA: Primary | ICD-10-CM
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
## 2023-10-27 PROCEDURE — 99283 EMERGENCY DEPT VISIT LOW MDM: CPT

## 2023-10-27 PROCEDURE — 73030 X-RAY EXAM OF SHOULDER: CPT

## 2023-10-27 NOTE — ED PHYSICIAN DOCUMENTATION
PD HPI UPPER EXT INJURY





- Stated complaint


Stated Complaint: R SHOULDER PX





- Chief complaint


Chief Complaint: Ext Problem





- History obtained from


History obtained from: Patient





- Additonal information


Additional information: 


Patient is a 38-year-old female presenting for evaluation of right shoulder pain

starting this afternoon.  Patient is unsure of injury but notes that she was 

being pulled by dogs on leashes at work and it has been hurting since that time.

She denies fall or other trauma.  She does have some chronic shoulder pain b/l. 

It is worse with certain ranges of motion.  She has not taken anything for the 

pain.








Review of Systems


Musculoskeletal: reports: Extremity pain





PD PAST MEDICAL HISTORY





- Past Medical History


Cardiovascular: High cholesterol


Respiratory: None


Neuro: Headaches


Endocrine/Autoimmune: Other


GI: GERD


GYN: Ovarian cysts


: None


HEENT: None


Psych: Depression, Anxiety, Panic attacks


Musculoskeletal: None


Derm: Rosacea





- Past Surgical History


Past Surgical History: Yes


General: Other


/GYN: Dilation and currettage, Oophrectomy





- Present Medications


Home Medications: 


                                Ambulatory Orders











 Medication  Instructions  Recorded  Confirmed


 


Ibuprofen [Motrin] 600 mg PO Q6H PRN #30 tab 12/23/20 11/11/22


 


Fluticasone [Flonase] 120 sprays MAGDI PRN PRN 04/22/22 11/11/22


 


Iron/Mfolate/C/E/B12/Biot/Kiara 1 each PO DAILY 11/01/22 11/11/22





[Hemax Tablet]   


 


Amox/Clav 875/125 [Augmentin] 1 each PO Q12H #20 tablet 05/20/23 


 


Esomeprazole Magnesium [Nexium] 40 mg PO DAILY #30 cap 07/01/23 


 


Famotidine [Pepcid] 20 mg PO BID #60 tablet 07/01/23 


 


Cyclobenzaprine [Flexeril] 10 mg PO TID PRN #20 tablet 10/27/23 


 


Lidocaine Patch 5% [Lidoderm Patch] 1 patch TOP DAILY PRN #10 patch 10/27/23 














- Allergies


Allergies/Adverse Reactions: 


                                    Allergies











Allergy/AdvReac Type Severity Reaction Status Date / Time


 


bromhexine Allergy Severe problems Verified 10/27/23 19:49





   w/ airway  


 


cephalexin Allergy  Hives Verified 10/27/23 19:49


 


ciprofloxacin [From Cipro] Allergy  Rash Verified 10/27/23 19:49


 


codeine Allergy  Hives Verified 10/27/23 19:49


 


fluconazole Allergy  Respiratory Verified 10/27/23 19:49


 


minocycline Allergy  Respiratory Verified 10/27/23 19:49


 


Sulfa (Sulfonamide Allergy  Unknown Verified 10/27/23 19:49





Antibiotics)     


 


sulfamethoxazole Allergy  Respiratory Verified 10/27/23 19:49





[From Bactrim]     


 


Tetracyclines Allergy  Respiratory Verified 10/27/23 19:49


 


trimethoprim [From Bactrim] Allergy  Respiratory Verified 10/27/23 19:49


 


hydromorphone [From Dilaudid] AdvReac  Anxiety Verified 10/27/23 19:49


 


oxycodone AdvReac  Unknown Verified 10/27/23 19:49














- Social History


Does the pt smoke?: No


Smoking Status: Never smoker


Does the pt drink ETOH?: No


Does the pt have substance abuse?: No





- Immunizations


Immunizations are current?: Yes





- POLST


Patient has POLST: No





PD ED PE NORMAL





- General


General: Alert and oriented X 3, No acute distress, Well developed/nourished





- HEENT


HEENT: Atraumatic





- Neck


Neck: Supple, no meningeal sign, No bony TTP





- Cardiac


Cardiac: Strong equal pulses





- Respiratory


Respiratory: No respiratory distress





- Extremities


Extremities: Other (Tightness over right trapezius, tenderness to right shoulder

with pain on range of motion, pain particularly after abduction past 90 degrees,

able to touch opposing shoulder with right hand)





Results





- Vitals


Vitals: 


                               Vital Signs - 24 hr











  10/27/23





  19:45


 


Temperature 36.6 C


 


Heart Rate 78


 


Respiratory 18





Rate 


 


Blood Pressure 147/82 H


 


O2 Saturation 97








                                     Oxygen











O2 Source                      Room air

















PD Medical Decision Making





- ED course


Complexity details: reviewed results, re-evaluated patient, d/w patient


ED course: 


Patient is a 38-year-old female presenting for evaluation of right shoulder 

pain.  Believes she strained it at work today.  No visible deformity but does 

have a spasm to the overlying musculature.  No cervical tenderness.An x-ray was 

obtained which I reviewed I see no fracture or dislocation but does she have 

findings of tendinitis. History also does not suggest a dislocation. Patient was

given a sling but also counseled on range of motion exercises.  She was also 

given prescriptions for lidocaine patches and Flexeril and counseled on 

continued supportive care.  She is advised on concerning symptoms to return for.








Departure





- Departure


Disposition: 01 Home, Self Care


Clinical Impression: 


 Right shoulder injury





Condition: Stable


Instructions:  ED Shoulder Pain [facility]


Prescriptions: 


Cyclobenzaprine [Flexeril] 10 mg PO TID PRN #20 tablet


 PRN Reason: Spasms


Lidocaine Patch 5% [Lidoderm Patch] 1 patch TOP DAILY PRN #10 patch


 PRN Reason: pain


Comments: 


Your shoulder x-ray does not show a broken bone or dislocation.  However you do 

have findings of tendinitis.  You also appear to have a muscle spasm likely 

causing your symptoms tonight.  We have placed her arm into a sling but I would 

recommend getting it out of the sling to move around several times throughout 

the day as she do not want to get a frozen shoulder.  I have also sent a 

prescription for lidocaine patches and a muscle relaxer to [facility] in [place].  Please also continue with anti-inflammatory such as acetaminophen or 

ibuprofen.











IMPRESSION: 








1. No acute fracture, dislocation, or separation. 


2. Chronic calcifications suggesting calcific tendinitis, present previously. 


Forms:  PCP List


Discharge Date/Time: 10/27/23 21:14

## 2023-10-27 NOTE — XRAY REPORT
PROCEDURE:  Shoulder 3 View RT

 

INDICATIONS:  pain

 

TECHNIQUE:  3 views of the shoulder were acquired.  

 

COMPARISON:  Chest x-ray 7/1/2023

 

FINDINGS:  

 

Bones:  No fractures or dislocations.  Enthesopathy along the lateral acromion. No suspicious bony le
sions.  Visualized ribs appear intact.  

 

Soft tissues:  Very small calcifications just adjacent to the greater tuberosity. The visualized lung
s are within normal limits.  

 

 

IMPRESSION:  

 

 

1. No acute fracture, dislocation, or separation.

2. Chronic calcifications suggesting calcific tendinitis, present previously.

 

 

 

Reviewed by: Radha Bond MD on 10/27/2023 8:22 PM PDT

Approved by: Radha Bond MD on 10/27/2023 8:22 PM PDT

 

 

Station ID:  [id number]

## 2024-04-22 ENCOUNTER — HOSPITAL ENCOUNTER (OUTPATIENT)
Dept: HOSPITAL 76 - DI | Age: 39
Discharge: HOME | End: 2024-04-22
Payer: COMMERCIAL

## 2024-04-22 DIAGNOSIS — R92.8: Primary | ICD-10-CM

## 2024-04-22 DIAGNOSIS — R92.332: ICD-10-CM

## 2024-04-23 NOTE — MAMMOGRAPHY REPORT
UNILATERAL LEFT DIGITAL DIAGNOSTIC MAMMOGRAM 3D/2D: 4/22/2024

CLINICAL: Patient returns for a 6 month follow up of the left breast.  

 

Comparison is made to exams dated:  9/18/2023 mammogram and 8/25/2023 mammogram - [facility].  

 

The left breast is heterogeneously dense, which may obscure small masses (category c / 51-75% glandul
ar tissue).  

 

There is a stable 3 mm focal asymmetry in the left breast central to the nipple in the retroareolar r
egion.  This was not seen on the prior ultrasound.  

There also is a stable 4 mm focal asymmetry in the left breast at 5 o'clock middle depth.  This is se
en in additional views.  

No other significant masses or calcifications are seen in the breast.  

 

IMPRESSION: PROBABLY BENIGN

The stable 3 mm focal asymmetry in the left breast central to the nipple in the retroareolar region i
s probably benign.  

The stable 4 mm focal asymmetry in the left breast at 5 o'clock middle depth is probably benign.  

Neither finding was seen by ultrasound previously, indicating probable intramammary lymph nodes

 

A follow-up left mammogram in 6 months is recommended to demonstrate stability.  The patient will be 
due for bilateral mammograms at that same visit. Findings and recommendations were conveyed to the pa
tient at time of exam. 

 

Based on the Tyrer Cuzick model (a risk assessment model) the patient's lifetime risk is 11.9% and he
r 10 year risk is 1.6%. According to the ACR, ACS, and NCCN guidelines, an annual breast MRI exam joel
 with mammogram is recommended if the patient's lifetime risk is 20% or greater.

 

 

This exam was interpreted at Station ID: [id number].  

 

NOTE: For mammograms, a report in lay terms will be sent to the patient. Approximately 15% of breast 
malignancies will not be visualized mammographically. In the management of a palpable breast mass, a 
negative mammogram must not discourage biopsy of a clinically suspicious lesion.

 

Electronically Signed By: Radha chiu/:4/22/2024 12:28:24  

 

 

 

ACR BI-RADS Category 3: Probably benign 3343F

PARENCHYMAL PATTERN: (D) - The breast(s) demonstrate(s) heterogeneously dense fibroglandular reese khalil.

BI-RADS CATEGORY: (3) - 3

Mammogram

[id number]

6 month follow-up

LATERALITY: (L)

## 2024-05-07 ENCOUNTER — HOSPITAL ENCOUNTER (EMERGENCY)
Age: 39
Discharge: HOME | End: 2024-05-07
Payer: OTHER GOVERNMENT

## 2024-05-07 VITALS — OXYGEN SATURATION: 100 % | RESPIRATION RATE: 16 BRPM | HEART RATE: 61 BPM

## 2024-05-07 VITALS
DIASTOLIC BLOOD PRESSURE: 66 MMHG | HEART RATE: 81 BPM | OXYGEN SATURATION: 99 % | SYSTOLIC BLOOD PRESSURE: 116 MMHG | TEMPERATURE: 97.34 F | RESPIRATION RATE: 18 BRPM

## 2024-05-07 VITALS
DIASTOLIC BLOOD PRESSURE: 61 MMHG | HEART RATE: 69 BPM | OXYGEN SATURATION: 100 % | SYSTOLIC BLOOD PRESSURE: 113 MMHG | RESPIRATION RATE: 24 BRPM

## 2024-05-07 VITALS — OXYGEN SATURATION: 99 % | RESPIRATION RATE: 26 BRPM | HEART RATE: 68 BPM

## 2024-05-07 VITALS
DIASTOLIC BLOOD PRESSURE: 65 MMHG | SYSTOLIC BLOOD PRESSURE: 129 MMHG | OXYGEN SATURATION: 100 % | RESPIRATION RATE: 17 BRPM | HEART RATE: 78 BPM

## 2024-05-07 VITALS — RESPIRATION RATE: 25 BRPM | OXYGEN SATURATION: 98 % | HEART RATE: 68 BPM

## 2024-05-07 VITALS — OXYGEN SATURATION: 100 % | RESPIRATION RATE: 16 BRPM | HEART RATE: 71 BPM

## 2024-05-07 VITALS — HEART RATE: 65 BPM | OXYGEN SATURATION: 99 % | RESPIRATION RATE: 16 BRPM

## 2024-05-07 VITALS
HEART RATE: 73 BPM | OXYGEN SATURATION: 100 % | RESPIRATION RATE: 19 BRPM | DIASTOLIC BLOOD PRESSURE: 65 MMHG | SYSTOLIC BLOOD PRESSURE: 123 MMHG

## 2024-05-07 VITALS — RESPIRATION RATE: 20 BRPM | HEART RATE: 60 BPM | OXYGEN SATURATION: 100 %

## 2024-05-07 VITALS — OXYGEN SATURATION: 99 % | DIASTOLIC BLOOD PRESSURE: 66 MMHG | SYSTOLIC BLOOD PRESSURE: 116 MMHG | HEART RATE: 76 BPM

## 2024-05-07 VITALS — OXYGEN SATURATION: 96 % | HEART RATE: 86 BPM

## 2024-05-07 VITALS — OXYGEN SATURATION: 99 % | HEART RATE: 64 BPM | RESPIRATION RATE: 21 BRPM

## 2024-05-07 VITALS — HEART RATE: 58 BPM | RESPIRATION RATE: 20 BRPM | OXYGEN SATURATION: 100 %

## 2024-05-07 VITALS — RESPIRATION RATE: 19 BRPM | OXYGEN SATURATION: 100 % | HEART RATE: 59 BPM

## 2024-05-07 VITALS — HEART RATE: 86 BPM | RESPIRATION RATE: 34 BRPM

## 2024-05-07 VITALS — BODY MASS INDEX: 32.8 KG/M2

## 2024-05-07 VITALS — OXYGEN SATURATION: 100 % | RESPIRATION RATE: 20 BRPM | HEART RATE: 67 BPM

## 2024-05-07 VITALS — OXYGEN SATURATION: 100 % | HEART RATE: 62 BPM | RESPIRATION RATE: 22 BRPM

## 2024-05-07 DIAGNOSIS — R10.2: ICD-10-CM

## 2024-05-07 DIAGNOSIS — R10.11: Primary | ICD-10-CM

## 2024-05-07 LAB
ADD MANUAL DIFF / SLIDE REVIEW: NO
ALBUMIN SERPL-MCNC: 4.5 G/DL (ref 3.5–5)
ALBUMIN/GLOB SERPL: 1.4 {RATIO} (ref 1–2.8)
ALP SERPL-CCNC: 69 U/L (ref 38–126)
ALT SERPL-CCNC: 47 IU/L (ref ?–35)
BUN SERPL-MCNC: 10 MG/DL (ref 7–17)
CALCIUM SERPL-MCNC: 9 MG/DL (ref 8.4–10.2)
CHLORIDE SERPL-SCNC: 108 MMOL/L (ref 98–107)
CO2 SERPL-SCNC: 26 MMOL/L (ref 22–32)
ESTIMATED GLOMERULAR FILT RATE: > 60 ML/MIN (ref 60–?)
GLOBULIN SER CALC-MCNC: 3.3 G/DL (ref 1.7–4.1)
GLUCOSE SERPL-MCNC: 96 MG/DL (ref 70–100)
HEMATOCRIT: 44.1 % (ref 36–46)
HEMOGLOBIN: 15.3 G/DL (ref 12–16)
HEMOLYSIS: < 15 (ref 0–50)
LIPASE SERPL-CCNC: 66 U/L (ref 23–300)
LYMPHOCYTES # SPEC AUTO: 2000 /UL (ref 1100–4500)
MCV RBC: 91.6 FL (ref 80–100)
MEAN CORPUSCULAR HEMOGLOBIN: 31.7 PG (ref 26–34)
MEAN CORPUSCULAR HGB CONC: 34.6 % (ref 30–36)
PLATELET COUNT: 216 X10^3/UL (ref 150–400)
POTASSIUM SERPL-SCNC: 4.7 MMOL/L (ref 3.4–5.1)
PROT SERPL-MCNC: 7.8 G/DL (ref 6.3–8.2)
SODIUM SERPL-SCNC: 139 MMOL/L (ref 137–145)

## 2024-05-07 PROCEDURE — 74177 CT ABD & PELVIS W/CONTRAST: CPT

## 2024-05-07 PROCEDURE — 83690 ASSAY OF LIPASE: CPT

## 2024-05-07 PROCEDURE — 36415 COLL VENOUS BLD VENIPUNCTURE: CPT

## 2024-05-07 PROCEDURE — 76830 TRANSVAGINAL US NON-OB: CPT

## 2024-05-07 PROCEDURE — C9113 INJ PANTOPRAZOLE SODIUM, VIA: HCPCS

## 2024-05-07 PROCEDURE — 81025 URINE PREGNANCY TEST: CPT

## 2024-05-07 PROCEDURE — 96361 HYDRATE IV INFUSION ADD-ON: CPT

## 2024-05-07 PROCEDURE — 80053 COMPREHEN METABOLIC PANEL: CPT

## 2024-05-07 PROCEDURE — 96374 THER/PROPH/DIAG INJ IV PUSH: CPT

## 2024-05-07 PROCEDURE — 99284 EMERGENCY DEPT VISIT MOD MDM: CPT

## 2024-05-07 PROCEDURE — 81003 URINALYSIS AUTO W/O SCOPE: CPT

## 2024-05-07 PROCEDURE — 93005 ELECTROCARDIOGRAM TRACING: CPT

## 2024-05-07 PROCEDURE — 99285 EMERGENCY DEPT VISIT HI MDM: CPT

## 2024-05-07 PROCEDURE — 96375 TX/PRO/DX INJ NEW DRUG ADDON: CPT

## 2024-05-07 PROCEDURE — 76856 US EXAM PELVIC COMPLETE: CPT

## 2024-05-07 PROCEDURE — 85025 COMPLETE CBC W/AUTO DIFF WBC: CPT

## 2024-05-09 ENCOUNTER — HOSPITAL ENCOUNTER (EMERGENCY)
Age: 39
LOS: 1 days | Discharge: HOME | End: 2024-05-10
Payer: OTHER GOVERNMENT

## 2024-05-09 VITALS — BODY MASS INDEX: 32.8 KG/M2

## 2024-05-09 VITALS
SYSTOLIC BLOOD PRESSURE: 109 MMHG | RESPIRATION RATE: 24 BRPM | DIASTOLIC BLOOD PRESSURE: 59 MMHG | OXYGEN SATURATION: 97 % | HEART RATE: 56 BPM

## 2024-05-09 VITALS
OXYGEN SATURATION: 99 % | RESPIRATION RATE: 16 BRPM | DIASTOLIC BLOOD PRESSURE: 78 MMHG | TEMPERATURE: 97.88 F | SYSTOLIC BLOOD PRESSURE: 123 MMHG | HEART RATE: 69 BPM

## 2024-05-09 DIAGNOSIS — K76.0: ICD-10-CM

## 2024-05-09 DIAGNOSIS — R10.10: Primary | ICD-10-CM

## 2024-05-09 LAB
ADD MANUAL DIFF / SLIDE REVIEW: NO
ALBUMIN SERPL-MCNC: 4.5 G/DL (ref 3.5–5)
ALBUMIN/GLOB SERPL: 1.4 {RATIO} (ref 1–2.8)
ALP SERPL-CCNC: 64 U/L (ref 38–126)
ALT SERPL-CCNC: 40 IU/L (ref ?–35)
BUN SERPL-MCNC: 10 MG/DL (ref 7–17)
CALCIUM SERPL-MCNC: 9.4 MG/DL (ref 8.4–10.2)
CHLORIDE SERPL-SCNC: 102 MMOL/L (ref 98–107)
CO2 SERPL-SCNC: 28 MMOL/L (ref 22–32)
ESTIMATED GLOMERULAR FILT RATE: > 60 ML/MIN (ref 60–?)
GLOBULIN SER CALC-MCNC: 3.2 G/DL (ref 1.7–4.1)
GLUCOSE SERPL-MCNC: 141 MG/DL (ref 70–100)
HEMATOCRIT: 43.1 % (ref 36–46)
HEMOGLOBIN: 14.8 G/DL (ref 12–16)
HEMOLYSIS: < 15 (ref 0–50)
LIPASE SERPL-CCNC: 29 U/L (ref 23–300)
LYMPHOCYTES # SPEC AUTO: 1800 /UL (ref 1100–4500)
MCV RBC: 91.8 FL (ref 80–100)
MEAN CORPUSCULAR HEMOGLOBIN: 31.4 PG (ref 26–34)
MEAN CORPUSCULAR HGB CONC: 34.2 % (ref 30–36)
PLATELET COUNT: 219 X10^3/UL (ref 150–400)
POTASSIUM SERPL-SCNC: 3.9 MMOL/L (ref 3.4–5.1)
PROT SERPL-MCNC: 7.7 G/DL (ref 6.3–8.2)
SODIUM SERPL-SCNC: 136 MMOL/L (ref 137–145)

## 2024-05-09 PROCEDURE — 96374 THER/PROPH/DIAG INJ IV PUSH: CPT

## 2024-05-09 PROCEDURE — 99284 EMERGENCY DEPT VISIT MOD MDM: CPT

## 2024-05-09 PROCEDURE — 83690 ASSAY OF LIPASE: CPT

## 2024-05-09 PROCEDURE — 76705 ECHO EXAM OF ABDOMEN: CPT

## 2024-05-09 PROCEDURE — 80053 COMPREHEN METABOLIC PANEL: CPT

## 2024-05-09 PROCEDURE — 81003 URINALYSIS AUTO W/O SCOPE: CPT

## 2024-05-09 PROCEDURE — 99291 CRITICAL CARE FIRST HOUR: CPT

## 2024-05-09 PROCEDURE — 36415 COLL VENOUS BLD VENIPUNCTURE: CPT

## 2024-05-09 PROCEDURE — 81025 URINE PREGNANCY TEST: CPT

## 2024-05-09 PROCEDURE — 85025 COMPLETE CBC W/AUTO DIFF WBC: CPT

## 2024-06-19 ENCOUNTER — HOSPITAL ENCOUNTER (OUTPATIENT)
Dept: HOSPITAL 73 - NUCM | Age: 39
End: 2024-06-19
Payer: OTHER GOVERNMENT

## 2024-06-19 DIAGNOSIS — R10.9: Primary | ICD-10-CM

## 2024-06-19 PROCEDURE — A9537 TC99M MEBROFENIN: HCPCS

## 2024-06-19 PROCEDURE — 78227 HEPATOBIL SYST IMAGE W/DRUG: CPT

## 2024-07-03 ENCOUNTER — HOSPITAL ENCOUNTER (OUTPATIENT)
Age: 39
Discharge: HOME | End: 2024-07-03
Payer: OTHER GOVERNMENT

## 2024-07-03 VITALS
RESPIRATION RATE: 14 BRPM | OXYGEN SATURATION: 98 % | SYSTOLIC BLOOD PRESSURE: 123 MMHG | HEART RATE: 67 BPM | DIASTOLIC BLOOD PRESSURE: 71 MMHG

## 2024-07-03 VITALS
OXYGEN SATURATION: 97 % | HEART RATE: 71 BPM | SYSTOLIC BLOOD PRESSURE: 120 MMHG | RESPIRATION RATE: 12 BRPM | DIASTOLIC BLOOD PRESSURE: 73 MMHG

## 2024-07-03 VITALS
OXYGEN SATURATION: 97 % | DIASTOLIC BLOOD PRESSURE: 76 MMHG | RESPIRATION RATE: 16 BRPM | SYSTOLIC BLOOD PRESSURE: 123 MMHG | HEART RATE: 71 BPM

## 2024-07-03 VITALS — BODY MASS INDEX: 32.4 KG/M2 | BODY MASS INDEX: 32.8 KG/M2

## 2024-07-03 VITALS
SYSTOLIC BLOOD PRESSURE: 126 MMHG | RESPIRATION RATE: 27 BRPM | DIASTOLIC BLOOD PRESSURE: 74 MMHG | HEART RATE: 66 BPM | OXYGEN SATURATION: 93 %

## 2024-07-03 VITALS
HEART RATE: 67 BPM | RESPIRATION RATE: 19 BRPM | OXYGEN SATURATION: 98 % | SYSTOLIC BLOOD PRESSURE: 139 MMHG | DIASTOLIC BLOOD PRESSURE: 87 MMHG

## 2024-07-03 VITALS
RESPIRATION RATE: 16 BRPM | OXYGEN SATURATION: 98 % | DIASTOLIC BLOOD PRESSURE: 87 MMHG | HEART RATE: 70 BPM | SYSTOLIC BLOOD PRESSURE: 138 MMHG

## 2024-07-03 VITALS
RESPIRATION RATE: 11 BRPM | SYSTOLIC BLOOD PRESSURE: 134 MMHG | DIASTOLIC BLOOD PRESSURE: 84 MMHG | HEART RATE: 63 BPM | TEMPERATURE: 99 F | OXYGEN SATURATION: 96 %

## 2024-07-03 VITALS
OXYGEN SATURATION: 96 % | HEART RATE: 71 BPM | SYSTOLIC BLOOD PRESSURE: 116 MMHG | RESPIRATION RATE: 18 BRPM | DIASTOLIC BLOOD PRESSURE: 70 MMHG

## 2024-07-03 VITALS
SYSTOLIC BLOOD PRESSURE: 116 MMHG | RESPIRATION RATE: 16 BRPM | DIASTOLIC BLOOD PRESSURE: 73 MMHG | OXYGEN SATURATION: 97 % | HEART RATE: 69 BPM

## 2024-07-03 VITALS
OXYGEN SATURATION: 97 % | DIASTOLIC BLOOD PRESSURE: 64 MMHG | RESPIRATION RATE: 17 BRPM | HEART RATE: 69 BPM | SYSTOLIC BLOOD PRESSURE: 117 MMHG

## 2024-07-03 VITALS
SYSTOLIC BLOOD PRESSURE: 126 MMHG | RESPIRATION RATE: 20 BRPM | DIASTOLIC BLOOD PRESSURE: 87 MMHG | HEART RATE: 67 BPM | OXYGEN SATURATION: 97 %

## 2024-07-03 VITALS
RESPIRATION RATE: 17 BRPM | SYSTOLIC BLOOD PRESSURE: 117 MMHG | HEART RATE: 71 BPM | DIASTOLIC BLOOD PRESSURE: 74 MMHG | OXYGEN SATURATION: 96 %

## 2024-07-03 VITALS
OXYGEN SATURATION: 97 % | TEMPERATURE: 97.34 F | RESPIRATION RATE: 16 BRPM | HEART RATE: 68 BPM | SYSTOLIC BLOOD PRESSURE: 126 MMHG | DIASTOLIC BLOOD PRESSURE: 77 MMHG

## 2024-07-03 VITALS
RESPIRATION RATE: 20 BRPM | SYSTOLIC BLOOD PRESSURE: 130 MMHG | HEART RATE: 70 BPM | OXYGEN SATURATION: 93 % | DIASTOLIC BLOOD PRESSURE: 73 MMHG | TEMPERATURE: 98.4 F

## 2024-07-03 VITALS
OXYGEN SATURATION: 97 % | HEART RATE: 70 BPM | DIASTOLIC BLOOD PRESSURE: 76 MMHG | SYSTOLIC BLOOD PRESSURE: 121 MMHG | TEMPERATURE: 97.4 F | RESPIRATION RATE: 17 BRPM

## 2024-07-03 DIAGNOSIS — K81.1: Primary | ICD-10-CM

## 2024-07-03 LAB
BUN SERPL-MCNC: 11 MG/DL (ref 7–17)
CALCIUM SERPL-MCNC: 8.6 MG/DL (ref 8.4–10.2)
CHLORIDE SERPL-SCNC: 110 MMOL/L (ref 98–107)
CO2 SERPL-SCNC: 21 MMOL/L (ref 22–32)
ESTIMATED GLOMERULAR FILT RATE: > 60 ML/MIN (ref 60–?)
GLUCOSE SERPL-MCNC: 126 MG/DL (ref 70–100)
HEMOLYSIS: 32 (ref 0–50)
POTASSIUM SERPL-SCNC: 4.4 MMOL/L (ref 3.4–5.1)
SODIUM SERPL-SCNC: 138 MMOL/L (ref 137–145)

## 2024-07-03 PROCEDURE — 36415 COLL VENOUS BLD VENIPUNCTURE: CPT

## 2024-07-03 PROCEDURE — 47562 LAPAROSCOPIC CHOLECYSTECTOMY: CPT

## 2024-07-03 PROCEDURE — 80048 BASIC METABOLIC PNL TOTAL CA: CPT

## 2024-07-03 PROCEDURE — 0FT44ZZ RESECTION OF GALLBLADDER, PERCUTANEOUS ENDOSCOPIC APPROACH: ICD-10-PCS | Performed by: SURGERY

## 2024-07-03 PROCEDURE — 81025 URINE PREGNANCY TEST: CPT

## 2024-08-01 ENCOUNTER — HOSPITAL ENCOUNTER (OUTPATIENT)
Dept: HOSPITAL 73 - PHYS | Age: 39
End: 2024-08-01
Payer: OTHER GOVERNMENT

## 2024-08-01 DIAGNOSIS — R20.0: Primary | ICD-10-CM

## 2024-08-01 PROCEDURE — 95885 MUSC TST DONE W/NERV TST LIM: CPT

## 2024-08-01 PROCEDURE — 95912 NRV CNDJ TEST 11-12 STUDIES: CPT

## 2024-08-01 PROCEDURE — 95886 MUSC TEST DONE W/N TEST COMP: CPT

## 2024-08-31 ENCOUNTER — HOSPITAL ENCOUNTER (OUTPATIENT)
Dept: HOSPITAL 76 - DI | Age: 39
Discharge: HOME | End: 2024-08-31
Attending: FAMILY MEDICINE
Payer: COMMERCIAL

## 2024-08-31 DIAGNOSIS — M75.111: Primary | ICD-10-CM

## 2024-08-31 DIAGNOSIS — M75.81: ICD-10-CM

## 2024-08-31 DIAGNOSIS — M67.921: ICD-10-CM

## 2024-09-03 NOTE — MRI REPORT
PROCEDURE:  Shoulder RT WO

 

INDICATIONS:  ROTATOR CUFF SYNDROME RIGHT

 

TECHNIQUE:  

Noncontrast oblique coronal T2 fast spin echo with fat saturation, oblique sagittal T1 spin echo and 
T2 fast spin echo with fat saturation, axial T1 spin echo and T2 fast spin echo with fat saturation t
hrough the shoulder.  

 

COMPARISON:  Right shoulder radiographs 10/27/2023.

 

FINDINGS:  

Image quality:  Excellent.  

 

Rotator cuff:  Low-grade partial bursal sided tearing of the supraspinatus tendon at the anterior ins
ertion. Moderate supraspinatus and infraspinatus tendinosis. 4 mm hypointense structure adjacent to t
he infraspinatus insertion is most likely a calcification as seen on prior radiographs related to rach
cific tendinopathy. Teres minor tendon is intact. The subscapularis tendon is also intact. The rotato
r cuff musculature is normal in bulk.

 

Bones and bursae:  Mild osseous edema is seen at the distal clavicle. No fracture line is seen. There
 are are moderate degenerative changes of the acromioclavicular joint with small marginal osteophytes
. The remaining osseous structures are intact. No focal glenohumeral cartilage defect. A small amount
 of fluid is seen in the subacromial/subdeltoid bursa. No significant glenohumeral effusion.

 

Capsule and soft tissues:  No acute displaced labral tear. Proximal biceps long head tendon demonstra
maria r mild tendinosis. There is partial effacement of the fat signal in the rotator interval. The anter
ior band inferior glenohumeral ligament is mildly thickened.

 

IMPRESSION:  

1.Low-grade partial bursal sided tearing of the supraspinatus tendon at the anterior insertion superi
mposed on moderate tendinosis.

 

2.Moderate infraspinatus tendinosis and calcific tendinopathy.

 

3.Mild tendinosis of the proximal biceps long head tendon.

 

4.Moderate acromioclavicular joint osteoarthrosis. Subchondral edema of the distal clavicle is favore
d to be secondary to adjacent degenerative changes although an osseous contusion or chronic repetitiv
e use injury are not excluded. Acromioclavicular joint is normally aligned.

 

5.Small subacromial/subdeltoid bursal effusion or mild bursitis.

 

6.Partial effacement of the rotator interval fat and mild thickening of the inferior glenohumeral lig
ament are nonspecific, but can be seen in the setting of the clinical syndrome of adhesive capsulitis
.

 

 

Reviewed by: Selvin Love MD on 9/3/2024 11:37 AM PDT

Approved by: Selvin Love MD on 9/3/2024 11:37 AM PDT

 

 

Station ID:  [id number]